# Patient Record
Sex: MALE | Race: WHITE | NOT HISPANIC OR LATINO | URBAN - METROPOLITAN AREA
[De-identification: names, ages, dates, MRNs, and addresses within clinical notes are randomized per-mention and may not be internally consistent; named-entity substitution may affect disease eponyms.]

---

## 2022-11-19 ENCOUNTER — INPATIENT (INPATIENT)
Facility: HOSPITAL | Age: 47
LOS: 9 days | Discharge: HOME | DRG: 231 | End: 2022-11-29
Attending: THORACIC SURGERY (CARDIOTHORACIC VASCULAR SURGERY) | Admitting: THORACIC SURGERY (CARDIOTHORACIC VASCULAR SURGERY)
Payer: MEDICAID

## 2022-11-19 VITALS
HEART RATE: 86 BPM | WEIGHT: 197.98 LBS | DIASTOLIC BLOOD PRESSURE: 103 MMHG | TEMPERATURE: 98 F | RESPIRATION RATE: 18 BRPM | HEIGHT: 67 IN | SYSTOLIC BLOOD PRESSURE: 221 MMHG | OXYGEN SATURATION: 100 %

## 2022-11-19 LAB
ALBUMIN SERPL ELPH-MCNC: 3.7 G/DL — SIGNIFICANT CHANGE UP (ref 3.3–5.2)
ALP SERPL-CCNC: 100 U/L — SIGNIFICANT CHANGE UP (ref 40–120)
ALT FLD-CCNC: 22 U/L — SIGNIFICANT CHANGE UP
ANION GAP SERPL CALC-SCNC: 10 MMOL/L — SIGNIFICANT CHANGE UP (ref 5–17)
APTT BLD: 30.3 SEC — SIGNIFICANT CHANGE UP (ref 27.5–35.5)
AST SERPL-CCNC: 17 U/L — SIGNIFICANT CHANGE UP
BASOPHILS # BLD AUTO: 0.09 K/UL — SIGNIFICANT CHANGE UP (ref 0–0.2)
BASOPHILS NFR BLD AUTO: 0.6 % — SIGNIFICANT CHANGE UP (ref 0–2)
BILIRUB SERPL-MCNC: 0.4 MG/DL — SIGNIFICANT CHANGE UP (ref 0.4–2)
BUN SERPL-MCNC: 18.8 MG/DL — SIGNIFICANT CHANGE UP (ref 8–20)
CALCIUM SERPL-MCNC: 8.6 MG/DL — SIGNIFICANT CHANGE UP (ref 8.4–10.5)
CHLORIDE SERPL-SCNC: 104 MMOL/L — SIGNIFICANT CHANGE UP (ref 96–108)
CO2 SERPL-SCNC: 23 MMOL/L — SIGNIFICANT CHANGE UP (ref 22–29)
CREAT SERPL-MCNC: 0.87 MG/DL — SIGNIFICANT CHANGE UP (ref 0.5–1.3)
EGFR: 108 ML/MIN/1.73M2 — SIGNIFICANT CHANGE UP
EOSINOPHIL # BLD AUTO: 0.22 K/UL — SIGNIFICANT CHANGE UP (ref 0–0.5)
EOSINOPHIL NFR BLD AUTO: 1.5 % — SIGNIFICANT CHANGE UP (ref 0–6)
GLUCOSE SERPL-MCNC: 296 MG/DL — HIGH (ref 70–99)
HCT VFR BLD CALC: 42.3 % — SIGNIFICANT CHANGE UP (ref 39–50)
HGB BLD-MCNC: 15.1 G/DL — SIGNIFICANT CHANGE UP (ref 13–17)
IMM GRANULOCYTES NFR BLD AUTO: 1 % — HIGH (ref 0–0.9)
INR BLD: 1.06 RATIO — SIGNIFICANT CHANGE UP (ref 0.88–1.16)
LIDOCAIN IGE QN: 34 U/L — SIGNIFICANT CHANGE UP (ref 22–51)
LYMPHOCYTES # BLD AUTO: 29.2 % — SIGNIFICANT CHANGE UP (ref 13–44)
LYMPHOCYTES # BLD AUTO: 4.36 K/UL — HIGH (ref 1–3.3)
MAGNESIUM SERPL-MCNC: 1.8 MG/DL — SIGNIFICANT CHANGE UP (ref 1.6–2.6)
MCHC RBC-ENTMCNC: 29.5 PG — SIGNIFICANT CHANGE UP (ref 27–34)
MCHC RBC-ENTMCNC: 35.7 GM/DL — SIGNIFICANT CHANGE UP (ref 32–36)
MCV RBC AUTO: 82.8 FL — SIGNIFICANT CHANGE UP (ref 80–100)
MONOCYTES # BLD AUTO: 0.94 K/UL — HIGH (ref 0–0.9)
MONOCYTES NFR BLD AUTO: 6.3 % — SIGNIFICANT CHANGE UP (ref 2–14)
NEUTROPHILS # BLD AUTO: 9.18 K/UL — HIGH (ref 1.8–7.4)
NEUTROPHILS NFR BLD AUTO: 61.4 % — SIGNIFICANT CHANGE UP (ref 43–77)
NT-PROBNP SERPL-SCNC: 212 PG/ML — SIGNIFICANT CHANGE UP (ref 0–300)
PLATELET # BLD AUTO: 218 K/UL — SIGNIFICANT CHANGE UP (ref 150–400)
POTASSIUM SERPL-MCNC: 4 MMOL/L — SIGNIFICANT CHANGE UP (ref 3.5–5.3)
POTASSIUM SERPL-SCNC: 4 MMOL/L — SIGNIFICANT CHANGE UP (ref 3.5–5.3)
PROT SERPL-MCNC: 6.7 G/DL — SIGNIFICANT CHANGE UP (ref 6.6–8.7)
PROTHROM AB SERPL-ACNC: 12.3 SEC — SIGNIFICANT CHANGE UP (ref 10.5–13.4)
RBC # BLD: 5.11 M/UL — SIGNIFICANT CHANGE UP (ref 4.2–5.8)
RBC # FLD: 12.3 % — SIGNIFICANT CHANGE UP (ref 10.3–14.5)
SODIUM SERPL-SCNC: 137 MMOL/L — SIGNIFICANT CHANGE UP (ref 135–145)
TROPONIN T SERPL-MCNC: <0.01 NG/ML — SIGNIFICANT CHANGE UP (ref 0–0.06)
WBC # BLD: 14.94 K/UL — HIGH (ref 3.8–10.5)
WBC # FLD AUTO: 14.94 K/UL — HIGH (ref 3.8–10.5)

## 2022-11-19 PROCEDURE — 99285 EMERGENCY DEPT VISIT HI MDM: CPT

## 2022-11-19 PROCEDURE — 71045 X-RAY EXAM CHEST 1 VIEW: CPT | Mod: 26

## 2022-11-19 RX ORDER — HYDRALAZINE HCL 50 MG
10 TABLET ORAL ONCE
Refills: 0 | Status: COMPLETED | OUTPATIENT
Start: 2022-11-19 | End: 2022-11-19

## 2022-11-19 RX ORDER — ASPIRIN/CALCIUM CARB/MAGNESIUM 324 MG
324 TABLET ORAL ONCE
Refills: 0 | Status: COMPLETED | OUTPATIENT
Start: 2022-11-19 | End: 2022-11-19

## 2022-11-19 RX ORDER — LISINOPRIL 2.5 MG/1
20 TABLET ORAL ONCE
Refills: 0 | Status: COMPLETED | OUTPATIENT
Start: 2022-11-19 | End: 2022-11-19

## 2022-11-19 RX ORDER — METOPROLOL TARTRATE 50 MG
100 TABLET ORAL ONCE
Refills: 0 | Status: COMPLETED | OUTPATIENT
Start: 2022-11-19 | End: 2022-11-19

## 2022-11-19 RX ORDER — LABETALOL HCL 100 MG
10 TABLET ORAL ONCE
Refills: 0 | Status: COMPLETED | OUTPATIENT
Start: 2022-11-19 | End: 2022-11-19

## 2022-11-19 RX ADMIN — Medication 324 MILLIGRAM(S): at 20:15

## 2022-11-19 RX ADMIN — Medication 10 MILLIGRAM(S): at 20:15

## 2022-11-19 RX ADMIN — Medication 10 MILLIGRAM(S): at 22:56

## 2022-11-19 RX ADMIN — LISINOPRIL 20 MILLIGRAM(S): 2.5 TABLET ORAL at 22:55

## 2022-11-19 RX ADMIN — Medication 100 MILLIGRAM(S): at 22:55

## 2022-11-19 NOTE — ED PROVIDER NOTE - PROGRESS NOTE DETAILS
Dakota: Pt with improved BP, remains asymptomatic. Consult placed to [facility]. Plan for observation.

## 2022-11-19 NOTE — ED PROVIDER NOTE - GENITOURINARY NEGATIVE STATEMENT, MLM
Alert and oriented to person, place, time/situation. normal mood and affect. no apparent risk to self or others. no dysuria, no frequency, and no hematuria.

## 2022-11-19 NOTE — ED PROVIDER NOTE - ENMT, MLM
Dr Lemus
Airway patent, Nasal mucosa clear. Mouth with normal mucosa. Throat has no vesicles, no oropharyngeal exudates and uvula is midline.

## 2022-11-19 NOTE — ED PROVIDER NOTE - OBJECTIVE STATEMENT
A 46 yo M with pmh of HTN, DM, presents c/o chest pain while walking, started at 3pm, located in the middle chest, no radiation, lasted for 3 min. Denies sob, HA, abd pain n/v/d. At 5 pm, Pt had CP again with tingling sensation in R arm, lasted for 10 min. At 7:15 pm, had again and then came to the ED. States he is currently hypertensive but asymptomatic. Reports that he forgot taking medication today. Denies taking blood thinner.

## 2022-11-19 NOTE — ED ADULT NURSE NOTE - OBJECTIVE STATEMENT
AOx4 c/o chest pain, non radiating.  pt states that he has PMH hypertension, home meds lisinopril, metoprolol.  hypertensive in ED, medications ordered.  pt placed on CM, NSR.  denies HA, SOB, sick contacts, NVD.  Afebrile.

## 2022-11-19 NOTE — ED PROVIDER NOTE - CLINICAL SUMMARY MEDICAL DECISION MAKING FREE TEXT BOX
A 48 yo M with pmh of HTN, DM, presents c/o chest pain while walking, started at 3pm, located in the middle chest, no radiation, lasted for 3 min. Denies sob, HA, abd pain n/v/d. At 5 pm, Pt had CP again with tingling sensation in R arm, lasted for 10 min. At 7:15 pm, had again and then came to the ED. States he is currently hypertensive but asymptomatic. Reports that he forgot taking medication today. Denies taking blood thinner.  Check cbc, cmp, coag, trop x2, ekg, cxr, swab. Cards consult. IV Labetalol and aspirin 325 given.

## 2022-11-19 NOTE — ED ADULT TRIAGE NOTE - CHIEF COMPLAINT QUOTE
Ambulatory reporting three episodes of burning chest pain today. The first happened after eating his first meal today @ approx 1500 with two subsequent episodes after that. Denies N/V/D, sick contacts. EKG completed prior to triage. Patient takes 2 anti-hypertensives prescribed to him in [place] but does not follow up with cardiologist here. Hypertensive in triage.

## 2022-11-19 NOTE — ED PROVIDER NOTE - ATTENDING CONTRIBUTION TO CARE
Dakota SOTO: I performed a face to face evaluation of this patient and performed a full history and physical examination on the patient.  I agree with the resident's history, physical examination, and plan of the patient unless otherwise noted. My brief assessment is as follows:    47y M w/ hx HTN, DM, presents with chest pain. Pt reports that he was at work today at 3 PM when symptoms began. 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 Complains of burning/pressure discomfort to the substernal area, sometimes radiating to his hands b/l. Symptoms lasted a few minutes and then subsided. No diaphoresis, nausea, vomiting, SOB, palpitations. States that symptoms recurred two other times prior to arrival. Currently asymptomatic. Denies prior hx of similar episodes. Pt with elevated BP on arrival; states that he forgot to take his medications today. Has never seen a cardiologist. No known family hx of CAD. Smokes 1 PPD. On exam, pt well appearing and in no distress, heart RRR, no murmur. Lungs CTAB, abdomen soft/NT/ND. Nonspecific ST-T changes on EKG. Will check CXR, labs. Give ASA, labetalol. Consult cardiology.

## 2022-11-19 NOTE — ED ADULT NURSE NOTE - NEURO BEHAVIOR
Spoke to mom to notify her that labs are in and she can get them done at any time but pt must be fasting. Mom verbalized understanding.    calm

## 2022-11-20 DIAGNOSIS — R07.9 CHEST PAIN, UNSPECIFIED: ICD-10-CM

## 2022-11-20 LAB
A1C WITH ESTIMATED AVERAGE GLUCOSE RESULT: 11.8 % — HIGH (ref 4–5.6)
ESTIMATED AVERAGE GLUCOSE: 292 MG/DL — HIGH (ref 68–114)
GLUCOSE BLDC GLUCOMTR-MCNC: 213 MG/DL — HIGH (ref 70–99)
GLUCOSE BLDC GLUCOMTR-MCNC: 217 MG/DL — HIGH (ref 70–99)
GLUCOSE BLDC GLUCOMTR-MCNC: 246 MG/DL — HIGH (ref 70–99)
SARS-COV-2 RNA SPEC QL NAA+PROBE: SIGNIFICANT CHANGE UP
TROPONIN T SERPL-MCNC: <0.01 NG/ML — SIGNIFICANT CHANGE UP (ref 0–0.06)

## 2022-11-20 PROCEDURE — 99218: CPT

## 2022-11-20 RX ORDER — INSULIN LISPRO 100/ML
VIAL (ML) SUBCUTANEOUS
Refills: 0 | Status: DISCONTINUED | OUTPATIENT
Start: 2022-11-20 | End: 2022-11-23

## 2022-11-20 RX ORDER — SODIUM CHLORIDE 9 MG/ML
1000 INJECTION, SOLUTION INTRAVENOUS
Refills: 0 | Status: DISCONTINUED | OUTPATIENT
Start: 2022-11-20 | End: 2022-11-23

## 2022-11-20 RX ORDER — DEXTROSE 50 % IN WATER 50 %
25 SYRINGE (ML) INTRAVENOUS ONCE
Refills: 0 | Status: DISCONTINUED | OUTPATIENT
Start: 2022-11-20 | End: 2022-11-23

## 2022-11-20 RX ORDER — DEXTROSE 50 % IN WATER 50 %
12.5 SYRINGE (ML) INTRAVENOUS ONCE
Refills: 0 | Status: DISCONTINUED | OUTPATIENT
Start: 2022-11-20 | End: 2022-11-23

## 2022-11-20 RX ORDER — GLUCAGON INJECTION, SOLUTION 0.5 MG/.1ML
1 INJECTION, SOLUTION SUBCUTANEOUS ONCE
Refills: 0 | Status: DISCONTINUED | OUTPATIENT
Start: 2022-11-20 | End: 2022-11-23

## 2022-11-20 RX ORDER — LISINOPRIL 2.5 MG/1
20 TABLET ORAL
Refills: 0 | Status: DISCONTINUED | OUTPATIENT
Start: 2022-11-20 | End: 2022-11-23

## 2022-11-20 RX ORDER — DEXTROSE 50 % IN WATER 50 %
15 SYRINGE (ML) INTRAVENOUS ONCE
Refills: 0 | Status: DISCONTINUED | OUTPATIENT
Start: 2022-11-20 | End: 2022-11-23

## 2022-11-20 RX ORDER — METOPROLOL TARTRATE 50 MG
100 TABLET ORAL
Refills: 0 | Status: DISCONTINUED | OUTPATIENT
Start: 2022-11-20 | End: 2022-11-23

## 2022-11-20 RX ADMIN — Medication 2: at 11:52

## 2022-11-20 RX ADMIN — LISINOPRIL 20 MILLIGRAM(S): 2.5 TABLET ORAL at 21:29

## 2022-11-20 RX ADMIN — Medication 2: at 17:02

## 2022-11-20 RX ADMIN — Medication 2: at 08:04

## 2022-11-20 RX ADMIN — Medication 100 MILLIGRAM(S): at 21:29

## 2022-11-20 NOTE — ED CDU PROVIDER INITIAL DAY NOTE - MEDICAL DECISION MAKING DETAILS
Chest pain:   -To be seen by [facility] cardiology   -trop neg x 1. Repeat pending.   -Nonspecific ST-T changes on EKG  -Smokes 1ppd   -Given asa in ED   -CXR    HTN/DM:   -ISS  -home meds

## 2022-11-20 NOTE — CONSULT NOTE ADULT - ASSESSMENT
This is 46 y/o male with hx of HTN, DM2, current smoker who presents with chest pain. Pain is midsternal, nonradiating but associated with some b/l hand discomfort. 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 Pt received  mg in ED. He was found hypertensive 221/103 and was treated with Labetalol and Hydralazine IV. Troponin negative x1, proBNP normal. EKG shows SR with nonspecific T-wave abnormality. Pt does not have a cardiologist and denies any prior cardiac issues.

## 2022-11-20 NOTE — CONSULT NOTE ADULT - SUBJECTIVE AND OBJECTIVE BOX
[facility]                                              CARDIOLOGY AT [place] [address]                                             Telephone: [phone]. Fax:[phone]                                                       CARDIOLOGY CONSULTATION NOTE                                                                                             History obtained by: Patient and medical record  Community Cardiologist: n/a   obtained: Yes [  ] No [ x ]  Reason for Consultation: chest pain  Available out pt records reviewed: Yes [  ] No [ x ]    Chief complaint:  "I had pain in my chest"    HPI:  This is 46 y/o male with hx of HTN, DM2, current smoker who presents with chest pain. Pain is midsternal, nonradiating but associated with some b/l hand discomfort. 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 Pt received  mg in ED. He was found hypertensive 221/103 and was treated with Labetalol and Hydralazine IV. Troponin negative x1, proBNP normal. EKG shows SR with nonspecific T-wave abnormality. Pt does not have a cardiologist and denies any prior cardiac issues.    CARDIAC TESTING   ECHO: n/a    STRESS: n/a    CATH: n/a    ELECTROPHYSIOLOGY: n/a    PAST MEDICAL HISTORY  HTN (hypertension)    DM (diabetes mellitus)  smoker        PAST SURGICAL HISTORY      SOCIAL HISTORY:  lives with family, installs doors  CIGARETTES:   smokes 1 ppd for 30 years  ALCOHOL: denies  DRUGS: denies    FAMILY HISTORY:    Family History of Cardiovascular Disease:  Yes [  ] No [  ]  Coronary Artery Disease in first degree relative: Yes [  ] No [  ]  Sudden Cardiac Death in First degree relative: Yes [  ] No [  ]    HOME MEDICATIONS:  lisinopril 20 mg oral tablet: 1 tab(s) orally 2 times a day (20 Nov 2022 00:32)  metFORMIN 1000 mg oral tablet: 1 tab(s) orally 2 times a day (20 Nov 2022 00:32)  Metoprolol Tartrate 100 mg oral tablet: 1 tab(s) orally 2 times a day (20 Nov 2022 00:32)      CURRENT CARDIAC MEDICATIONS:  lisinopril 20 milliGRAM(s) Oral two times a day  metoprolol tartrate 100 milliGRAM(s) Oral two times a day      CURRENT OTHER MEDICATIONS:  dextrose 5%. 1000 milliLiter(s) (50 mL/Hr) IV Continuous <Continuous>  dextrose 5%. 1000 milliLiter(s) (100 mL/Hr) IV Continuous <Continuous>  dextrose 50% Injectable 25 Gram(s) IV Push once, Stop order after: 1 Doses  dextrose 50% Injectable 12.5 Gram(s) IV Push once, Stop order after: 1 Doses  dextrose 50% Injectable 25 Gram(s) IV Push once, Stop order after: 1 Doses  dextrose Oral Gel 15 Gram(s) Oral once, Stop order after: 1 Doses PRN Blood Glucose LESS THAN 70 milliGRAM(s)/deciliter  glucagon  Injectable 1 milliGRAM(s) IntraMuscular once, Stop order after: 1 Doses  insulin lispro (ADMELOG) corrective regimen sliding scale   SubCutaneous three times a day before meals      ALLERGIES:   No Known Allergies      REVIEW OF SYMPTOMS:   CONSTITUTIONAL: No fever, no chills, no weight loss, no weight gain, no fatigue   ENMT:  No vertigo; No sinus or throat pain  NECK: No pain or stiffness  CARDIOVASCULAR: as per HPI  RESPIRATORY: no Shortness of breath, no cough, no wheezing  : No dysuria, no hematuria   GI: No dark color stool, no nausea, no diarrhea, no constipation, no abdominal pain   NEURO: No headache, no slurred speech   MUSCULOSKELETAL: No joint pain or swelling; No muscle, back, or extremity pain  PSYCH: No agitation, no anxiety.    ALL OTHER REVIEW OF SYSTEMS ARE NEGATIVE.    VITAL SIGNS:  T(C): 36.6 (11-19-22 @ 23:25), Max: 36.7 (11-19-22 @ 19:32)  T(F): 97.9 (11-19-22 @ 23:25), Max: 98.1 (11-19-22 @ 19:32)  HR: 64 (11-19-22 @ 23:25) (64 - 86)  BP: 132/58 (11-19-22 @ 23:25) (132/58 - 221/103)  RR: 20 (11-19-22 @ 23:25) (18 - 20)  SpO2: 98% (11-19-22 @ 23:25) (98% - 100%)    INTAKE AND OUTPUT:       PHYSICAL EXAM:  Constitutional: Comfortable . No acute distress.   HEENT: Atraumatic and normocephalic , neck is supple . no JVD. No carotid bruit.  CNS: A&Ox3. No focal deficits.   Respiratory: CTAB, unlabored   Cardiovascular: RRR normal s1 s2. No murmurs  Gastrointestinal: Soft, non-tender. +Bowel sounds.   Extremities: 2+ Peripheral Pulses, No clubbing, cyanosis, or edema  Psychiatric: Calm . no agitation.   Skin: Warm and dry, no ulcers on extremities     LABS:  ( 19 Nov 2022 21:00 )  Troponin T  <0.01,  CPK  X    , CKMB  X    ,                                 15.1   14.94 )-----------( 218      ( 19 Nov 2022 21:00 )             42.3     11-19    137  |  104  |  18.8  ----------------------------<  296<H>  4.0   |  23.0  |  0.87    Ca    8.6      19 Nov 2022 21:00  Mg     1.8     11-19    TPro  6.7  /  Alb  3.7  /  TBili  0.4  /  DBili  x   /  AST  17  /  ALT  22  /  AlkPhos  100  11-19    PT/INR - ( 19 Nov 2022 21:00 )   PT: 12.3 sec;   INR: 1.06 ratio         PTT - ( 19 Nov 2022 21:00 )  PTT:30.3 sec      INTERPRETATION OF TELEMETRY: SR 60's, no events    ECG: SR 63 bpm, nonspecific T-wave abnormality  Prior ECG: Yes [  ] No [ x ]    RADIOLOGY & ADDITIONAL STUDIES: n/a   X-ray:    CT scan:   MRI:   US:

## 2022-11-20 NOTE — ED ADULT NURSE REASSESSMENT NOTE - COMFORT CARE
meal provided/plan of care explained/po fluids offered/wait time explained/warm blanket provided
meal provided/plan of care explained/po fluids offered/repositioned/warm blanket provided

## 2022-11-20 NOTE — ED CDU PROVIDER INITIAL DAY NOTE - ATTENDING APP SHARED VISIT CONTRIBUTION OF CARE
I, Natividad Clements, performed a history and physical exam of the patient and discussed their management with the resident and /or advanced care provider. I reviewed the resident and /or ACP's note and agree with the documented findings and plan of care except where otherwise noted in my note. I was present and available for all procedures.     Patient initially presented with chest pain with exertion, improved with rest,  two episodes with some tingling in R arm. No associated sob, nausea or diaphoresis. Initially hypertensive upon arrival but did not take home meds before coming to ER and BP has now improved. . EKG nonischemic, troponin negative, TTE noted, seen by  cardiology. Patient to get Cardiac CT. Chest pain now resolved.     PHYSICAL EXAM:   General: well-appearing, appears stated age, not in extremis   HEENT: NC/AT, airway patent  Cardiovascular: regular rate and rhythm, + S1/S2, no murmurs, rubs, gallops appreciated  Respiratory: clear to auscultation bilaterally, good aeration bilaterally, nonlabored respirations  Extremities: no LE edema or calf tenderness b/l. Radial pulses equal and strong b/l  Neuro: Alert and oriented x3. Moving all extremities.   Psychiatric: appropriate mood and affect.    -Natividad Clements MD Attending Physician pending [facility] cards .  This was a shared visit with SAYDA. I reviewed and verified the documentation and independently performed the documented history/exam/mdm.

## 2022-11-20 NOTE — CONSULT NOTE ADULT - NS ATTEND AMEND GEN_ALL_CORE FT
Patient was seen and examined at bedside and notes to be doing well now but notes that yesterday had episode of shortness of breath with mid sternal chest pain, pressure like and intermittent in nature, occuring approx 4 times   Patient has a hx of HTN, DM2 and smoker and thus have risk factors for CAD     EKG reviewed NSR @ 62 bpm   Trops negative x 2   TTE:  1. Left ventricular ejection fraction, by visual estimation, is 60 to  65%.  2. Normal global left ventricular systolic function.  3. Normal right ventricular size and function.  4. No significant valve disease.  5. There is no evidence of pericardial effusion.    48 y/o male with hx of HTN, DM2, current smoker who presents with chest pain. Pain is midsternal, non radiating but associated with some b/l hand discomfort,  with angina   - patient notes relief of chest pain at this time and EKG reviewed shows normal sinus rhythm and trops negative x2   - TTE normal LVEF   - based on risk factors for CAD - recommend Cardiac CTA to assess for any obstructive disease and any evidenc eof cardiac anomaly   - continue with Lisinopril and Metoprolol     discussed plan with ER team and patient (patient is apprehensive waiting for testing )  Amy Brantley D.O. [facility]  Cardiology/Vascular Cardiology -[facility]   Telephone # [phone]

## 2022-11-20 NOTE — ED CDU PROVIDER INITIAL DAY NOTE - OBJECTIVE STATEMENT
47M with HTN and DM presenting with chest pain while walking, started at 3pm, located in the middle chest, no radiation, lasted for 3 min. Denies sob, HA, abd pain n/v/d. At 5 pm, Pt had CP again with tingling sensation in R arm, lasted for 10 min. In ED pt is hypertensive and did not take his meds today. Meds given and BP improved. To be seen by Cardiology.

## 2022-11-20 NOTE — ED CDU PROVIDER INITIAL DAY NOTE - NS ED ATTENDING STATEMENT MOD
This was a shared visit with the SAYDA. I reviewed and verified the documentation and independently performed the documented:

## 2022-11-21 DIAGNOSIS — R07.9 CHEST PAIN, UNSPECIFIED: ICD-10-CM

## 2022-11-21 LAB
ALBUMIN SERPL ELPH-MCNC: 3.7 G/DL — SIGNIFICANT CHANGE UP (ref 3.3–5.2)
ALP SERPL-CCNC: 96 U/L — SIGNIFICANT CHANGE UP (ref 40–120)
ALT FLD-CCNC: 20 U/L — SIGNIFICANT CHANGE UP
ANION GAP SERPL CALC-SCNC: 11 MMOL/L — SIGNIFICANT CHANGE UP (ref 5–17)
APPEARANCE UR: CLEAR — SIGNIFICANT CHANGE UP
AST SERPL-CCNC: 15 U/L — SIGNIFICANT CHANGE UP
BACTERIA # UR AUTO: ABNORMAL
BASOPHILS # BLD AUTO: 0.07 K/UL — SIGNIFICANT CHANGE UP (ref 0–0.2)
BASOPHILS NFR BLD AUTO: 0.5 % — SIGNIFICANT CHANGE UP (ref 0–2)
BILIRUB SERPL-MCNC: 0.5 MG/DL — SIGNIFICANT CHANGE UP (ref 0.4–2)
BILIRUB UR-MCNC: NEGATIVE — SIGNIFICANT CHANGE UP
BUN SERPL-MCNC: 19.5 MG/DL — SIGNIFICANT CHANGE UP (ref 8–20)
CALCIUM SERPL-MCNC: 8.7 MG/DL — SIGNIFICANT CHANGE UP (ref 8.4–10.5)
CHLORIDE SERPL-SCNC: 103 MMOL/L — SIGNIFICANT CHANGE UP (ref 96–108)
CO2 SERPL-SCNC: 22 MMOL/L — SIGNIFICANT CHANGE UP (ref 22–29)
COLOR SPEC: YELLOW — SIGNIFICANT CHANGE UP
CREAT SERPL-MCNC: 0.72 MG/DL — SIGNIFICANT CHANGE UP (ref 0.5–1.3)
DIFF PNL FLD: ABNORMAL
EGFR: 114 ML/MIN/1.73M2 — SIGNIFICANT CHANGE UP
EOSINOPHIL # BLD AUTO: 0.15 K/UL — SIGNIFICANT CHANGE UP (ref 0–0.5)
EOSINOPHIL NFR BLD AUTO: 1.2 % — SIGNIFICANT CHANGE UP (ref 0–6)
EPI CELLS # UR: SIGNIFICANT CHANGE UP
GLUCOSE BLDC GLUCOMTR-MCNC: 149 MG/DL — HIGH (ref 70–99)
GLUCOSE BLDC GLUCOMTR-MCNC: 163 MG/DL — HIGH (ref 70–99)
GLUCOSE BLDC GLUCOMTR-MCNC: 172 MG/DL — HIGH (ref 70–99)
GLUCOSE BLDC GLUCOMTR-MCNC: 197 MG/DL — HIGH (ref 70–99)
GLUCOSE BLDC GLUCOMTR-MCNC: 344 MG/DL — HIGH (ref 70–99)
GLUCOSE SERPL-MCNC: 316 MG/DL — HIGH (ref 70–99)
GLUCOSE UR QL: 250 MG/DL
HCT VFR BLD CALC: 46.3 % — SIGNIFICANT CHANGE UP (ref 39–50)
HGB BLD-MCNC: 15.8 G/DL — SIGNIFICANT CHANGE UP (ref 13–17)
IMM GRANULOCYTES NFR BLD AUTO: 0.9 % — SIGNIFICANT CHANGE UP (ref 0–0.9)
KETONES UR-MCNC: NEGATIVE — SIGNIFICANT CHANGE UP
LEUKOCYTE ESTERASE UR-ACNC: NEGATIVE — SIGNIFICANT CHANGE UP
LYMPHOCYTES # BLD AUTO: 26.4 % — SIGNIFICANT CHANGE UP (ref 13–44)
LYMPHOCYTES # BLD AUTO: 3.41 K/UL — HIGH (ref 1–3.3)
MCHC RBC-ENTMCNC: 28.7 PG — SIGNIFICANT CHANGE UP (ref 27–34)
MCHC RBC-ENTMCNC: 34.1 GM/DL — SIGNIFICANT CHANGE UP (ref 32–36)
MCV RBC AUTO: 84.2 FL — SIGNIFICANT CHANGE UP (ref 80–100)
MONOCYTES # BLD AUTO: 0.93 K/UL — HIGH (ref 0–0.9)
MONOCYTES NFR BLD AUTO: 7.2 % — SIGNIFICANT CHANGE UP (ref 2–14)
NEUTROPHILS # BLD AUTO: 8.25 K/UL — HIGH (ref 1.8–7.4)
NEUTROPHILS NFR BLD AUTO: 63.8 % — SIGNIFICANT CHANGE UP (ref 43–77)
NITRITE UR-MCNC: NEGATIVE — SIGNIFICANT CHANGE UP
PH UR: 6.5 — SIGNIFICANT CHANGE UP (ref 5–8)
PLATELET # BLD AUTO: 216 K/UL — SIGNIFICANT CHANGE UP (ref 150–400)
POTASSIUM SERPL-MCNC: 4.3 MMOL/L — SIGNIFICANT CHANGE UP (ref 3.5–5.3)
POTASSIUM SERPL-SCNC: 4.3 MMOL/L — SIGNIFICANT CHANGE UP (ref 3.5–5.3)
PROT SERPL-MCNC: 7.1 G/DL — SIGNIFICANT CHANGE UP (ref 6.6–8.7)
PROT UR-MCNC: 30 MG/DL
RBC # BLD: 5.5 M/UL — SIGNIFICANT CHANGE UP (ref 4.2–5.8)
RBC # FLD: 12.4 % — SIGNIFICANT CHANGE UP (ref 10.3–14.5)
RBC CASTS # UR COMP ASSIST: SIGNIFICANT CHANGE UP /HPF (ref 0–4)
SODIUM SERPL-SCNC: 136 MMOL/L — SIGNIFICANT CHANGE UP (ref 135–145)
SP GR SPEC: 1.01 — SIGNIFICANT CHANGE UP (ref 1.01–1.02)
TROPONIN T SERPL-MCNC: 0.01 NG/ML — SIGNIFICANT CHANGE UP (ref 0–0.06)
UROBILINOGEN FLD QL: NEGATIVE MG/DL — SIGNIFICANT CHANGE UP
WBC # BLD: 12.93 K/UL — HIGH (ref 3.8–10.5)
WBC # FLD AUTO: 12.93 K/UL — HIGH (ref 3.8–10.5)
WBC UR QL: SIGNIFICANT CHANGE UP /HPF (ref 0–5)

## 2022-11-21 PROCEDURE — 75574 CT ANGIO HRT W/3D IMAGE: CPT | Mod: 26,MA

## 2022-11-21 PROCEDURE — 99232 SBSQ HOSP IP/OBS MODERATE 35: CPT | Mod: 25

## 2022-11-21 PROCEDURE — 99217: CPT

## 2022-11-21 PROCEDURE — 99223 1ST HOSP IP/OBS HIGH 75: CPT

## 2022-11-21 RX ORDER — LANOLIN ALCOHOL/MO/W.PET/CERES
3 CREAM (GRAM) TOPICAL AT BEDTIME
Refills: 0 | Status: DISCONTINUED | OUTPATIENT
Start: 2022-11-21 | End: 2022-11-23

## 2022-11-21 RX ORDER — CLOPIDOGREL BISULFATE 75 MG/1
300 TABLET, FILM COATED ORAL ONCE
Refills: 0 | Status: COMPLETED | OUTPATIENT
Start: 2022-11-21 | End: 2022-11-21

## 2022-11-21 RX ORDER — ASPIRIN/CALCIUM CARB/MAGNESIUM 324 MG
81 TABLET ORAL DAILY
Refills: 0 | Status: DISCONTINUED | OUTPATIENT
Start: 2022-11-21 | End: 2022-11-23

## 2022-11-21 RX ORDER — CLOPIDOGREL BISULFATE 75 MG/1
75 TABLET, FILM COATED ORAL DAILY
Refills: 0 | Status: DISCONTINUED | OUTPATIENT
Start: 2022-11-21 | End: 2022-11-23

## 2022-11-21 RX ORDER — ATORVASTATIN CALCIUM 80 MG/1
40 TABLET, FILM COATED ORAL AT BEDTIME
Refills: 0 | Status: DISCONTINUED | OUTPATIENT
Start: 2022-11-21 | End: 2022-11-22

## 2022-11-21 RX ORDER — ACETAMINOPHEN 500 MG
650 TABLET ORAL EVERY 6 HOURS
Refills: 0 | Status: DISCONTINUED | OUTPATIENT
Start: 2022-11-21 | End: 2022-11-23

## 2022-11-21 RX ORDER — ASPIRIN/CALCIUM CARB/MAGNESIUM 324 MG
325 TABLET ORAL ONCE
Refills: 0 | Status: COMPLETED | OUTPATIENT
Start: 2022-11-21 | End: 2022-11-21

## 2022-11-21 RX ORDER — ONDANSETRON 8 MG/1
4 TABLET, FILM COATED ORAL EVERY 8 HOURS
Refills: 0 | Status: DISCONTINUED | OUTPATIENT
Start: 2022-11-21 | End: 2022-11-23

## 2022-11-21 RX ADMIN — LISINOPRIL 20 MILLIGRAM(S): 2.5 TABLET ORAL at 07:30

## 2022-11-21 RX ADMIN — ATORVASTATIN CALCIUM 40 MILLIGRAM(S): 80 TABLET, FILM COATED ORAL at 22:12

## 2022-11-21 RX ADMIN — CLOPIDOGREL BISULFATE 300 MILLIGRAM(S): 75 TABLET, FILM COATED ORAL at 17:47

## 2022-11-21 RX ADMIN — Medication 4: at 07:48

## 2022-11-21 RX ADMIN — Medication 325 MILLIGRAM(S): at 17:47

## 2022-11-21 RX ADMIN — Medication 1: at 12:30

## 2022-11-21 RX ADMIN — LISINOPRIL 20 MILLIGRAM(S): 2.5 TABLET ORAL at 17:48

## 2022-11-21 RX ADMIN — Medication 100 MILLIGRAM(S): at 05:31

## 2022-11-21 NOTE — PROGRESS NOTE ADULT - SUBJECTIVE AND OBJECTIVE BOX
[facility]                                                         CARDIOLOGY AT [facility]                                                                  [address], [place] [address]                                                         Telephone: [phone]. Fax:[phone]                                                                             PROGRESS NOTE    Reason for follow up:   Update:   pending CCTa results.    Review of symptoms:   Cardiac:  No chest pain. No dyspnea. No palpitations.  Respiratory: no cough. No dyspnea  Gastrointestinal: No diarrhea. No abdominal pain. No bleeding.   Neuro: No focal neuro complaints.    Vitals:  T(C): 36.7 (11-21-22 @ 11:04), Max: 36.7 (11-20-22 @ 16:12)  HR: 52 (11-21-22 @ 11:04) (50 - 60)  BP: 138/86 (11-21-22 @ 11:04) (129/83 - 161/90)  RR: 20 (11-21-22 @ 11:04) (15 - 20)  SpO2: 95% (11-21-22 @ 11:04) (95% - 100%)  I&O's Summary  Weight (kg): 89.8 (11-19 @ 19:32)    PHYSICAL EXAM:  Appearance: Comfortable. No acute distress  HEENT:  Atraumatic. Normocephalic.  Normal oral mucosa  Neurologic: A & O x 3, no gross focal deficits.  Cardiovascular: RRR S1 S2, No murmur, no rubs/gallops. No JVD  Respiratory: Lungs clear to auscultation, unlabored   Gastrointestinal:  Soft, Non-tender, + BS  Lower Extremities: 2+ Peripheral Pulses, No clubbing, cyanosis, or edema  Psychiatry: Patient is calm. No agitation.   Skin: warm and dry.    CURRENT CARDIAC MEDICATIONS:  lisinopril 20 milliGRAM(s) Oral two times a day  metoprolol tartrate 100 milliGRAM(s) Oral two times a day    CURRENT OTHER MEDICATIONS:  insulin lispro (ADMELOG) corrective regimen sliding scale   SubCutaneous three times a day before meals  dextrose 5%. 1000 milliLiter(s) (50 mL/Hr) IV Continuous <Continuous>  dextrose 5%. 1000 milliLiter(s) (100 mL/Hr) IV Continuous <Continuous>      LABS:	 	  ( 21 Nov 2022 08:04 )  Troponin T  0.01 ,  CPK  X    , CKMB  X    , BNP X        , ( 20 Nov 2022 04:07 )  Troponin T  <0.01,  CPK  X    , CKMB  X    , BNP X        , ( 19 Nov 2022 21:00 )  Troponin T  <0.01,  CPK  X    , CKMB  X    ,                             15.8   12.93 )-----------( 216      ( 21 Nov 2022 08:04 )             46.3     11-21    136  |  103  |  19.5  ----------------------------<  316<H>  4.3   |  22.0  |  0.72    Ca    8.7      21 Nov 2022 08:04  Mg     1.8     11-19    TPro  7.1  /  Alb  3.7  /  TBili  0.5  /  DBili  x   /  AST  15  /  ALT  20  /  AlkPhos  96  11-21    PT/INR/PTT ( 19 Nov 2022 21:00 )              12.3         :       30.3                  .        .                   .              .           .       1.06        .                                         TELEMETRY:   Sr, no acute alarms noted.

## 2022-11-21 NOTE — ED CDU PROVIDER DISPOSITION NOTE - CLINICAL COURSE
48 y/o male with hx of HTN, DM2, current smoker who presents with chest pain. Pain is midsternal, nonradiating but associated with some b/l hand discomfort. 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 Pt received  mg in ED. He was found hypertensive 221/103 and was treated with Labetalol and Hydralazine IV. Placed in observation with trops neg x 3, seen by [facility] cards had echo and cardiac CT done notable for plaque in LAD calcium score 30. cards recommending admission for cardiac cath

## 2022-11-21 NOTE — PROGRESS NOTE ADULT - PROBLEM SELECTOR PLAN 1
on: -telemetry monitoring  -cardiac markers x3 negative  -BP improved after receiving missed home antihypertensives and IV Labetalol  -TTE Ef , no valve disease, and normal function and no effusions noted.    -CCTA with high grade stenosis in pRamus, moderate narrow of pLCX and plaque in LAD moderate noted, calcium socre of 30.  Will discuss with MD for further plan of care  Patient is a diabetic and AIC 11.8  smoking cessation.  continue with Lisinopril and Metoprolol   Further plan will discuss with cardiologist. on: -telemetry monitoring  -cardiac markers x3 negative  -BP improved after receiving missed home antihypertensives and IV Labetalol  -TTE Ef , no valve disease, and normal function and no effusions noted.    -CCTA with high grade stenosis in pRamus, moderate narrow of pLCX and plaque in LAD moderate noted, calcium socre of 30.  Will discuss with MD for further plan of care  Patient is a diabetic and AIC 11.8  smoking cessation.  continue with Lisinopril and Metoprolol   LHC in am  NPO after midnight on: -telemetry monitoring  -cardiac markers x3 negative  -BP improved after receiving missed home antihypertensives and IV Labetalol  -TTE Ef , no valve disease, and normal function and no effusions noted.    -CCTA with high grade stenosis in pRamus, moderate narrow of pLCX and plaque in LAD moderate noted, calcium socre of 30.    Patient is a diabetic and AIC 11.8  smoking cessation.  continue with Lisinopril and Metoprolol   LHC in am  NPO after midnight  Load with Plavix 300mg po tonight and aspirin  Ct daily aspirin 81mg po daily and Plavix 75mg po daily

## 2022-11-21 NOTE — PATIENT PROFILE ADULT - FALL HARM RISK - UNIVERSAL INTERVENTIONS
Bed in lowest position, wheels locked, appropriate side rails in place/Call bell, personal items and telephone in reach/Instruct patient to call for assistance before getting out of bed or chair/[place] to call system/Physically safe environment - no spills, clutter or unnecessary equipment/Purposeful Proactive Rounding/Room/bathroom lighting operational, light cord in reach

## 2022-11-21 NOTE — ED CDU PROVIDER SUBSEQUENT DAY NOTE - ATTENDING APP SHARED VISIT CONTRIBUTION OF CARE
I, Natividad Clements, performed a history and physical exam of the patient and discussed their management with the resident and /or advanced care provider. I reviewed the resident and /or ACP's note and agree with the documented findings and plan of care except where otherwise noted in my note. I was present and available for all procedures.     Patient initially presented with chest pain with exertion, improved with rest,  two episodes with some tingling in R arm. No associated sob, nausea or diaphoresis. Initially hypertensive upon arrival but did not take home meds before coming to ER and BP has now improved. . EKG nonischemic, troponin negative, TTE noted, seen by  cardiology. Patient to get Cardiac CT. Chest pain now resolved.     PHYSICAL EXAM:   General: well-appearing, appears stated age, not in extremis   HEENT: NC/AT, airway patent  Cardiovascular: regular rate and rhythm, + S1/S2, no murmurs, rubs, gallops appreciated  Respiratory: clear to auscultation bilaterally, good aeration bilaterally, nonlabored respirations  Extremities: no LE edema or calf tenderness b/l. Radial pulses equal and strong b/l  Neuro: Alert and oriented x3. Moving all extremities.   Psychiatric: appropriate mood and affect.    -Natividad Clements MD Attending Physician

## 2022-11-21 NOTE — PATIENT PROFILE ADULT - MEDICATIONS/VISITS
----- Message from [facility],  Wero Lozano sent at 8/12/2021  8:30 AM EDT -----   Please call the patient and make him aware that he does have right eye diabetic retinopathy and needs to see a retinal specialist within 2 weeks    Please provide him the number for [facility]  or [address] no

## 2022-11-21 NOTE — ED CDU PROVIDER DISPOSITION NOTE - SECONDARY DIAGNOSIS.
Called to remind patient to send carelink for pacemaker device. Patient voiced understanding. Hypertension

## 2022-11-21 NOTE — PROGRESS NOTE ADULT - ASSESSMENT
46 y/o male with hx of HTN, DM2, current smoker who presents with chest pain. Pain is midsternal, nonradiating but associated with some b/l hand discomfort. 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 Pt received  mg in ED. He was found hypertensive 221/103 and was treated with Labetalol and Hydralazine IV. Troponin negative x1, proBNP normal. EKG shows SR with nonspecific T-wave abnormality. Pt does not have a cardiologist and denies any prior cardiac issues.

## 2022-11-21 NOTE — ED ADULT NURSE REASSESSMENT NOTE - NS ED NURSE REASSESS COMMENT FT1
Cardiology at bedside for pt evaluation
Pt resting comfortably on stretcher.  No complaints of pain.  Respirations even and unlabored.  Awaiting further POC.  SB on CM.   PIV wnl; flushing without difficulty.  In NAD, will continue to monitor.
Report received from Neema ALVARENGA.  Pt resting comfortably on stretcher.  No complaints of pain.  Respirations even and unlabored.  Awaiting cardiac CTA.  PIV wnl; flushing without difficulty.  In NAD, will continue to monitor.
assumed care of pt from RN CA, pt AAOX4, resp. even and unlabored on RA, pt on CM, pt came in for chest pain and HTN, pt denies chest pain at this time, denies SOB/, pt resting comfortably in bed and call bell within reach
cardiology at bedside to update on POC
pt received from day LYLE CHOI  pt resting comfortably in stretcher.  denies any complaints.  NAD at this time.
pt resting comfortably in bed, AAOX4, resp. even and unlabored on RA, pt denies chest pain/SOB, pt on CM, repeat trop drawn
pt resting comfortably in stretcher.  RR unlabored. maintained on CM.
telemetry called informing that pt had run of non sustained AIVR, MD Duncan made aware.
Pt awake and alert x4, VSS afebrile. Pt resting comfortably at this time. Pt awaiting CCTA on 11/21. Safety maintained. Will continue to monitor
Assumed care of the patient @0730. Pt A&Ox4.VSS afebrile. Sinus jinny on cardiac monitor, denies c/o chest pain at this time. Pt awaiting ECHO and cardio consult.  Patient in understanding of plan of care. Patient with no further questions for the RN. Resting in comfort. Call bell within reach and encouraged to use when assistance needed. Will continue to monitor.

## 2022-11-21 NOTE — H&P ADULT - ASSESSMENT
47 yr old male smoker, hypertension, diabetes mellitus presented initially with complaints of chest pain that started on Saturday. Initially admitted to observation, underwent a cardiac CT which revealed Heterogeneous plaque at the origin of the LAD results in moderate narrowing. Soft plaque in the mid LAD results in mild narrowing. High-grade focal stenosis in the proximal ramus intermedius (large vessel). Moderate narrowing of the proximal circumflex artery. Labs and imaging noted.    1. Chest pain, abnormal cardiac CT:  Plan for LHC in am  admit to telemetry  ECHO with normal LV  S/p Plavix load today  continue aspirin and plavix  continue statin, beta blockers    2. Uncontrolled diabetes mellitus:  A1C 11  endocrine eval in am  FS better now, will avoid starting Lantus tonight as NPO for cath in am  will need insulin on discharge    3. Uncontrolled hypertension:  Better now  continue current regimen.    4. DVT ppx:  SCDs    Discussed with patient and brother at bedside  counseled about smoking cessation.

## 2022-11-21 NOTE — H&P ADULT - HISTORY OF PRESENT ILLNESS
47 yr old male smoker, hypertension, diabetes mellitus presented initially with complaints of chest pain that started on Saturday. States he had mid sternal chest pain about 3 episodes, on and off, pressure like, radiating to both arms. Brother at bedside, reports patient was sweating at the time. He took some Tylenol without relief. He was admitted to observation and had an abnormal cardiac CT and hence being admitted for [facility].  He currently denies chest pain, no shortness of breath, leg swelling, bowel or bladder complaints. He admits to being non compliant with his antihypertensives and Metformin. Per brother at bedside, patient eats junk food, smokes heavily and works late hours.

## 2022-11-21 NOTE — ED CDU PROVIDER SUBSEQUENT DAY NOTE - PROGRESS NOTE DETAILS
pt stable throughout the day no further cp, Cardiac CT with plaque in LAD, seen by cards advising admit for cath

## 2022-11-22 ENCOUNTER — TRANSCRIPTION ENCOUNTER (OUTPATIENT)
Age: 47
End: 2022-11-22

## 2022-11-22 LAB
ALBUMIN SERPL ELPH-MCNC: 3.5 G/DL — SIGNIFICANT CHANGE UP (ref 3.3–5.2)
ALP SERPL-CCNC: 98 U/L — SIGNIFICANT CHANGE UP (ref 40–120)
ALT FLD-CCNC: 20 U/L — SIGNIFICANT CHANGE UP
ANION GAP SERPL CALC-SCNC: 14 MMOL/L — SIGNIFICANT CHANGE UP (ref 5–17)
AST SERPL-CCNC: 19 U/L — SIGNIFICANT CHANGE UP
BASOPHILS # BLD AUTO: 0.07 K/UL — SIGNIFICANT CHANGE UP (ref 0–0.2)
BASOPHILS NFR BLD AUTO: 0.5 % — SIGNIFICANT CHANGE UP (ref 0–2)
BILIRUB SERPL-MCNC: 0.5 MG/DL — SIGNIFICANT CHANGE UP (ref 0.4–2)
BLD GP AB SCN SERPL QL: SIGNIFICANT CHANGE UP
BUN SERPL-MCNC: 19.8 MG/DL — SIGNIFICANT CHANGE UP (ref 8–20)
CALCIUM SERPL-MCNC: 8.9 MG/DL — SIGNIFICANT CHANGE UP (ref 8.4–10.5)
CHLORIDE SERPL-SCNC: 100 MMOL/L — SIGNIFICANT CHANGE UP (ref 96–108)
CO2 SERPL-SCNC: 22 MMOL/L — SIGNIFICANT CHANGE UP (ref 22–29)
CREAT SERPL-MCNC: 0.7 MG/DL — SIGNIFICANT CHANGE UP (ref 0.5–1.3)
EGFR: 115 ML/MIN/1.73M2 — SIGNIFICANT CHANGE UP
EOSINOPHIL # BLD AUTO: 0.22 K/UL — SIGNIFICANT CHANGE UP (ref 0–0.5)
EOSINOPHIL NFR BLD AUTO: 1.5 % — SIGNIFICANT CHANGE UP (ref 0–6)
GLUCOSE BLDC GLUCOMTR-MCNC: 128 MG/DL — HIGH (ref 70–99)
GLUCOSE BLDC GLUCOMTR-MCNC: 153 MG/DL — HIGH (ref 70–99)
GLUCOSE BLDC GLUCOMTR-MCNC: 206 MG/DL — HIGH (ref 70–99)
GLUCOSE BLDC GLUCOMTR-MCNC: 222 MG/DL — HIGH (ref 70–99)
GLUCOSE SERPL-MCNC: 156 MG/DL — HIGH (ref 70–99)
HCT VFR BLD CALC: 46.6 % — SIGNIFICANT CHANGE UP (ref 39–50)
HGB BLD-MCNC: 16.4 G/DL — SIGNIFICANT CHANGE UP (ref 13–17)
IMM GRANULOCYTES NFR BLD AUTO: 0.8 % — SIGNIFICANT CHANGE UP (ref 0–0.9)
INR BLD: 1.11 RATIO — SIGNIFICANT CHANGE UP (ref 0.88–1.16)
LYMPHOCYTES # BLD AUTO: 32.1 % — SIGNIFICANT CHANGE UP (ref 13–44)
LYMPHOCYTES # BLD AUTO: 4.71 K/UL — HIGH (ref 1–3.3)
MCHC RBC-ENTMCNC: 29.5 PG — SIGNIFICANT CHANGE UP (ref 27–34)
MCHC RBC-ENTMCNC: 35.2 GM/DL — SIGNIFICANT CHANGE UP (ref 32–36)
MCV RBC AUTO: 84 FL — SIGNIFICANT CHANGE UP (ref 80–100)
MONOCYTES # BLD AUTO: 0.9 K/UL — SIGNIFICANT CHANGE UP (ref 0–0.9)
MONOCYTES NFR BLD AUTO: 6.1 % — SIGNIFICANT CHANGE UP (ref 2–14)
NEUTROPHILS # BLD AUTO: 8.67 K/UL — HIGH (ref 1.8–7.4)
NEUTROPHILS NFR BLD AUTO: 59 % — SIGNIFICANT CHANGE UP (ref 43–77)
PLATELET # BLD AUTO: 221 K/UL — SIGNIFICANT CHANGE UP (ref 150–400)
POTASSIUM SERPL-MCNC: 3.8 MMOL/L — SIGNIFICANT CHANGE UP (ref 3.5–5.3)
POTASSIUM SERPL-SCNC: 3.8 MMOL/L — SIGNIFICANT CHANGE UP (ref 3.5–5.3)
PROT SERPL-MCNC: 7 G/DL — SIGNIFICANT CHANGE UP (ref 6.6–8.7)
PROTHROM AB SERPL-ACNC: 12.9 SEC — SIGNIFICANT CHANGE UP (ref 10.5–13.4)
RBC # BLD: 5.55 M/UL — SIGNIFICANT CHANGE UP (ref 4.2–5.8)
RBC # FLD: 12.6 % — SIGNIFICANT CHANGE UP (ref 10.3–14.5)
SODIUM SERPL-SCNC: 136 MMOL/L — SIGNIFICANT CHANGE UP (ref 135–145)
WBC # BLD: 14.69 K/UL — HIGH (ref 3.8–10.5)
WBC # FLD AUTO: 14.69 K/UL — HIGH (ref 3.8–10.5)

## 2022-11-22 PROCEDURE — 93454 CORONARY ARTERY ANGIO S&I: CPT | Mod: 26,59

## 2022-11-22 PROCEDURE — 92941 PRQ TRLML REVSC TOT OCCL AMI: CPT | Mod: LC

## 2022-11-22 PROCEDURE — 93010 ELECTROCARDIOGRAM REPORT: CPT

## 2022-11-22 PROCEDURE — 92978 ENDOLUMINL IVUS OCT C 1ST: CPT | Mod: 26,LD

## 2022-11-22 PROCEDURE — 99232 SBSQ HOSP IP/OBS MODERATE 35: CPT | Mod: 25

## 2022-11-22 PROCEDURE — 99152 MOD SED SAME PHYS/QHP 5/>YRS: CPT

## 2022-11-22 PROCEDURE — 99233 SBSQ HOSP IP/OBS HIGH 50: CPT

## 2022-11-22 PROCEDURE — 99223 1ST HOSP IP/OBS HIGH 75: CPT

## 2022-11-22 RX ORDER — SODIUM CHLORIDE 9 MG/ML
250 INJECTION INTRAMUSCULAR; INTRAVENOUS; SUBCUTANEOUS ONCE
Refills: 0 | Status: DISCONTINUED | OUTPATIENT
Start: 2022-11-22 | End: 2022-11-23

## 2022-11-22 RX ORDER — MORPHINE SULFATE 50 MG/1
2 CAPSULE, EXTENDED RELEASE ORAL ONCE
Refills: 0 | Status: DISCONTINUED | OUTPATIENT
Start: 2022-11-22 | End: 2022-11-22

## 2022-11-22 RX ORDER — PANTOPRAZOLE SODIUM 20 MG/1
40 TABLET, DELAYED RELEASE ORAL ONCE
Refills: 0 | Status: COMPLETED | OUTPATIENT
Start: 2022-11-22 | End: 2022-11-22

## 2022-11-22 RX ORDER — ATORVASTATIN CALCIUM 80 MG/1
80 TABLET, FILM COATED ORAL AT BEDTIME
Refills: 0 | Status: DISCONTINUED | OUTPATIENT
Start: 2022-11-22 | End: 2022-11-23

## 2022-11-22 RX ORDER — EPTIFIBATIDE 2 MG/ML
2 INJECTION, SOLUTION INTRAVENOUS
Qty: 75 | Refills: 0 | Status: DISCONTINUED | OUTPATIENT
Start: 2022-11-22 | End: 2022-11-23

## 2022-11-22 RX ORDER — NICOTINE POLACRILEX 2 MG
1 GUM BUCCAL DAILY
Refills: 0 | Status: DISCONTINUED | OUTPATIENT
Start: 2022-11-22 | End: 2022-11-23

## 2022-11-22 RX ORDER — INSULIN GLARGINE 100 [IU]/ML
16 INJECTION, SOLUTION SUBCUTANEOUS AT BEDTIME
Refills: 0 | Status: DISCONTINUED | OUTPATIENT
Start: 2022-11-22 | End: 2022-11-22

## 2022-11-22 RX ADMIN — ATORVASTATIN CALCIUM 80 MILLIGRAM(S): 80 TABLET, FILM COATED ORAL at 21:27

## 2022-11-22 RX ADMIN — Medication 2: at 08:18

## 2022-11-22 RX ADMIN — PANTOPRAZOLE SODIUM 40 MILLIGRAM(S): 20 TABLET, DELAYED RELEASE ORAL at 16:39

## 2022-11-22 RX ADMIN — Medication 100 MILLIGRAM(S): at 05:33

## 2022-11-22 RX ADMIN — CLOPIDOGREL BISULFATE 75 MILLIGRAM(S): 75 TABLET, FILM COATED ORAL at 10:16

## 2022-11-22 RX ADMIN — EPTIFIBATIDE 14.4 MICROGRAM(S)/KG/MIN: 2 INJECTION, SOLUTION INTRAVENOUS at 16:52

## 2022-11-22 RX ADMIN — Medication 2: at 13:40

## 2022-11-22 RX ADMIN — EPTIFIBATIDE 14.4 MICROGRAM(S)/KG/MIN: 2 INJECTION, SOLUTION INTRAVENOUS at 21:28

## 2022-11-22 RX ADMIN — MORPHINE SULFATE 2 MILLIGRAM(S): 50 CAPSULE, EXTENDED RELEASE ORAL at 16:30

## 2022-11-22 RX ADMIN — Medication 81 MILLIGRAM(S): at 10:16

## 2022-11-22 RX ADMIN — LISINOPRIL 20 MILLIGRAM(S): 2.5 TABLET ORAL at 17:17

## 2022-11-22 RX ADMIN — LISINOPRIL 20 MILLIGRAM(S): 2.5 TABLET ORAL at 05:33

## 2022-11-22 NOTE — CONSULT NOTE ADULT - SUBJECTIVE AND OBJECTIVE BOX
Patient is a 47y old  Male who presents with a chief complaint of chest pain (22 Nov 2022 11:02)    HPI:  47M smoker w/ HTN, T2DM presented initially with complaints of sternal chest pain, radiating to both arms. Per brother at bedside, patient eats junk food, smokes heavily and works late hours. He was admitted to observation and had an abnormal cardiac CT and hence being admitted for [facility].  Consult for diabetes mgmt. A1c 11.8          PAST MEDICAL & SURGICAL HISTORY:  HTN (hypertension)    DM (diabetes mellitus)        Social History:  see above    FAMILY HISTORY:  FH: heart disease (Father)          Allergies    No Known Allergies    Intolerances        REVIEW OF SYSTEMS:    CONSTITUTIONAL: No fever, weight loss, or fatigue  EYES: No eye pain, visual disturbances, or discharge  ENMT:  No difficulty hearing, tinnitus, vertigo; No sinus or throat pain  NECK: No pain or stiffness  RESPIRATORY: No cough, wheezing, chills or hemoptysis; No shortness of breath  CARDIOVASCULAR: No chest pain, palpitations, dizziness, or leg swelling  GASTROINTESTINAL: No abdominal or epigastric pain. No nausea, vomiting, or hematemesis; No diarrhea or constipation. No melena or hematochezia.  NEUROLOGICAL: No headaches, memory loss, loss of strength, numbness, or tremors  SKIN: No itching, burning, rashes, or lesions   MUSCULOSKELETAL: No joint pain or swelling; No muscle, back, or extremity pain  PSYCHIATRIC: No depression, anxiety, mood swings, or difficulty sleeping        MEDICATIONS  (STANDING):  aspirin  chewable 81 milliGRAM(s) Oral daily  atorvastatin 40 milliGRAM(s) Oral at bedtime  clopidogrel Tablet 75 milliGRAM(s) Oral daily  dextrose 5%. 1000 milliLiter(s) (100 mL/Hr) IV Continuous <Continuous>  dextrose 5%. 1000 milliLiter(s) (50 mL/Hr) IV Continuous <Continuous>  dextrose 50% Injectable 25 Gram(s) IV Push once  dextrose 50% Injectable 12.5 Gram(s) IV Push once  dextrose 50% Injectable 25 Gram(s) IV Push once  glucagon  Injectable 1 milliGRAM(s) IntraMuscular once  insulin lispro (ADMELOG) corrective regimen sliding scale   SubCutaneous three times a day before meals  lisinopril 20 milliGRAM(s) Oral two times a day  metoprolol tartrate 100 milliGRAM(s) Oral two times a day  sodium chloride 0.9% Bolus 250 milliLiter(s) IV Bolus once    MEDICATIONS  (PRN):  acetaminophen     Tablet .. 650 milliGRAM(s) Oral every 6 hours PRN Temp greater or equal to 38C (100.4F), Mild Pain (1 - 3)  aluminum hydroxide/magnesium hydroxide/simethicone Suspension 30 milliLiter(s) Oral every 4 hours PRN Dyspepsia  dextrose Oral Gel 15 Gram(s) Oral once PRN Blood Glucose LESS THAN 70 milliGRAM(s)/deciliter  melatonin 3 milliGRAM(s) Oral at bedtime PRN Insomnia  ondansetron Injectable 4 milliGRAM(s) IV Push every 8 hours PRN Nausea and/or Vomiting      Vital Signs Last 24 Hrs  T(C): 36.7 (22 Nov 2022 07:41), Max: 36.9 (21 Nov 2022 23:15)  T(F): 98.1 (22 Nov 2022 07:41), Max: 98.4 (21 Nov 2022 23:15)  HR: 53 (22 Nov 2022 10:03) (47 - 58)  BP: 164/88 (22 Nov 2022 10:03) (127/77 - 164/88)  BP(mean): --  RR: 15 (22 Nov 2022 10:03) (15 - 18)  SpO2: 99% (22 Nov 2022 10:03) (96% - 99%)    Parameters below as of 22 Nov 2022 10:03  Patient On (Oxygen Delivery Method): room air          Physical Exam:    Constitutional: NAD, well-developed  HEENT: EOMI, no exophalmos  Neck: trachea midline, no thyroid enlargement  Respiratory: CTAB, normal respirations  Cardiovascular: S1 and S2, RRR  Gastrointestinal: BS+, soft, ntnd  Extremities: No peripheral edema  Neurological: AOx3, no focal deficits  Psychiatric: Normal mood and normal affect  Skin: no rashes, no acanthosis    LABS  11-22    136  |  100  |  19.8  ----------------------------<  156<H>  3.8   |  22.0  |  0.70    Ca    8.9      22 Nov 2022 03:32    TPro  7.0  /  Alb  3.5  /  TBili  0.5  /  DBili  x   /  AST  19  /  ALT  20  /  AlkPhos  98  11-22                          16.4   14.69 )-----------( 221      ( 22 Nov 2022 03:32 )             46.6       A1C with Estimated Average Glucose Result: 11.8 % (11-20-22 @ 19:47)        Ketone - Urine: Negative (11-21 @ 11:24)    Alanine Aminotransferase (ALT/SGPT): 20 U/L (11-22-22 @ 03:32)  Alkaline Phosphatase, Serum: 98 U/L (11-22-22 @ 03:32)  Albumin, Serum: 3.5 g/dL (11-22-22 @ 03:32)  Aspartate Aminotransferase (AST/SGOT): 19 U/L (11-22-22 @ 03:32)  Aspartate Aminotransferase (AST/SGOT): 15 U/L (11-21-22 @ 08:04)  Alanine Aminotransferase (ALT/SGPT): 20 U/L (11-21-22 @ 08:04)  Albumin, Serum: 3.7 g/dL (11-21-22 @ 08:04)  Alkaline Phosphatase, Serum: 96 U/L (11-21-22 @ 08:04)      Lipase, Serum: 34 U/L (11-19-22 @ 21:00)      CAPILLARY BLOOD GLUCOSE      POCT Blood Glucose.: 222 mg/dL (22 Nov 2022 13:37)  POCT Blood Glucose.: 206 mg/dL (22 Nov 2022 08:12)  POCT Blood Glucose.: 163 mg/dL (21 Nov 2022 22:15)  POCT Blood Glucose.: 149 mg/dL (21 Nov 2022 17:11)      Imaging     Patient is a 47y old  Male who presents with a chief complaint of chest pain (22 Nov 2022 11:02)    HPI:  47M, from [place], smoker w/ HTN, T2DM presented initially with complaints of sternal chest pain, radiating to both arms. Per brother at bedside, patient eats junk food, smokes heavily and works late hours. He was admitted to observation and had an abnormal cardiac CT and hence being admitted for C.  Consult for diabetes mgmt. A1c 11.8    seen in the cath lab  got 1 stent to LAD, might get another stent tomorrow  diabetes for about 3 years  fhx of diabetes in mother  supposed to take metformin 1000mg twice a day but has not been consistently taking  reports high carb diet- muffins, croissants, cake, bagels  works as a , lives with wife and kids at home  active smoker, no IVDU      PAST MEDICAL & SURGICAL HISTORY:  HTN (hypertension)    DM (diabetes mellitus)        Social History:  see above    FAMILY HISTORY:  FH: heart disease (Father)          Allergies    No Known Allergies    Intolerances        REVIEW OF SYSTEMS:    CONSTITUTIONAL: No fever, weight loss, or fatigue  EYES: No eye pain, visual disturbances, or discharge  ENMT:  No difficulty hearing, tinnitus, vertigo; No sinus or throat pain  NECK: No pain or stiffness  RESPIRATORY: No cough, wheezing, chills or hemoptysis; No shortness of breath  CARDIOVASCULAR: No chest pain, palpitations, dizziness, or leg swelling  GASTROINTESTINAL: No abdominal or epigastric pain. No nausea, vomiting, or hematemesis; No diarrhea or constipation. No melena or hematochezia.  NEUROLOGICAL: No headaches, memory loss, loss of strength, numbness, or tremors  SKIN: No itching, burning, rashes, or lesions   MUSCULOSKELETAL: No joint pain or swelling; No muscle, back, or extremity pain  PSYCHIATRIC: No depression, anxiety, mood swings, or difficulty sleeping        MEDICATIONS  (STANDING):  aspirin  chewable 81 milliGRAM(s) Oral daily  atorvastatin 40 milliGRAM(s) Oral at bedtime  clopidogrel Tablet 75 milliGRAM(s) Oral daily  dextrose 5%. 1000 milliLiter(s) (100 mL/Hr) IV Continuous <Continuous>  dextrose 5%. 1000 milliLiter(s) (50 mL/Hr) IV Continuous <Continuous>  dextrose 50% Injectable 25 Gram(s) IV Push once  dextrose 50% Injectable 12.5 Gram(s) IV Push once  dextrose 50% Injectable 25 Gram(s) IV Push once  glucagon  Injectable 1 milliGRAM(s) IntraMuscular once  insulin lispro (ADMELOG) corrective regimen sliding scale   SubCutaneous three times a day before meals  lisinopril 20 milliGRAM(s) Oral two times a day  metoprolol tartrate 100 milliGRAM(s) Oral two times a day  sodium chloride 0.9% Bolus 250 milliLiter(s) IV Bolus once    MEDICATIONS  (PRN):  acetaminophen     Tablet .. 650 milliGRAM(s) Oral every 6 hours PRN Temp greater or equal to 38C (100.4F), Mild Pain (1 - 3)  aluminum hydroxide/magnesium hydroxide/simethicone Suspension 30 milliLiter(s) Oral every 4 hours PRN Dyspepsia  dextrose Oral Gel 15 Gram(s) Oral once PRN Blood Glucose LESS THAN 70 milliGRAM(s)/deciliter  melatonin 3 milliGRAM(s) Oral at bedtime PRN Insomnia  ondansetron Injectable 4 milliGRAM(s) IV Push every 8 hours PRN Nausea and/or Vomiting      Vital Signs Last 24 Hrs  T(C): 36.7 (22 Nov 2022 07:41), Max: 36.9 (21 Nov 2022 23:15)  T(F): 98.1 (22 Nov 2022 07:41), Max: 98.4 (21 Nov 2022 23:15)  HR: 53 (22 Nov 2022 10:03) (47 - 58)  BP: 164/88 (22 Nov 2022 10:03) (127/77 - 164/88)  BP(mean): --  RR: 15 (22 Nov 2022 10:03) (15 - 18)  SpO2: 99% (22 Nov 2022 10:03) (96% - 99%)    Parameters below as of 22 Nov 2022 10:03  Patient On (Oxygen Delivery Method): room air          Physical Exam:    Constitutional: NAD, well-developed  HEENT: EOMI, no exophalmos  Neck: trachea midline, no thyroid enlargement  Respiratory: CTAB, normal respirations  Cardiovascular: S1 and S2, RRR  Gastrointestinal: BS+, soft, ntnd  Extremities: No peripheral edema  Neurological: AOx3, no focal deficits  Psychiatric: Normal mood and normal affect  Skin: no rashes, no acanthosis    LABS  11-22    136  |  100  |  19.8  ----------------------------<  156<H>  3.8   |  22.0  |  0.70    Ca    8.9      22 Nov 2022 03:32    TPro  7.0  /  Alb  3.5  /  TBili  0.5  /  DBili  x   /  AST  19  /  ALT  20  /  AlkPhos  98  11-22                          16.4   14.69 )-----------( 221      ( 22 Nov 2022 03:32 )             46.6       A1C with Estimated Average Glucose Result: 11.8 % (11-20-22 @ 19:47)        Ketone - Urine: Negative (11-21 @ 11:24)    Alanine Aminotransferase (ALT/SGPT): 20 U/L (11-22-22 @ 03:32)  Alkaline Phosphatase, Serum: 98 U/L (11-22-22 @ 03:32)  Albumin, Serum: 3.5 g/dL (11-22-22 @ 03:32)  Aspartate Aminotransferase (AST/SGOT): 19 U/L (11-22-22 @ 03:32)  Aspartate Aminotransferase (AST/SGOT): 15 U/L (11-21-22 @ 08:04)  Alanine Aminotransferase (ALT/SGPT): 20 U/L (11-21-22 @ 08:04)  Albumin, Serum: 3.7 g/dL (11-21-22 @ 08:04)  Alkaline Phosphatase, Serum: 96 U/L (11-21-22 @ 08:04)      Lipase, Serum: 34 U/L (11-19-22 @ 21:00)      CAPILLARY BLOOD GLUCOSE      POCT Blood Glucose.: 222 mg/dL (22 Nov 2022 13:37)  POCT Blood Glucose.: 206 mg/dL (22 Nov 2022 08:12)  POCT Blood Glucose.: 163 mg/dL (21 Nov 2022 22:15)  POCT Blood Glucose.: 149 mg/dL (21 Nov 2022 17:11)      Imaging

## 2022-11-22 NOTE — PROGRESS NOTE ADULT - SUBJECTIVE AND OBJECTIVE BOX
[facility]                                                         CARDIOLOGY AT [facility]                                                                  [address] [facility], [facility] [place] [address]                                                         Telephone: [phone]. Fax:[phone]                                                                             PROGRESS NOTE    Reason for follow up:  Chest pain  Update:   Pending [facility] today  Review of symptoms:   Cardiac:  No chest pain. No dyspnea. No palpitations.  Respiratory: no cough. No dyspnea  Gastrointestinal: No diarrhea. No abdominal pain. No bleeding.   Neuro: No focal neuro complaints.    Vitals:  T(C): 36.7 (11-22-22 @ 07:41), Max: 36.9 (11-21-22 @ 23:15)  HR: 53 (11-22-22 @ 10:03) (47 - 58)  BP: 164/88 (11-22-22 @ 10:03) (127/77 - 164/88)  RR: 15 (11-22-22 @ 10:03) (15 - 18)  SpO2: 99% (11-22-22 @ 10:03) (96% - 99%)  I&O's Summary    Weight (kg): 89.8 (11-19 @ 19:32)    PHYSICAL EXAM:  Appearance: Comfortable. No acute distress  HEENT:  Atraumatic. Normocephalic.  Normal oral mucosa  Neurologic: A & O x 3, no gross focal deficits.  Cardiovascular: RRR S1 S2, No murmur, no rubs/gallops. No JVD  Respiratory: Lungs clear to auscultation, unlabored   Gastrointestinal:  Soft, Non-tender, + BS  Lower Extremities: 2+ Peripheral Pulses, No clubbing, cyanosis, or edema  Psychiatry: Patient is calm. No agitation.   Skin: warm and dry.    CURRENT CARDIAC MEDICATIONS:  lisinopril 20 milliGRAM(s) Oral two times a day  metoprolol tartrate 100 milliGRAM(s) Oral two times a day    CURRENT OTHER MEDICATIONS:  acetaminophen     Tablet .. 650 milliGRAM(s) Oral every 6 hours PRN Temp greater or equal to 38C (100.4F), Mild Pain (1 - 3)  melatonin 3 milliGRAM(s) Oral at bedtime PRN Insomnia  ondansetron Injectable 4 milliGRAM(s) IV Push every 8 hours PRN Nausea and/or Vomiting  aluminum hydroxide/magnesium hydroxide/simethicone Suspension 30 milliLiter(s) Oral every 4 hours PRN Dyspepsia  atorvastatin 40 milliGRAM(s) Oral at bedtime  dextrose 50% Injectable 25 Gram(s) IV Push once, Stop order after: 1 Doses  dextrose 50% Injectable 12.5 Gram(s) IV Push once, Stop order after: 1 Doses  dextrose 50% Injectable 25 Gram(s) IV Push once, Stop order after: 1 Doses  dextrose Oral Gel 15 Gram(s) Oral once, Stop order after: 1 Doses PRN Blood Glucose LESS THAN 70 milliGRAM(s)/deciliter  glucagon  Injectable 1 milliGRAM(s) IntraMuscular once, Stop order after: 1 Doses  insulin lispro (ADMELOG) corrective regimen sliding scale   SubCutaneous three times a day before meals  aspirin  chewable 81 milliGRAM(s) Oral daily  clopidogrel Tablet 75 milliGRAM(s) Oral daily  sodium chloride 0.9% Bolus 250 milliLiter(s) IV Bolus once, Stop order after: 1 Doses    LABS:	 	  ( 21 Nov 2022 08:04 )  Troponin T  0.01 ,  CPK  X    , CKMB  X    , BNP X        , ( 20 Nov 2022 04:07 )  Troponin T  <0.01,  CPK  X    , CKMB  X    , BNP X        , ( 19 Nov 2022 21:00 )  Troponin T  <0.01,  CPK  X    , CKMB  X    ,                           16.4   14.69 )-----------( 221      ( 22 Nov 2022 03:32 )             46.6     11-22    136  |  100  |  19.8  ----------------------------<  156<H>  3.8   |  22.0  |  0.70    Ca    8.9      22 Nov 2022 03:32    TPro  7.0  /  Alb  3.5  /  TBili  0.5  /  DBili  x   /  AST  19  /  ALT  20  /  AlkPhos  98  11-22    PT/INR/PTT ( 22 Nov 2022 03:32 )                       :                       :      12.9         :       X                     .        .                   .              .           .       1.11        .                                         TELEMETRY:   SB 45

## 2022-11-22 NOTE — CONSULT NOTE ADULT - ASSESSMENT
Now s/p LHC via RRA with Dr. Romario Olivarez. RRA access site stable, no bleed/hematoma, distal pulse +2 ,   Intraprocedurally:  Findings: Lcx 95% stenosis s/p 2 WENDY; Proximal Ramus Intermedium 95% stenosis.  Post Cath EKG:     Plan:  -Formal cath report pending  -Post procedure management/monitoring per protocol  -Access site precautions  -Radial compression band removal at ***  -Bedrest x ***hours post procedure  -Labs and EKG in am  -NS 0.9% 250ml/hr x 1 bolus: post procedure GENE ppx   -Repeat ECG if any clinical indication or change on tele  -Continue current medical therapy  -Dual anti platelet therapy with aspirin/plavix **  -Cont BB with Toprol 50mg po daily **  -Cont statin therapy with Lipitor 10mg po qHS **  -Educated regarding strict adherence with DAPT   -Educated regarding post procedure management and care  -Discussed the importance of RF modification  -Cardiac rehab info provided/referral and communication to cardiac rehab completed  -F/U outpt in 1-2 weeks with Cardiologist  ***  -DISPO: *** Plan for D/C in am if remains HDS, ECG and labs in am stable and without complications     Now s/p LHC via RRA with Dr. Romario Olivarez. RRA access site stable, no bleed/hematoma, distal pulse +2 ,   Intraprocedurally: Midazolam 1mg; Fentanyl 75mcg; Lidocaine 2% 10ml; Verapamil 5mg IA; Heparin 9000 units IV; Nitroglycerin 300mcg IC; Eptifibatide IV bolus 7.9mg x2; Clopidogrel 300mg PO; NS bolus 700ml; Omnipaque 99ml  Findings: Lcx 95% stenosis s/p 2 WENDY (DEBBIE FRONTIER 3.5 x 18.0 MM) (DEBBIE FRONTIER 3.5 X 18MM)  Proximal Ramus Intermedius 95% stenosis. Thrombus noted in proximal ramus intermedius. Intergrellin bolus IV x 2 given in lab. Integrellin gtt started in lab. (PRELIMINARY REPORT, PENDING OFFICIAL REPORT)  Post Cath EKG: Sinus Bradycardia reviewed by Dr. Romario Olivarez.     Plan:  -Formal cath report pending  -Post procedure management/monitoring per protocol  -Access site precautions  -Radial compression band removal at 1800  -Bedrest x 12 hours while on integrellin gtt  -Integrellin infusion @ 2mcg/kg/min x 12 hours (until 4 AM) for thrombus  -Labs and EKG in am  -NS 0.9% 250ml/hr x 1 bolus: post procedure GENE ppx   -Repeat ECG if any clinical indication or change on tele  -Continue current medical therapy  -Dual anti platelet therapy with aspirin/plavix **  -Cont BB with Toprol 100mg po q 12  -Cont statin therapy with Lipitor 80mg po qHS **  -Educated regarding strict adherence with DAPT   -Educated regarding post procedure management and care  -Discussed the importance of RF modification  -Cardiac rehab info provided/referral and communication to cardiac rehab completed  -Patient to return to cardiac cath lab possibly tomorrow 11/23 or later this week for PCI of ramus intermedius  -Endocrine consult appreciated for A1C 11.8  -Further care per hospitalist     Now s/p LHC via RRA with Dr. Romario Olivarez. RRA access site stable, no bleed/hematoma, distal pulse +2 ,   Intraprocedurally: Midazolam 1mg; Fentanyl 75mcg; Lidocaine 2% 10ml; Verapamil 5mg IA; Heparin 9000 units IV; Nitroglycerin 300mcg IC; Eptifibatide IV bolus 7.9mg x2; Clopidogrel 300mg PO; NS bolus 700ml; Omnipaque 99ml  Findings: Lcx 95% stenosis with thrombus noted s/p 2 WENDY (DEBBIE FRONTIER 3.5 x 18.0 MM) (DEBBIE FRONTIER 3.5 X 18MM)  Proximal Ramus Intermedius 95% stenosis. Thrombus noted in proximal ramus intermedius. Intergrellin bolus IV x 2 given in lab. Integrellin gtt started in lab. (PRELIMINARY REPORT, PENDING OFFICIAL REPORT)  Post Cath EKG: Sinus Bradycardia reviewed by Dr. Romario Olivarez.   On cath lab table, s/p PCI pt c/o 6/10 midsternal chest pressure associated with left jaw pain and burning sensation. STAT EKG performed on cath lab table - reviewed by Dr. Olivarez Sinus bradycardia with T wave appears more peaked in V2-V5. 2mg ivp morphine, 40mg iv protonix, malalox x1 given. IV intregrellin gtt was initiated at 2mcg/kg/min. Patient now in cardiac cath lab holding area reporting improvement of chest pain - nearly resolved 1/10. repeat ECG performed in holding area and appears stable.     Plan:  -Formal cath report pending  -Post procedure management/monitoring per protocol  -Access site precautions  -Radial compression band removal at 1800 if no evidence of bleeding or wrist hematoma./   -Bedrest x 12 hours while on integrellin gtt  -Integrellin infusion @ 2mcg/kg/min x 12 hours (until 4 AM) for thrombus  -Labs and EKG in am  -NS 0.9% 250ml/hr x 1 bolus: post procedure GENE ppx   -Repeat ECG if any clinical indication or change on tele  -Continue current medical therapy  -Dual anti platelet therapy with aspirin/plavix   -Cont BB with Toprol 100mg po q 12 with hold parameters/   -Cont statin therapy with Lipitor 80mg po qHS **  -Educated regarding strict adherence with DAPT   -Educated regarding post procedure management and care  -Discussed the importance of RF modification  -Cardiac rehab info provided/referral and communication to cardiac rehab completed  -Patient to return to cardiac cath lab likely tomorrow on 11/23 for PCI of ramus intermedius  -Endocrine consult appreciated for A1C 11.8  -Further care per hospitalist/ cardiology teams

## 2022-11-22 NOTE — PROGRESS NOTE ADULT - ASSESSMENT
47 yr old male smoker, hypertension, diabetes mellitus presented initially with complaints of chest pain that started on Saturday. Initially admitted to observation, underwent a cardiac CT which revealed Heterogeneous plaque at the origin of the LAD results in moderate narrowing. Soft plaque in the mid LAD results in mild narrowing. High-grade focal stenosis in the proximal ramus intermedius (large vessel). Moderate narrowing of the proximal circumflex artery. Labs and imaging noted.    Chest pain, abnormal cardiac CT  - Telemetry monitoring  - C today  - TTE with normal LV  - Aspirin 81mg daily  - Plavix 5mg daily  - Lipitor 40mg nightly  - Lopressor 100mg BID    Uncontrolled DM  - A1c 11  - Endo consult  - FS with ISS  - Will likely need insulin on DC    HTN  - Lisinopril 20mg daily  - Lopressor 100mg BID    DVT ppx  - SCD    Dispo: DC pending cath findings

## 2022-11-22 NOTE — CONSULT NOTE ADULT - SUBJECTIVE AND OBJECTIVE BOX
[facility]                                              INTERVENTIONAL CARDIOLOGY AT [place] [address]                                             Telephone: [phone]. Fax:[phone]                                                       INTERVENTIONAL CARDIOLOGY CONSULTATION NOTE                                                                                             History obtained by: Patient and medical record  Community Cardiologist: Doesn't have one  Reason for Consultation: Evaluation for cardiac catheterization  Available pt records reviewed: Yes [ x ] No [  ]    Chief complaint:    Patient is a 47y old  Male who presents with a chief complaint of chest pain (2022 09:33)      HPI:  47 yr old male smoker, hypertension, diabetes mellitus presented initially with complaints of chest pain that started on Saturday. States he had mid sternal chest pain about 3 episodes, on and off, pressure like, radiating to both arms. Brother at bedside, reports patient was sweating at the time. He took some Tylenol without relief. He was admitted to observation and had an abnormal cardiac CT and hence being admitted for [facility].  He currently denies chest pain, no shortness of breath, leg swelling, bowel or bladder complaints. He admits to being non compliant with his antihypertensives and Metformin. Per brother at bedside, patient eats junk food, smokes heavily and works late hours. (2022 18:52)      Anginal Class:        Angina (Class): II       Ischemic Symptoms: chest pain, midsternal chest pain/ exertional dyspnea    Heart Failure: n/a       Systolic/Diastolic/Combined:        NYHA Class (within 2 weeks):       PAST MEDICAL HISTORY  HTN (hypertension)    DM (diabetes mellitus)        Associated Risk Factors:        Frailty Assessment: (none/mild/mod/severe):       Cerebrovascular Disease: N/A       Chronic Lung Disease: N/A       Peripheral Arterial Disease: N/A       Chronic Kidney Disease (if yes, what is GFR): N/A       Uncontrolled Diabetes (if yes, what is HgbA1C or FBS): Yes A1C 11.8       Poorly Controlled Hypertension (if yes, what is SBP): Yes       Morbid Obesity (if yes, what is BMI): N/A       History of Recent Ventricular Arrhythmia: N/A       Inability to Ambulate Safely: N/A       Need for Therapeutic Anticoagulation: N/A       Antiplatelet or Contrast Allergy: N/A      PAST SURGICAL HISTORY      SOCIAL HISTORY:  ***    FAMILY HISTORY:  FH: heart disease (Father)      Family History of Premature Cardiovascular Disease:  Yes [  ] No [ X ]    HOME MEDICATIONS:  lisinopril 20 mg oral tablet: 1 tab(s) orally 2 times a day (2022 17:58)  metFORMIN 1000 mg oral tablet: 1 tab(s) orally 2 times a day (2022 17:58)  Metoprolol Tartrate 100 mg oral tablet: 1 tab(s) orally 2 times a day (2022 17:58)      CURRENT CARDIAC MEDICATIONS:  lisinopril 20 milliGRAM(s) Oral two times a day  metoprolol tartrate 100 milliGRAM(s) Oral two times a day      Antianginal Therapies:        Beta Blockers:  Metoprolol 100mg BID       Calcium Channel Blockers: n/a       Long Acting Nitrates: n/a       Ranexa: n/a    ALLERGIES:   No Known Allergies      REVIEW OF SYMPTOMS:   CONSTITUTIONAL: no fever, no chills, no weight loss, no weight gain, no fatigue   CARDIOVASCULAR: no current chest pain, no chest pressure, no shortness of breath, no palpitations, no abdominal complaints including nausea/ vomiting, no indigestion, no orthopnea, no leg swelling, no syncope or presyncope  RESPIRATORY: no Shortness of breath, no cough, no wheezing  : No dysuria, no hematuria   GI: No dark color stool, no nausea, no diarrhea, no constipation, no abdominal pain   NEURO: No headache, no slurred speech   ALL OTHER REVIEW OF SYSTEMS ARE NEGATIVE.    VITAL SIGNS:  T(C): 36.7 (22 @ 07:41), Max: 36.9 (22 @ 23:15)  T(F): 98.1 (22 @ 07:41), Max: 98.4 (22 @ 23:15)  HR: 53 (22 @ 10:03) (47 - 58)  BP: 164/88 (22 @ 10:03) (127/77 - 164/88)  RR: 15 (22 @ 10:03) (15 - 20)  SpO2: 99% (22 @ 10:03) (95% - 99%)    INTAKE AND OUTPUT:       PHYSICAL EXAM:  Constitutional: Comfortable . No acute distress.   HEENT: Atraumatic and normocephalic , neck is supple . no JVD. No carotid bruit.  CNS: A&Ox3. No focal deficits.   Respiratory: CTAB, unlabored   Cardiovascular: RRR normal s1 s2. No murmur. No rubs or gallop.  Gastrointestinal: Soft, non-tender. +Bowel sounds.   Extremities: 2+ Peripheral Pulses, No clubbing, cyanosis, or edema  Psychiatric: Calm . no agitation.   Skin: Warm and dry, no ulcers on extremities     LABS:  ( 2022 08:04 )  Troponin T  0.01 ,  CPK  X    , CKMB  X    , BNP X        , ( 2022 04:07 )  Troponin T  <0.01,  CPK  X    , CKMB  X    , BNP X        , ( 2022 21:00 )  Troponin T  <0.01,  CPK  X    , CKMB  X    ,                                 16.4   14.69 )-----------( 221      ( 2022 03:32 )             46.6     11-    136  |  100  |  19.8  ----------------------------<  156<H>  3.8   |  22.0  |  0.70    Ca    8.9      2022 03:32    TPro  7.0  /  Alb  3.5  /  TBili  0.5  /  DBili  x   /  AST  19  /  ALT  20  /  AlkPhos  98  11-22    PT/INR - ( 2022 03:32 )   PT: 12.9 sec;   INR: 1.11 ratio           Urinalysis Basic - ( 2022 11:24 )    Color: Yellow / Appearance: Clear / S.010 / pH: x  Gluc: x / Ketone: Negative  / Bili: Negative / Urobili: Negative mg/dL   Blood: x / Protein: 30 mg/dL / Nitrite: Negative   Leuk Esterase: Negative / RBC: 0-2 /HPF / WBC 0-2 /HPF   Sq Epi: x / Non Sq Epi: Occasional / Bacteria: Occasional    ECG: < from: 12 Lead ECG (22 @ 19:30) >  Diagnosis Line Normal sinus rhythm    < end of copied text >    Prior ECG: Yes [  ] No [X  ]    CARDIAC TESTING   ECHO: < from: TTE Echo Complete w/ Contrast w/ Doppler (22 @ 08:49) >  Summary:   1. Leftventricular ejection fraction, by visual estimation, is 60 to   65%.   2. Normal global left ventricular systolic function.   3. Normal right ventricular size and function.   4. No significant valve disease.   5. There is no evidence of pericardial effusion    < end of copied text >      STRESS: n/a    Cardiac Interventions:  < from: CT Angio Cardiac w/ IV Cont (22 @ 09:53) >    IMPRESSION:    Heterogeneous plaque at the origin of the LAD results in moderate   narrowing.    Soft plaque in the mid LAD results in mild narrowing.    High-grade focal stenosis in the proximal ramus intermedius (large   vessel).    Moderate narrowing of the proximal circumflex artery.    The calcium score is MILD at 30 Agatston units, which is at the between   50-75th percentile, adjusted for age, gender and race.    < end of copied text >      Cardiac Cath Risk Assessments:  ASA: 3  Mallampati: 2  Bleeding Risk: 1.0%  Creatinine: 0.7  GFR: 115    ASSESSMENT: 47M PMH HTN, DM2 (A1C 11.8), smoker 10 years 1 PPD, reports noncompliance with medications, family history of CAD (father), family hx HTN/ DM (mother), has never personally seen a cardiologist p/w 4 episodes of midsternal, non radiating chest pain associated with b/l arm and hand pain, and shortness of breath. 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 He states that the longest episode was about 10-15 minutes. trop x3 negative.  EKG with NSR with nonspecific T wave abnormality. TTE: normal LVEF 60-65%, normal global LV systolic function and no significant valvular disease and no pericardial effusion. Cardiac CTA was performed and revealing calcium score of 30 in LAD; heterogenous plaque at origin of LAD resulting in moderate narrowing and high grade stenosis in proximal ramus intermedius (large vessel); and moderate narrowing in proximal Cx. Patient presents to [facility] for elective LHC and +/- PCI.    PLAN:  -Procedure and risks explained to patient  -Patient offered  but declines and states that he understands english  -Labs and EKG reviewed  -aspirin 81mg and plavix 75mg given pre procedure  -IV hydration protocol ordered to prevent GENE

## 2022-11-22 NOTE — CONSULT NOTE ADULT - ASSESSMENT
47M smoker w/ HTN, T2DM presented initially with complaints of sternal chest pain, radiating to both arms. Per brother at bedside, patient eats junk food, smokes heavily and works late hours. He was admitted to observation and had an abnormal cardiac CT and hence being admitted for [facility].  Consult for diabetes mgmt. A1c 11.8    Uncontrolled T2DM- hyperglycemic  -A1c 11.8  -check sugars AC and bedtime  -ensure diabetic diet  -start lantus 16 units QHS  -continue with insulin sliding scale  -new to insulin, needs to be seen by cde    CAD- care per cardiology    HLD- continue statin   47M smoker w/ HTN, T2DM presented initially with complaints of sternal chest pain, radiating to both arms. Per brother at bedside, patient eats junk food, smokes heavily and works late hours. He was admitted to observation and had an abnormal cardiac CT and hence being admitted for [facility].  Consult for diabetes mgmt. A1c 11.8    Uncontrolled T2DM- hyperglycemic related to not eating properly and noncompliance with meds  -A1c 11.8  -check sugars AC and bedtime  -ensure diabetic diet  -start lantus 16 units QHS  -continue with insulin sliding scale  -needs to be seen by nutrition  -might be able to be dced on orals vs basal insulin with orals, to be determined depending on how much he is on as an inpatient    CAD- s/p 1 stent to LAD, care per cardiology    HLD- continue statin   47M smoker w/ HTN, T2DM presented initially with complaints of sternal chest pain, radiating to both arms. Per brother at bedside, patient eats junk food, smokes heavily and works late hours. He was admitted to observation and had an abnormal cardiac CT and hence being admitted for [facility].  Consult for diabetes mgmt. A1c 11.8    Uncontrolled T2DM- hyperglycemic related to not eating properly and noncompliance with meds. FS reasonable at this point  -A1c 11.8  -check sugars AC and bedtime  -ensure diabetic diet  -continue with insulin sliding scale  -needs to be seen by nutrition  -might be able to be dced on orals vs basal insulin with orals, to be determined depending on how much he is on as an inpatient    CAD- s/p 1 stent to LAD, care per cardiology    HLD- continue statin

## 2022-11-22 NOTE — PROGRESS NOTE ADULT - SUBJECTIVE AND OBJECTIVE BOX
Chief complaint: chest pain    Patient seen and examined at bedside. No acute overnight events reported. No fever, chills, cough, nausea or vomiting. Patient denies any current chest pain. [facility] today.     Vital Signs Last 24 Hrs  T(F): 98.1 (22 Nov 2022 07:41), Max: 98.4 (21 Nov 2022 23:15)  HR: 47 (22 Nov 2022 07:41) (47 - 58)  BP: 142/85 (22 Nov 2022 07:41) (127/77 - 158/97)  RR: 18 (22 Nov 2022 07:41) (18 - 20)  SpO2: 97% (22 Nov 2022 07:41) (95% - 98%)    Physical Exam:  Constitutional: alert and oriented, in no acute distress   Neck: Soft and supple  Respiratory: Clear to auscultation bilaterally, no wheezes or crackles  Cardiovascular: Regular rate and rhyhtm, no murmurs, gallops, rubs  Gastrointestinal: Soft, non-tender to palpation, +bs  Vascular: 2+ peripheral pulses  Neurological: A/O x 3  Musculoskeletal: 5/5 strength b/l upper and lower extremities, no lower extremity edema bilaterally    Labs:                        16.4   14.69 )-----------( 221      ( 22 Nov 2022 03:32 )             46.6   11-22    136  |  100  |  19.8  ----------------------------<  156<H>  3.8   |  22.0  |  0.70    Ca    8.9      22 Nov 2022 03:32    TPro  7.0  /  Alb  3.5  /  TBili  0.5  /  DBili  x   /  AST  19  /  ALT  20  /  AlkPhos  98  11-22

## 2022-11-22 NOTE — PROGRESS NOTE ADULT - PROBLEM SELECTOR PLAN 1
: -telemetry monitoring  -cardiac markers x3 negative  -BP improved after receiving missed home antihypertensives and IV Labetalol  -TTE Ef , no valve disease, and normal function and no effusions noted.    -CCTA with high grade stenosis in pRamus, moderate narrow of pLCX and plaque in LAD moderate noted, calcium socre of 30.    Patient is a diabetic and AIC 11.8  smoking cessation.  continue with Lisinopril and Metoprolol   LHC in am  NPO after midnight  Load with Plavix 300mg po tonight and aspirin  Ct daily aspirin 81mg po daily and Plavix 75mg po daily.

## 2022-11-23 ENCOUNTER — TRANSCRIPTION ENCOUNTER (OUTPATIENT)
Age: 47
End: 2022-11-23

## 2022-11-23 ENCOUNTER — APPOINTMENT (OUTPATIENT)
Dept: CARDIOTHORACIC SURGERY | Facility: HOSPITAL | Age: 47
End: 2022-11-23

## 2022-11-23 DIAGNOSIS — I25.10 ATHEROSCLEROTIC HEART DISEASE OF NATIVE CORONARY ARTERY WITHOUT ANGINA PECTORIS: ICD-10-CM

## 2022-11-23 PROBLEM — Z00.00 ENCOUNTER FOR PREVENTIVE HEALTH EXAMINATION: Status: ACTIVE | Noted: 2022-11-23

## 2022-11-23 LAB
ABO RH CONFIRMATION: SIGNIFICANT CHANGE UP
ALBUMIN SERPL ELPH-MCNC: 3.4 G/DL — SIGNIFICANT CHANGE UP (ref 3.3–5.2)
ALP SERPL-CCNC: 51 U/L — SIGNIFICANT CHANGE UP (ref 40–120)
ALT FLD-CCNC: 13 U/L — SIGNIFICANT CHANGE UP
ANION GAP SERPL CALC-SCNC: 10 MMOL/L — SIGNIFICANT CHANGE UP (ref 5–17)
ANION GAP SERPL CALC-SCNC: 10 MMOL/L — SIGNIFICANT CHANGE UP (ref 5–17)
ANISOCYTOSIS BLD QL: SLIGHT — SIGNIFICANT CHANGE UP
APTT BLD: 26.7 SEC — LOW (ref 27.5–35.5)
AST SERPL-CCNC: 26 U/L — SIGNIFICANT CHANGE UP
BASE EXCESS BLDA CALC-SCNC: -0.4 MMOL/L — SIGNIFICANT CHANGE UP (ref -2–3)
BASE EXCESS BLDA CALC-SCNC: -1.1 MMOL/L — SIGNIFICANT CHANGE UP (ref -2–3)
BASE EXCESS BLDA CALC-SCNC: -1.9 MMOL/L — SIGNIFICANT CHANGE UP (ref -2–3)
BASE EXCESS BLDA CALC-SCNC: -2 MMOL/L — SIGNIFICANT CHANGE UP (ref -2–3)
BASE EXCESS BLDA CALC-SCNC: -3.3 MMOL/L — LOW (ref -2–3)
BASE EXCESS BLDA CALC-SCNC: 0.3 MMOL/L — SIGNIFICANT CHANGE UP (ref -2–3)
BASE EXCESS BLDA CALC-SCNC: 0.9 MMOL/L — SIGNIFICANT CHANGE UP (ref -2–3)
BASE EXCESS BLDA CALC-SCNC: 1.8 MMOL/L — SIGNIFICANT CHANGE UP (ref -2–3)
BASE EXCESS BLDV CALC-SCNC: -1 MMOL/L — SIGNIFICANT CHANGE UP (ref -2–3)
BASE EXCESS BLDV CALC-SCNC: -4.2 MMOL/L — LOW (ref -2–3)
BASE EXCESS BLDV CALC-SCNC: 0.3 MMOL/L — SIGNIFICANT CHANGE UP (ref -2–3)
BASOPHILS # BLD AUTO: 0 K/UL — SIGNIFICANT CHANGE UP (ref 0–0.2)
BASOPHILS NFR BLD AUTO: 0 % — SIGNIFICANT CHANGE UP (ref 0–2)
BILIRUB SERPL-MCNC: 1.2 MG/DL — SIGNIFICANT CHANGE UP (ref 0.4–2)
BUN SERPL-MCNC: 14.4 MG/DL — SIGNIFICANT CHANGE UP (ref 8–20)
BUN SERPL-MCNC: 18.2 MG/DL — SIGNIFICANT CHANGE UP (ref 8–20)
BURR CELLS BLD QL SMEAR: PRESENT — SIGNIFICANT CHANGE UP
CA-I BLDA-SCNC: 0.98 MMOL/L — LOW (ref 1.15–1.33)
CA-I BLDA-SCNC: 1.01 MMOL/L — LOW (ref 1.15–1.33)
CA-I BLDA-SCNC: 1.03 MMOL/L — LOW (ref 1.15–1.33)
CA-I BLDA-SCNC: 1.14 MMOL/L — LOW (ref 1.15–1.33)
CA-I BLDA-SCNC: 1.21 MMOL/L — SIGNIFICANT CHANGE UP (ref 1.15–1.33)
CA-I BLDA-SCNC: 1.31 MMOL/L — SIGNIFICANT CHANGE UP (ref 1.15–1.33)
CA-I SERPL-SCNC: 0.96 MMOL/L — LOW (ref 1.15–1.33)
CA-I SERPL-SCNC: 1.02 MMOL/L — LOW (ref 1.15–1.33)
CA-I SERPL-SCNC: 1.25 MMOL/L — SIGNIFICANT CHANGE UP (ref 1.15–1.33)
CALCIUM SERPL-MCNC: 8.4 MG/DL — SIGNIFICANT CHANGE UP (ref 8.4–10.5)
CALCIUM SERPL-MCNC: 9 MG/DL — SIGNIFICANT CHANGE UP (ref 8.4–10.5)
CHLORIDE BLDA-SCNC: 106 MMOL/L — SIGNIFICANT CHANGE UP (ref 96–108)
CHLORIDE BLDA-SCNC: 107 MMOL/L — SIGNIFICANT CHANGE UP (ref 96–108)
CHLORIDE BLDA-SCNC: 108 MMOL/L — SIGNIFICANT CHANGE UP (ref 96–108)
CHLORIDE BLDA-SCNC: 109 MMOL/L — HIGH (ref 96–108)
CHLORIDE BLDV-SCNC: 106 MMOL/L — SIGNIFICANT CHANGE UP (ref 96–108)
CHLORIDE BLDV-SCNC: 106 MMOL/L — SIGNIFICANT CHANGE UP (ref 96–108)
CHLORIDE BLDV-SCNC: 107 MMOL/L — SIGNIFICANT CHANGE UP (ref 96–108)
CHLORIDE SERPL-SCNC: 104 MMOL/L — SIGNIFICANT CHANGE UP (ref 96–108)
CHLORIDE SERPL-SCNC: 106 MMOL/L — SIGNIFICANT CHANGE UP (ref 96–108)
CHOLEST SERPL-MCNC: 175 MG/DL — SIGNIFICANT CHANGE UP
CK MB CFR SERPL CALC: 15.9 NG/ML — HIGH (ref 0–6.7)
CK SERPL-CCNC: 185 U/L — SIGNIFICANT CHANGE UP (ref 30–200)
CO2 SERPL-SCNC: 24 MMOL/L — SIGNIFICANT CHANGE UP (ref 22–29)
CO2 SERPL-SCNC: 24 MMOL/L — SIGNIFICANT CHANGE UP (ref 22–29)
COHGB MFR BLDA: 0.7 % — SIGNIFICANT CHANGE UP
COHGB MFR BLDA: 0.7 % — SIGNIFICANT CHANGE UP
COHGB MFR BLDA: 0.8 % — SIGNIFICANT CHANGE UP
COHGB MFR BLDA: 0.9 % — SIGNIFICANT CHANGE UP
COHGB MFR BLDV: 1.4 % — SIGNIFICANT CHANGE UP
COHGB MFR BLDV: 1.5 % — SIGNIFICANT CHANGE UP
CREAT SERPL-MCNC: 0.66 MG/DL — SIGNIFICANT CHANGE UP (ref 0.5–1.3)
CREAT SERPL-MCNC: 0.72 MG/DL — SIGNIFICANT CHANGE UP (ref 0.5–1.3)
EGFR: 114 ML/MIN/1.73M2 — SIGNIFICANT CHANGE UP
EGFR: 117 ML/MIN/1.73M2 — SIGNIFICANT CHANGE UP
EOSINOPHIL # BLD AUTO: 0 K/UL — SIGNIFICANT CHANGE UP (ref 0–0.5)
EOSINOPHIL NFR BLD AUTO: 0 % — SIGNIFICANT CHANGE UP (ref 0–6)
FIBRINOGEN PPP-MCNC: 506 MG/DL — SIGNIFICANT CHANGE UP (ref 330–520)
GAS PNL BLDA: SIGNIFICANT CHANGE UP
GAS PNL BLDA: SIGNIFICANT CHANGE UP
GAS PNL BLDV: 134 MMOL/L — LOW (ref 136–145)
GAS PNL BLDV: 135 MMOL/L — LOW (ref 136–145)
GAS PNL BLDV: 137 MMOL/L — SIGNIFICANT CHANGE UP (ref 136–145)
GAS PNL BLDV: SIGNIFICANT CHANGE UP
GLUCOSE BLDA-MCNC: 113 MG/DL — HIGH (ref 70–99)
GLUCOSE BLDA-MCNC: 117 MG/DL — HIGH (ref 70–99)
GLUCOSE BLDA-MCNC: 120 MG/DL — HIGH (ref 70–99)
GLUCOSE BLDA-MCNC: 132 MG/DL — HIGH (ref 70–99)
GLUCOSE BLDA-MCNC: 148 MG/DL — HIGH (ref 70–99)
GLUCOSE BLDA-MCNC: 151 MG/DL — HIGH (ref 70–99)
GLUCOSE BLDA-MCNC: 163 MG/DL — HIGH (ref 70–99)
GLUCOSE BLDA-MCNC: 197 MG/DL — HIGH (ref 70–99)
GLUCOSE BLDC GLUCOMTR-MCNC: 142 MG/DL — HIGH (ref 70–99)
GLUCOSE BLDC GLUCOMTR-MCNC: 158 MG/DL — HIGH (ref 70–99)
GLUCOSE BLDC GLUCOMTR-MCNC: 159 MG/DL — HIGH (ref 70–99)
GLUCOSE BLDC GLUCOMTR-MCNC: 171 MG/DL — HIGH (ref 70–99)
GLUCOSE BLDC GLUCOMTR-MCNC: 183 MG/DL — HIGH (ref 70–99)
GLUCOSE BLDV-MCNC: 114 MG/DL — HIGH (ref 70–99)
GLUCOSE BLDV-MCNC: 145 MG/DL — HIGH (ref 70–99)
GLUCOSE BLDV-MCNC: 194 MG/DL — HIGH (ref 70–99)
GLUCOSE SERPL-MCNC: 166 MG/DL — HIGH (ref 70–99)
GLUCOSE SERPL-MCNC: 166 MG/DL — HIGH (ref 70–99)
HCO3 BLDA-SCNC: 22 MMOL/L — SIGNIFICANT CHANGE UP (ref 21–28)
HCO3 BLDA-SCNC: 23 MMOL/L — SIGNIFICANT CHANGE UP (ref 21–28)
HCO3 BLDA-SCNC: 23 MMOL/L — SIGNIFICANT CHANGE UP (ref 21–28)
HCO3 BLDA-SCNC: 24 MMOL/L — SIGNIFICANT CHANGE UP (ref 21–28)
HCO3 BLDA-SCNC: 24 MMOL/L — SIGNIFICANT CHANGE UP (ref 21–28)
HCO3 BLDA-SCNC: 25 MMOL/L — SIGNIFICANT CHANGE UP (ref 21–28)
HCO3 BLDA-SCNC: 25 MMOL/L — SIGNIFICANT CHANGE UP (ref 21–28)
HCO3 BLDA-SCNC: 27 MMOL/L — SIGNIFICANT CHANGE UP (ref 21–28)
HCO3 BLDV-SCNC: 22 MMOL/L — SIGNIFICANT CHANGE UP (ref 22–29)
HCO3 BLDV-SCNC: 25 MMOL/L — SIGNIFICANT CHANGE UP (ref 22–29)
HCO3 BLDV-SCNC: 26 MMOL/L — SIGNIFICANT CHANGE UP (ref 22–29)
HCT VFR BLD CALC: 24.2 % — LOW (ref 39–50)
HCT VFR BLD CALC: 26.5 % — LOW (ref 39–50)
HCT VFR BLD CALC: 43.6 % — SIGNIFICANT CHANGE UP (ref 39–50)
HCT VFR BLDA CALC: 26 % — SIGNIFICANT CHANGE UP
HCT VFR BLDA CALC: 27 % — SIGNIFICANT CHANGE UP
HCT VFR BLDA CALC: 29 % — SIGNIFICANT CHANGE UP
HCT VFR BLDA CALC: 44 % — SIGNIFICANT CHANGE UP
HCT VFR BLDA CALC: 46 % — SIGNIFICANT CHANGE UP
HDLC SERPL-MCNC: 35 MG/DL — LOW
HGB BLD CALC-MCNC: 8.6 G/DL — LOW (ref 12.6–17.4)
HGB BLD CALC-MCNC: 9 G/DL — LOW (ref 12.6–17.4)
HGB BLD CALC-MCNC: 9 G/DL — LOW (ref 12.6–17.4)
HGB BLD-MCNC: 15.2 G/DL — SIGNIFICANT CHANGE UP (ref 13–17)
HGB BLD-MCNC: 8.8 G/DL — LOW (ref 13–17)
HGB BLD-MCNC: 9.3 G/DL — LOW (ref 13–17)
HGB BLDA-MCNC: 14.5 G/DL — SIGNIFICANT CHANGE UP (ref 12.6–17.4)
HGB BLDA-MCNC: 15.3 G/DL — SIGNIFICANT CHANGE UP (ref 12.6–17.4)
HGB BLDA-MCNC: 8.7 G/DL — LOW (ref 12.6–17.4)
HGB BLDA-MCNC: 8.8 G/DL — LOW (ref 12.6–17.4)
HGB BLDA-MCNC: 8.8 G/DL — LOW (ref 12.6–17.4)
HGB BLDA-MCNC: 8.9 G/DL — LOW (ref 12.6–17.4)
HGB BLDA-MCNC: 9 G/DL — LOW (ref 12.6–17.4)
HGB BLDA-MCNC: 9.8 G/DL — LOW (ref 12.6–17.4)
INR BLD: 1.32 RATIO — HIGH (ref 0.88–1.16)
LACTATE BLDA-MCNC: 1 MMOL/L — SIGNIFICANT CHANGE UP (ref 0.5–2)
LACTATE BLDA-MCNC: 1.2 MMOL/L — SIGNIFICANT CHANGE UP (ref 0.5–2)
LACTATE BLDA-MCNC: 1.7 MMOL/L — SIGNIFICANT CHANGE UP (ref 0.5–2)
LACTATE BLDA-MCNC: 1.9 MMOL/L — SIGNIFICANT CHANGE UP (ref 0.5–2)
LACTATE BLDA-MCNC: 2 MMOL/L — SIGNIFICANT CHANGE UP (ref 0.5–2)
LACTATE BLDA-MCNC: 2.4 MMOL/L — HIGH (ref 0.5–2)
LACTATE BLDA-MCNC: 2.5 MMOL/L — HIGH (ref 0.5–2)
LACTATE BLDA-MCNC: 2.9 MMOL/L — HIGH (ref 0.5–2)
LACTATE BLDV-MCNC: 1.2 MMOL/L — SIGNIFICANT CHANGE UP (ref 0.5–2)
LACTATE BLDV-MCNC: 2 MMOL/L — SIGNIFICANT CHANGE UP (ref 0.5–2)
LACTATE BLDV-MCNC: 2.7 MMOL/L — HIGH (ref 0.5–2)
LIPID PNL WITH DIRECT LDL SERPL: 117 MG/DL — HIGH
LYMPHOCYTES # BLD AUTO: 0.56 K/UL — LOW (ref 1–3.3)
LYMPHOCYTES # BLD AUTO: 2.6 % — LOW (ref 13–44)
MAGNESIUM SERPL-MCNC: 1.8 MG/DL — SIGNIFICANT CHANGE UP (ref 1.6–2.6)
MAGNESIUM SERPL-MCNC: 3.2 MG/DL — HIGH (ref 1.8–2.6)
MANUAL SMEAR VERIFICATION: SIGNIFICANT CHANGE UP
MCHC RBC-ENTMCNC: 28.7 PG — SIGNIFICANT CHANGE UP (ref 27–34)
MCHC RBC-ENTMCNC: 28.8 PG — SIGNIFICANT CHANGE UP (ref 27–34)
MCHC RBC-ENTMCNC: 29.8 PG — SIGNIFICANT CHANGE UP (ref 27–34)
MCHC RBC-ENTMCNC: 34.9 GM/DL — SIGNIFICANT CHANGE UP (ref 32–36)
MCHC RBC-ENTMCNC: 35.1 GM/DL — SIGNIFICANT CHANGE UP (ref 32–36)
MCHC RBC-ENTMCNC: 36.4 GM/DL — HIGH (ref 32–36)
MCV RBC AUTO: 81.8 FL — SIGNIFICANT CHANGE UP (ref 80–100)
MCV RBC AUTO: 82 FL — SIGNIFICANT CHANGE UP (ref 80–100)
MCV RBC AUTO: 82.6 FL — SIGNIFICANT CHANGE UP (ref 80–100)
METHGB MFR BLDA: 0 % — SIGNIFICANT CHANGE UP
METHGB MFR BLDA: 0.3 % — SIGNIFICANT CHANGE UP
METHGB MFR BLDA: 0.4 % — SIGNIFICANT CHANGE UP
METHGB MFR BLDA: 0.5 % — SIGNIFICANT CHANGE UP
METHGB MFR BLDA: 0.5 % — SIGNIFICANT CHANGE UP
METHGB MFR BLDV: 0.3 % — SIGNIFICANT CHANGE UP
METHGB MFR BLDV: 0.3 % — SIGNIFICANT CHANGE UP
MICROCYTES BLD QL: SLIGHT — SIGNIFICANT CHANGE UP
MONOCYTES # BLD AUTO: 1.12 K/UL — HIGH (ref 0–0.9)
MONOCYTES NFR BLD AUTO: 5.2 % — SIGNIFICANT CHANGE UP (ref 2–14)
NEUTROPHILS # BLD AUTO: 19.93 K/UL — HIGH (ref 1.8–7.4)
NEUTROPHILS NFR BLD AUTO: 92.2 % — HIGH (ref 43–77)
NON HDL CHOLESTEROL: 140 MG/DL — HIGH
OXYHGB MFR BLDA: 98 % — HIGH (ref 90–95)
PCO2 BLDA: 35 MMHG — SIGNIFICANT CHANGE UP (ref 35–48)
PCO2 BLDA: 36 MMHG — SIGNIFICANT CHANGE UP (ref 35–48)
PCO2 BLDA: 37 MMHG — SIGNIFICANT CHANGE UP (ref 35–48)
PCO2 BLDA: 38 MMHG — SIGNIFICANT CHANGE UP (ref 35–48)
PCO2 BLDA: 38 MMHG — SIGNIFICANT CHANGE UP (ref 35–48)
PCO2 BLDA: 39 MMHG — SIGNIFICANT CHANGE UP (ref 35–48)
PCO2 BLDA: 43 MMHG — SIGNIFICANT CHANGE UP (ref 35–48)
PCO2 BLDA: 43 MMHG — SIGNIFICANT CHANGE UP (ref 35–48)
PCO2 BLDV: 40 MMHG — LOW (ref 42–55)
PCO2 BLDV: 46 MMHG — SIGNIFICANT CHANGE UP (ref 42–55)
PCO2 BLDV: 47 MMHG — SIGNIFICANT CHANGE UP (ref 42–55)
PH BLDA: 7.35 — SIGNIFICANT CHANGE UP (ref 7.35–7.45)
PH BLDA: 7.37 — SIGNIFICANT CHANGE UP (ref 7.35–7.45)
PH BLDA: 7.38 — SIGNIFICANT CHANGE UP (ref 7.35–7.45)
PH BLDA: 7.4 — SIGNIFICANT CHANGE UP (ref 7.35–7.45)
PH BLDA: 7.42 — SIGNIFICANT CHANGE UP (ref 7.35–7.45)
PH BLDA: 7.43 — SIGNIFICANT CHANGE UP (ref 7.35–7.45)
PH BLDA: 7.43 — SIGNIFICANT CHANGE UP (ref 7.35–7.45)
PH BLDA: 7.44 — SIGNIFICANT CHANGE UP (ref 7.35–7.45)
PH BLDV: 7.34 — SIGNIFICANT CHANGE UP (ref 7.32–7.43)
PH BLDV: 7.34 — SIGNIFICANT CHANGE UP (ref 7.32–7.43)
PH BLDV: 7.35 — SIGNIFICANT CHANGE UP (ref 7.32–7.43)
PHOSPHATE SERPL-MCNC: 2.6 MG/DL — SIGNIFICANT CHANGE UP (ref 2.4–4.7)
PLAT MORPH BLD: NORMAL — SIGNIFICANT CHANGE UP
PLATELET # BLD AUTO: 185 K/UL — SIGNIFICANT CHANGE UP (ref 150–400)
PLATELET # BLD AUTO: 204 K/UL — SIGNIFICANT CHANGE UP (ref 150–400)
PLATELET # BLD AUTO: 226 K/UL — SIGNIFICANT CHANGE UP (ref 150–400)
PO2 BLDA: 472 MMHG — HIGH (ref 83–108)
PO2 BLDA: >496 MMHG — HIGH (ref 83–108)
PO2 BLDV: 47 MMHG — HIGH (ref 25–45)
PO2 BLDV: 49 MMHG — HIGH (ref 25–45)
PO2 BLDV: <42 MMHG — SIGNIFICANT CHANGE UP (ref 25–45)
POIKILOCYTOSIS BLD QL AUTO: SLIGHT — SIGNIFICANT CHANGE UP
POLYCHROMASIA BLD QL SMEAR: SLIGHT — SIGNIFICANT CHANGE UP
POTASSIUM BLDA-SCNC: 4 MMOL/L — SIGNIFICANT CHANGE UP (ref 3.5–5.1)
POTASSIUM BLDA-SCNC: 4.2 MMOL/L — SIGNIFICANT CHANGE UP (ref 3.5–5.1)
POTASSIUM BLDA-SCNC: 4.3 MMOL/L — SIGNIFICANT CHANGE UP (ref 3.5–5.1)
POTASSIUM BLDA-SCNC: 4.5 MMOL/L — SIGNIFICANT CHANGE UP (ref 3.5–5.1)
POTASSIUM BLDA-SCNC: 4.6 MMOL/L — SIGNIFICANT CHANGE UP (ref 3.5–5.1)
POTASSIUM BLDA-SCNC: 4.7 MMOL/L — SIGNIFICANT CHANGE UP (ref 3.5–5.1)
POTASSIUM BLDA-SCNC: 5.1 MMOL/L — SIGNIFICANT CHANGE UP (ref 3.5–5.1)
POTASSIUM BLDA-SCNC: 5.2 MMOL/L — HIGH (ref 3.5–5.1)
POTASSIUM BLDV-SCNC: 4.6 MMOL/L — SIGNIFICANT CHANGE UP (ref 3.5–5.1)
POTASSIUM BLDV-SCNC: 4.7 MMOL/L — SIGNIFICANT CHANGE UP (ref 3.5–5.1)
POTASSIUM BLDV-SCNC: 4.9 MMOL/L — SIGNIFICANT CHANGE UP (ref 3.5–5.1)
POTASSIUM SERPL-MCNC: 4 MMOL/L — SIGNIFICANT CHANGE UP (ref 3.5–5.3)
POTASSIUM SERPL-MCNC: 4.5 MMOL/L — SIGNIFICANT CHANGE UP (ref 3.5–5.3)
POTASSIUM SERPL-SCNC: 4 MMOL/L — SIGNIFICANT CHANGE UP (ref 3.5–5.3)
POTASSIUM SERPL-SCNC: 4.5 MMOL/L — SIGNIFICANT CHANGE UP (ref 3.5–5.3)
PROT SERPL-MCNC: 5 G/DL — LOW (ref 6.6–8.7)
PROTHROM AB SERPL-ACNC: 15.4 SEC — HIGH (ref 10.5–13.4)
RBC # BLD: 2.95 M/UL — LOW (ref 4.2–5.8)
RBC # BLD: 3.24 M/UL — LOW (ref 4.2–5.8)
RBC # BLD: 5.28 M/UL — SIGNIFICANT CHANGE UP (ref 4.2–5.8)
RBC # FLD: 12.4 % — SIGNIFICANT CHANGE UP (ref 10.3–14.5)
RBC # FLD: 13 % — SIGNIFICANT CHANGE UP (ref 10.3–14.5)
RBC # FLD: 13.5 % — SIGNIFICANT CHANGE UP (ref 10.3–14.5)
RBC BLD AUTO: SIGNIFICANT CHANGE UP
SAO2 % BLDA: 98.9 % — HIGH (ref 94–98)
SAO2 % BLDA: 99 % — HIGH (ref 94–98)
SAO2 % BLDA: 99 % — HIGH (ref 94–98)
SAO2 % BLDA: 99.2 % — HIGH (ref 94–98)
SAO2 % BLDA: 99.3 % — HIGH (ref 94–98)
SAO2 % BLDA: 99.3 % — HIGH (ref 94–98)
SAO2 % BLDV: 70.3 % — SIGNIFICANT CHANGE UP
SAO2 % BLDV: 84.2 % — SIGNIFICANT CHANGE UP (ref 67–88)
SAO2 % BLDV: 85 % — SIGNIFICANT CHANGE UP (ref 67–88)
SODIUM BLDA-SCNC: 133 MMOL/L — LOW (ref 136–145)
SODIUM BLDA-SCNC: 134 MMOL/L — LOW (ref 136–145)
SODIUM BLDA-SCNC: 134 MMOL/L — LOW (ref 136–145)
SODIUM BLDA-SCNC: 135 MMOL/L — LOW (ref 136–145)
SODIUM BLDA-SCNC: 136 MMOL/L — SIGNIFICANT CHANGE UP (ref 136–145)
SODIUM BLDA-SCNC: 137 MMOL/L — SIGNIFICANT CHANGE UP (ref 136–145)
SODIUM SERPL-SCNC: 137 MMOL/L — SIGNIFICANT CHANGE UP (ref 135–145)
SODIUM SERPL-SCNC: 140 MMOL/L — SIGNIFICANT CHANGE UP (ref 135–145)
TRIGL SERPL-MCNC: 114 MG/DL — SIGNIFICANT CHANGE UP
TROPONIN T SERPL-MCNC: 0.28 NG/ML — HIGH (ref 0–0.06)
WBC # BLD: 12.53 K/UL — HIGH (ref 3.8–10.5)
WBC # BLD: 15.02 K/UL — HIGH (ref 3.8–10.5)
WBC # BLD: 21.62 K/UL — HIGH (ref 3.8–10.5)
WBC # FLD AUTO: 12.53 K/UL — HIGH (ref 3.8–10.5)
WBC # FLD AUTO: 15.02 K/UL — HIGH (ref 3.8–10.5)
WBC # FLD AUTO: 21.62 K/UL — HIGH (ref 3.8–10.5)

## 2022-11-23 PROCEDURE — 33512 CABG VEIN THREE: CPT

## 2022-11-23 PROCEDURE — 99232 SBSQ HOSP IP/OBS MODERATE 35: CPT

## 2022-11-23 PROCEDURE — 33967 INSERT I-AORT PERCUT DEVICE: CPT

## 2022-11-23 PROCEDURE — 99233 SBSQ HOSP IP/OBS HIGH 50: CPT

## 2022-11-23 PROCEDURE — 33512 CABG VEIN THREE: CPT | Mod: AS

## 2022-11-23 PROCEDURE — 33508 ENDOSCOPIC VEIN HARVEST: CPT | Mod: 59

## 2022-11-23 PROCEDURE — 99152 MOD SED SAME PHYS/QHP 5/>YRS: CPT

## 2022-11-23 PROCEDURE — 93010 ELECTROCARDIOGRAM REPORT: CPT | Mod: 76

## 2022-11-23 PROCEDURE — 71045 X-RAY EXAM CHEST 1 VIEW: CPT | Mod: 26

## 2022-11-23 DEVICE — SURGICEL FIBRILLAR 4 X 4": Type: IMPLANTABLE DEVICE | Status: FUNCTIONAL

## 2022-11-23 DEVICE — CANNULA RETROGRADE CARDIOPLEGIA SELF-INFLATING 14FR PRE-SHAPED STYLET/HANDLE: Type: IMPLANTABLE DEVICE | Status: FUNCTIONAL

## 2022-11-23 DEVICE — FLOSEAL FAST PREP 10ML: Type: IMPLANTABLE DEVICE | Status: FUNCTIONAL

## 2022-11-23 DEVICE — CANNULA VENOUS 2 STAGE OPEN LIGHTHOUSE TIP 32-40FR X 1/2" NON-VENTED: Type: IMPLANTABLE DEVICE | Status: FUNCTIONAL

## 2022-11-23 DEVICE — BONE WAX 2.5GM: Type: IMPLANTABLE DEVICE | Status: FUNCTIONAL

## 2022-11-23 DEVICE — PACING WIRE BLUE DARK 2-0 24" BB-1 SKS-3: Type: IMPLANTABLE DEVICE | Status: FUNCTIONAL

## 2022-11-23 DEVICE — MEDIASTINAL CATH DRAIN 9MM: Type: IMPLANTABLE DEVICE | Status: FUNCTIONAL

## 2022-11-23 DEVICE — CANNULA ARTERIAL SOFT-FLOW 21FR EXTENDED VENTED: Type: IMPLANTABLE DEVICE | Status: FUNCTIONAL

## 2022-11-23 DEVICE — KIT A-LINE 1LUM 20G X 12CM SAFE KIT: Type: IMPLANTABLE DEVICE | Status: FUNCTIONAL

## 2022-11-23 DEVICE — CANNULA ATRASUMP 1/4" X 38CM: Type: IMPLANTABLE DEVICE | Status: FUNCTIONAL

## 2022-11-23 DEVICE — CANNULA AORTIC ROOT WITH VENT LINE 12G X 14CM FLANGED: Type: IMPLANTABLE DEVICE | Status: FUNCTIONAL

## 2022-11-23 DEVICE — SURGICEL NU-KNIT 6 X 9": Type: IMPLANTABLE DEVICE | Status: FUNCTIONAL

## 2022-11-23 DEVICE — CANNULA IMA 1MM BLUNT TIP: Type: IMPLANTABLE DEVICE | Status: FUNCTIONAL

## 2022-11-23 DEVICE — CANNULA VESSEL 3MM BLUNT TIP CLEAR 1-WAY VALVE: Type: IMPLANTABLE DEVICE | Status: FUNCTIONAL

## 2022-11-23 DEVICE — PACING WIRE ORANGE M-25 WINGED WIRE 37MM X 89MM: Type: IMPLANTABLE DEVICE | Status: FUNCTIONAL

## 2022-11-23 DEVICE — CHEST DRAIN PLEUR-EVAC 32FR STRAIGHT: Type: IMPLANTABLE DEVICE | Status: FUNCTIONAL

## 2022-11-23 DEVICE — PACING WIRE CLEAR 0 24" SC-2 CV-24: Type: IMPLANTABLE DEVICE | Status: FUNCTIONAL

## 2022-11-23 DEVICE — TISSEEL 10ML: Type: IMPLANTABLE DEVICE | Status: FUNCTIONAL

## 2022-11-23 DEVICE — KIT CVC 2LUM MAC 9FR CHG: Type: IMPLANTABLE DEVICE | Status: FUNCTIONAL

## 2022-11-23 DEVICE — LIGATING CLIPS AESCULAP SMALL WIDE 24: Type: IMPLANTABLE DEVICE | Status: FUNCTIONAL

## 2022-11-23 DEVICE — CATH THERMAL OXI SWAN GANZ DILUTION 7.5FR: Type: IMPLANTABLE DEVICE | Status: FUNCTIONAL

## 2022-11-23 RX ORDER — ATORVASTATIN CALCIUM 80 MG/1
1 TABLET, FILM COATED ORAL
Qty: 90 | Refills: 3
Start: 2022-11-23 | End: 2023-11-17

## 2022-11-23 RX ORDER — ATORVASTATIN CALCIUM 80 MG/1
80 TABLET, FILM COATED ORAL AT BEDTIME
Refills: 0 | Status: DISCONTINUED | OUTPATIENT
Start: 2022-11-24 | End: 2022-11-29

## 2022-11-23 RX ORDER — INSULIN HUMAN 100 [IU]/ML
2 INJECTION, SOLUTION SUBCUTANEOUS
Qty: 50 | Refills: 0 | Status: DISCONTINUED | OUTPATIENT
Start: 2022-11-23 | End: 2022-11-24

## 2022-11-23 RX ORDER — ASCORBIC ACID 60 MG
500 TABLET,CHEWABLE ORAL DAILY
Refills: 0 | Status: DISCONTINUED | OUTPATIENT
Start: 2022-11-23 | End: 2022-11-29

## 2022-11-23 RX ORDER — CHLORHEXIDINE GLUCONATE 213 G/1000ML
1 SOLUTION TOPICAL ONCE
Refills: 0 | Status: COMPLETED | OUTPATIENT
Start: 2022-11-23 | End: 2022-11-23

## 2022-11-23 RX ORDER — METFORMIN HYDROCHLORIDE 850 MG/1
1 TABLET ORAL
Qty: 0 | Refills: 0 | DISCHARGE

## 2022-11-23 RX ORDER — CHLORHEXIDINE GLUCONATE 213 G/1000ML
15 SOLUTION TOPICAL EVERY 12 HOURS
Refills: 0 | Status: DISCONTINUED | OUTPATIENT
Start: 2022-11-23 | End: 2022-11-23

## 2022-11-23 RX ORDER — POTASSIUM CHLORIDE 20 MEQ
10 PACKET (EA) ORAL
Refills: 0 | Status: COMPLETED | OUTPATIENT
Start: 2022-11-23 | End: 2022-11-23

## 2022-11-23 RX ORDER — POLYETHYLENE GLYCOL 3350 17 G/17G
17 POWDER, FOR SOLUTION ORAL DAILY
Refills: 0 | Status: DISCONTINUED | OUTPATIENT
Start: 2022-11-24 | End: 2022-11-29

## 2022-11-23 RX ORDER — FERROUS SULFATE 325(65) MG
325 TABLET ORAL DAILY
Refills: 0 | Status: DISCONTINUED | OUTPATIENT
Start: 2022-11-23 | End: 2022-11-29

## 2022-11-23 RX ORDER — ACETAMINOPHEN 500 MG
1000 TABLET ORAL ONCE
Refills: 0 | Status: COMPLETED | OUTPATIENT
Start: 2022-11-23 | End: 2022-11-23

## 2022-11-23 RX ORDER — ASPIRIN/CALCIUM CARB/MAGNESIUM 324 MG
1 TABLET ORAL
Qty: 90 | Refills: 3
Start: 2022-11-23 | End: 2023-11-17

## 2022-11-23 RX ORDER — ASPIRIN/CALCIUM CARB/MAGNESIUM 324 MG
81 TABLET ORAL DAILY
Refills: 0 | Status: DISCONTINUED | OUTPATIENT
Start: 2022-11-24 | End: 2022-11-29

## 2022-11-23 RX ORDER — SENNA PLUS 8.6 MG/1
2 TABLET ORAL AT BEDTIME
Refills: 0 | Status: DISCONTINUED | OUTPATIENT
Start: 2022-11-24 | End: 2022-11-29

## 2022-11-23 RX ORDER — OXYCODONE HYDROCHLORIDE 5 MG/1
5 TABLET ORAL EVERY 4 HOURS
Refills: 0 | Status: DISCONTINUED | OUTPATIENT
Start: 2022-11-23 | End: 2022-11-29

## 2022-11-23 RX ORDER — DEXTROSE 50 % IN WATER 50 %
50 SYRINGE (ML) INTRAVENOUS
Refills: 0 | Status: DISCONTINUED | OUTPATIENT
Start: 2022-11-23 | End: 2022-11-29

## 2022-11-23 RX ORDER — ALBUMIN HUMAN 25 %
100 VIAL (ML) INTRAVENOUS
Refills: 0 | Status: DISCONTINUED | OUTPATIENT
Start: 2022-11-23 | End: 2022-11-23

## 2022-11-23 RX ORDER — PANTOPRAZOLE SODIUM 20 MG/1
40 TABLET, DELAYED RELEASE ORAL
Refills: 0 | Status: DISCONTINUED | OUTPATIENT
Start: 2022-11-24 | End: 2022-11-29

## 2022-11-23 RX ORDER — FOLIC ACID 0.8 MG
1 TABLET ORAL DAILY
Refills: 0 | Status: DISCONTINUED | OUTPATIENT
Start: 2022-11-23 | End: 2022-11-29

## 2022-11-23 RX ORDER — DEXTROSE 50 % IN WATER 50 %
25 SYRINGE (ML) INTRAVENOUS
Refills: 0 | Status: DISCONTINUED | OUTPATIENT
Start: 2022-11-23 | End: 2022-11-29

## 2022-11-23 RX ORDER — CLOPIDOGREL BISULFATE 75 MG/1
1 TABLET, FILM COATED ORAL
Qty: 0 | Refills: 0 | DISCHARGE
Start: 2022-11-23

## 2022-11-23 RX ORDER — MAGNESIUM SULFATE 500 MG/ML
1 VIAL (ML) INJECTION ONCE
Refills: 0 | Status: DISCONTINUED | OUTPATIENT
Start: 2022-11-23 | End: 2022-11-23

## 2022-11-23 RX ORDER — CLOPIDOGREL BISULFATE 75 MG/1
1 TABLET, FILM COATED ORAL
Qty: 90 | Refills: 3
Start: 2022-11-23 | End: 2023-11-17

## 2022-11-23 RX ORDER — NICARDIPINE HYDROCHLORIDE 30 MG/1
5 CAPSULE, EXTENDED RELEASE ORAL
Qty: 40 | Refills: 0 | Status: DISCONTINUED | OUTPATIENT
Start: 2022-11-23 | End: 2022-11-24

## 2022-11-23 RX ORDER — SODIUM CHLORIDE 9 MG/ML
1000 INJECTION INTRAMUSCULAR; INTRAVENOUS; SUBCUTANEOUS
Refills: 0 | Status: DISCONTINUED | OUTPATIENT
Start: 2022-11-23 | End: 2022-11-25

## 2022-11-23 RX ORDER — CHLORHEXIDINE GLUCONATE 213 G/1000ML
15 SOLUTION TOPICAL ONCE
Refills: 0 | Status: COMPLETED | OUTPATIENT
Start: 2022-11-23 | End: 2022-11-23

## 2022-11-23 RX ORDER — ACETAMINOPHEN 500 MG
650 TABLET ORAL EVERY 6 HOURS
Refills: 0 | Status: DISCONTINUED | OUTPATIENT
Start: 2022-11-23 | End: 2022-11-29

## 2022-11-23 RX ORDER — VANCOMYCIN HCL 1 G
1250 VIAL (EA) INTRAVENOUS ONCE
Refills: 0 | Status: COMPLETED | OUTPATIENT
Start: 2022-11-23 | End: 2022-11-23

## 2022-11-23 RX ORDER — PANTOPRAZOLE SODIUM 20 MG/1
40 TABLET, DELAYED RELEASE ORAL DAILY
Refills: 0 | Status: DISCONTINUED | OUTPATIENT
Start: 2022-11-23 | End: 2022-11-23

## 2022-11-23 RX ORDER — POTASSIUM CHLORIDE 20 MEQ
10 PACKET (EA) ORAL
Refills: 0 | Status: DISCONTINUED | OUTPATIENT
Start: 2022-11-23 | End: 2022-11-25

## 2022-11-23 RX ORDER — SODIUM CHLORIDE 9 MG/ML
250 INJECTION INTRAMUSCULAR; INTRAVENOUS; SUBCUTANEOUS ONCE
Refills: 0 | Status: DISCONTINUED | OUTPATIENT
Start: 2022-11-23 | End: 2022-11-23

## 2022-11-23 RX ORDER — VANCOMYCIN HCL 1 G
1500 VIAL (EA) INTRAVENOUS EVERY 12 HOURS
Refills: 0 | Status: DISCONTINUED | OUTPATIENT
Start: 2022-11-23 | End: 2022-11-24

## 2022-11-23 RX ORDER — ATORVASTATIN CALCIUM 80 MG/1
1 TABLET, FILM COATED ORAL
Qty: 0 | Refills: 0 | DISCHARGE
Start: 2022-11-23

## 2022-11-23 RX ORDER — OXYCODONE HYDROCHLORIDE 5 MG/1
10 TABLET ORAL EVERY 4 HOURS
Refills: 0 | Status: DISCONTINUED | OUTPATIENT
Start: 2022-11-23 | End: 2022-11-29

## 2022-11-23 RX ORDER — SODIUM CHLORIDE 9 MG/ML
250 INJECTION INTRAMUSCULAR; INTRAVENOUS; SUBCUTANEOUS ONCE
Refills: 0 | Status: COMPLETED | OUTPATIENT
Start: 2022-11-23 | End: 2022-11-23

## 2022-11-23 RX ORDER — NOREPINEPHRINE BITARTRATE/D5W 8 MG/250ML
0.02 PLASTIC BAG, INJECTION (ML) INTRAVENOUS
Qty: 8 | Refills: 0 | Status: DISCONTINUED | OUTPATIENT
Start: 2022-11-23 | End: 2022-11-24

## 2022-11-23 RX ORDER — DESMOPRESSIN ACETATE 0.1 MG/1
27 TABLET ORAL ONCE
Refills: 0 | Status: DISCONTINUED | OUTPATIENT
Start: 2022-11-23 | End: 2022-11-23

## 2022-11-23 RX ORDER — ASPIRIN/CALCIUM CARB/MAGNESIUM 324 MG
1 TABLET ORAL
Qty: 0 | Refills: 0 | DISCHARGE
Start: 2022-11-23

## 2022-11-23 RX ORDER — CHLORHEXIDINE GLUCONATE 213 G/1000ML
1 SOLUTION TOPICAL DAILY
Refills: 0 | Status: DISCONTINUED | OUTPATIENT
Start: 2022-11-23 | End: 2022-11-25

## 2022-11-23 RX ORDER — CEFUROXIME AXETIL 250 MG
1500 TABLET ORAL ONCE
Refills: 0 | Status: COMPLETED | OUTPATIENT
Start: 2022-11-23 | End: 2022-11-23

## 2022-11-23 RX ORDER — DEXMEDETOMIDINE HYDROCHLORIDE IN 0.9% SODIUM CHLORIDE 4 UG/ML
0.2 INJECTION INTRAVENOUS
Qty: 200 | Refills: 0 | Status: DISCONTINUED | OUTPATIENT
Start: 2022-11-23 | End: 2022-11-24

## 2022-11-23 RX ORDER — CEFUROXIME AXETIL 250 MG
1500 TABLET ORAL EVERY 8 HOURS
Refills: 0 | Status: COMPLETED | OUTPATIENT
Start: 2022-11-23 | End: 2022-11-25

## 2022-11-23 RX ORDER — FENTANYL CITRATE 50 UG/ML
50 INJECTION INTRAVENOUS ONCE
Refills: 0 | Status: DISCONTINUED | OUTPATIENT
Start: 2022-11-23 | End: 2022-11-23

## 2022-11-23 RX ADMIN — PANTOPRAZOLE SODIUM 40 MILLIGRAM(S): 20 TABLET, DELAYED RELEASE ORAL at 18:13

## 2022-11-23 RX ADMIN — SODIUM CHLORIDE 5 MILLILITER(S): 9 INJECTION INTRAMUSCULAR; INTRAVENOUS; SUBCUTANEOUS at 18:13

## 2022-11-23 RX ADMIN — Medication 100 MILLIGRAM(S): at 20:27

## 2022-11-23 RX ADMIN — SODIUM CHLORIDE 250 MILLILITER(S): 9 INJECTION INTRAMUSCULAR; INTRAVENOUS; SUBCUTANEOUS at 09:35

## 2022-11-23 RX ADMIN — CHLORHEXIDINE GLUCONATE 1 APPLICATION(S): 213 SOLUTION TOPICAL at 18:14

## 2022-11-23 RX ADMIN — Medication 81 MILLIGRAM(S): at 09:34

## 2022-11-23 RX ADMIN — Medication 300 MILLIGRAM(S): at 23:21

## 2022-11-23 RX ADMIN — SODIUM CHLORIDE 10 MILLILITER(S): 9 INJECTION INTRAMUSCULAR; INTRAVENOUS; SUBCUTANEOUS at 18:12

## 2022-11-23 RX ADMIN — Medication 1000 MILLIGRAM(S): at 23:50

## 2022-11-23 RX ADMIN — INSULIN HUMAN 2 UNIT(S)/HR: 100 INJECTION, SOLUTION SUBCUTANEOUS at 18:12

## 2022-11-23 RX ADMIN — FENTANYL CITRATE 50 MICROGRAM(S): 50 INJECTION INTRAVENOUS at 18:09

## 2022-11-23 RX ADMIN — Medication 400 MILLIGRAM(S): at 23:20

## 2022-11-23 RX ADMIN — FENTANYL CITRATE 50 MICROGRAM(S): 50 INJECTION INTRAVENOUS at 18:24

## 2022-11-23 RX ADMIN — LISINOPRIL 20 MILLIGRAM(S): 2.5 TABLET ORAL at 05:58

## 2022-11-23 RX ADMIN — Medication 100 MILLIGRAM(S): at 05:58

## 2022-11-23 RX ADMIN — Medication 100 MILLIEQUIVALENT(S): at 18:12

## 2022-11-23 RX ADMIN — CHLORHEXIDINE GLUCONATE 15 MILLILITER(S): 213 SOLUTION TOPICAL at 18:14

## 2022-11-23 RX ADMIN — DEXMEDETOMIDINE HYDROCHLORIDE IN 0.9% SODIUM CHLORIDE 4.49 MICROGRAM(S)/KG/HR: 4 INJECTION INTRAVENOUS at 18:13

## 2022-11-23 RX ADMIN — CLOPIDOGREL BISULFATE 75 MILLIGRAM(S): 75 TABLET, FILM COATED ORAL at 09:34

## 2022-11-23 NOTE — DISCHARGE NOTE PROVIDER - NSDCACTIVITY_GEN_ALL_CORE
Do not drive or operate machinery/Stairs allowed/Walking - Indoors allowed/No heavy lifting/straining/Walking - Outdoors allowed Do not drive or operate machinery/Showering allowed/Do not make important decisions/Stairs allowed/Walking - Indoors allowed/No heavy lifting/straining/Walking - Outdoors allowed/Follow Instructions Provided by your Surgical Team

## 2022-11-23 NOTE — DISCHARGE NOTE PROVIDER - NSDCFUSCHEDAPPT_GEN_ALL_CORE_FT
Chacorta Hillman  [facility]  CTSURG [address]  Scheduled Appointment: 12/16/2022

## 2022-11-23 NOTE — BRIEF OPERATIVE NOTE - NSICDXBRIEFPROCEDURE_GEN_ALL_CORE_FT
PROCEDURES:  CABG, with saphenous vein graft 23-Nov-2022 16:11:44 SVG-LAD, SVG-OM, SVG-Diag Hallie Singh

## 2022-11-23 NOTE — DISCHARGE NOTE PROVIDER - NSDCMRMEDTOKEN_GEN_ALL_CORE_FT
aspirin 81 mg oral tablet, chewable: 1 tab(s) orally once a day  atorvastatin 80 mg oral tablet: 1 tab(s) orally once a day (at bedtime)  clopidogrel 75 mg oral tablet: 1 tab(s) orally once a day  lisinopril 20 mg oral tablet: 1 tab(s) orally 2 times a day  metFORMIN 1000 mg oral tablet: 1 tab(s) orally 2 times a day - RESTART on Friday 11/25/22   Metoprolol Tartrate 100 mg oral tablet: 1 tab(s) orally 2 times a day   acetaminophen 325 mg oral tablet: 2 tab(s) orally every 6 hours, As needed, for mild to moderate pain   amLODIPine 5 mg oral tablet: 1 tab(s) orally once a day  aspirin 81 mg oral tablet, chewable: 1 tab(s) orally once a day  atorvastatin 80 mg oral tablet: 1 tab(s) orally once a day (at bedtime)  Basaglar KwikPen 100 units/mL subcutaneous solution: 20 unit(s) subcutaneous once a day (at bedtime)  disp ONE month supply    clopidogrel 75 mg oral tablet: 1 tab(s) orally once a day  DME: BD pen needles  use as directed to inject insulin once a day  disp one month supply   DME: alcohol swabs  use as directed to clean skin before testing glucose or injecting insulin  disp one box  DME: lancets  use as directed to check sugar 4 times a day  disp one month supply   DME: test strips  use as directed to check sugar 4 times a day  disp one month supply   DME: glucometer  disp ONE  metFORMIN 500 mg oral tablet: 1 tab(s) orally 2 times a day   Metoprolol Tartrate 100 mg oral tablet: 1 tab(s) orally 2 times a day  oxyCODONE 5 mg oral tablet: 1 tab(s) orally every 6 hours, As Needed -Moderate Pain (4 - 6) - for severe pain MDD:4   senna leaf extract oral tablet: 2 tab(s) orally once a day (at bedtime), As Needed -for constipation

## 2022-11-23 NOTE — PROGRESS NOTE ADULT - PROBLEM SELECTOR PLAN 1
-CCTA score 30 for LAD, high grade stenosis in Ramus  -ECHO: 60-65%  -Trop neg  -S/p LHC with WENDY x 2 to LCX. pRamus with 95% (found to have thrombus, s/p integrelin gtt). For staged PCI today  -GDMT: lisinopril, metoprolol, ASA, plavix, atorvastatin  -Aggressive cardiovascular risk reduction and lifestyle modification.

## 2022-11-23 NOTE — PROGRESS NOTE ADULT - ASSESSMENT
48 y/o male with hx of HTN, DM2, current smoker who presents with chest pain. Pain is midsternal, nonradiating but associated with some b/l hand discomfort. 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 Pt received  mg in ED. He was found hypertensive 221/103 and was treated with Labetalol and Hydralazine IV. Troponin negative x1, proBNP normal. EKG shows SR with nonspecific T-wave abnormality. Pt does not have a cardiologist and denies any prior cardiac issues.

## 2022-11-23 NOTE — PROGRESS NOTE ADULT - ASSESSMENT
47 yr old male smoker, hypertension, diabetes mellitus here for mgmt of chest pain    CAD  - Telemetry monitoring  - LHC yesterday showing high grade stenosis in Ramus  - S/P WENDY x2 to LCX. pRamus 2w/ 95% (found to have thrombus s/p integrelin gtt). Staged PCI today  - TTE with normal LV  - Aspirin 81mg daily  - Plavix 5mg daily  - Lipitor 40mg nightly  - Lopressor 100mg BID  - f/u cardiology    Uncontrolled DM  - A1c 11  - Endo consult  - FS with ISS  - Will likely need insulin on DC    HTN  - Lisinopril 20mg daily  - Lopressor 100mg BID    Healthcare maintenance  DVT ppx - SCD  Dispo: DC pending post cath observation and home insulin dosing

## 2022-11-23 NOTE — DISCHARGE NOTE PROVIDER - PROVIDER TOKENS
PROVIDER:[TOKEN:[[id number]]] PROVIDER:[TOKEN:[[id number]]] PROVIDER:[TOKEN:[[id number]]],PROVIDER:[TOKEN:[[id number]]]

## 2022-11-23 NOTE — CONSULT NOTE ADULT - ASSESSMENT
47 yr old male smoker, hypertension, diabetes mellitus presented initially with complaints of chest pain that started on Saturday. States he had mid sternal chest pain about 3 episodes, on and off, pressure like, radiating to both arms. He was admitted to observation and had an abnormal cardiac CT and hence being admitted for LHC. Per chart, patient eats junk food, smokes heavily and works late hours.  S/p LHC 11/22 with WENDY x 2 to LCx.  Pt was planned for staged procedure today for pRamus 95%.  Patient now s/p LHC demonstrating dissection of the left main. IABP placed for support. CTS consulted for CABG eval.  Of note, pt was Plavix loaded x2 prior to consult.

## 2022-11-23 NOTE — PROGRESS NOTE ADULT - SUBJECTIVE AND OBJECTIVE BOX
[facility]                                                         CARDIOLOGY AT [facility]                                                                  [address] [address]                                                         Telephone: [phone]. Fax:[phone]                                                                             PROGRESS NOTE    Reason for follow up: CAD  Update: s/p integrelin gtt      Review of symptoms:   Cardiac:  No chest pain. No dyspnea. No palpitations.  Respiratory: no cough. No dyspnea  Gastrointestinal: No diarrhea. No abdominal pain. No bleeding.   Neuro: No focal neuro complaints.    Vitals:  T(C): 36.4 (11-23-22 @ 05:50), Max: 36.4 (11-23-22 @ 05:50)  HR: 52 (11-23-22 @ 08:00) (48 - 58)  BP: 150/87 (11-23-22 @ 08:00) (130/75 - 187/104)  RR: 16 (11-23-22 @ 08:00) (15 - 18)  SpO2: 98% (11-23-22 @ 08:00) (96% - 100%)  Wt(kg): --  I&O's Summary    Weight (kg): 89.8 (11-19 @ 19:32)    PHYSICAL EXAM:  Appearance: Comfortable. No acute distress  HEENT:  Atraumatic. Normocephalic.  Normal oral mucosa  Neurologic: A & O x 3, no gross focal deficits.  Cardiovascular: RRR S1 S2, No murmur, no rubs/gallops. No JVD  Respiratory: Lungs clear to auscultation, unlabored   Gastrointestinal:  Soft, Non-tender, + BS  Lower Extremities: 2+ Peripheral Pulses, No clubbing, cyanosis, or edema  Psychiatry: Patient is calm. No agitation.   Skin: warm and dry.    CURRENT CARDIAC MEDICATIONS:  lisinopril 20 milliGRAM(s) Oral two times a day  metoprolol tartrate 100 milliGRAM(s) Oral two times a day      CURRENT OTHER MEDICATIONS:  acetaminophen     Tablet .. 650 milliGRAM(s) Oral every 6 hours PRN Temp greater or equal to 38C (100.4F), Mild Pain (1 - 3)  melatonin 3 milliGRAM(s) Oral at bedtime PRN Insomnia  ondansetron Injectable 4 milliGRAM(s) IV Push every 8 hours PRN Nausea and/or Vomiting  aluminum hydroxide/magnesium hydroxide/simethicone Suspension 30 milliLiter(s) Oral every 4 hours PRN Dyspepsia  aluminum hydroxide/magnesium hydroxide/simethicone Suspension 30 milliLiter(s) Oral once, Stop order after: 1 Doses PRN Dyspepsia  atorvastatin 80 milliGRAM(s) Oral at bedtime  dextrose 50% Injectable 25 Gram(s) IV Push once, Stop order after: 1 Doses  dextrose 50% Injectable 12.5 Gram(s) IV Push once, Stop order after: 1 Doses  dextrose 50% Injectable 25 Gram(s) IV Push once, Stop order after: 1 Doses  dextrose Oral Gel 15 Gram(s) Oral once, Stop order after: 1 Doses PRN Blood Glucose LESS THAN 70 milliGRAM(s)/deciliter  glucagon  Injectable 1 milliGRAM(s) IntraMuscular once, Stop order after: 1 Doses  insulin lispro (ADMELOG) corrective regimen sliding scale   SubCutaneous three times a day before meals  aspirin  chewable 81 milliGRAM(s) Oral daily  clopidogrel Tablet 75 milliGRAM(s) Oral daily  dextrose 5%. 1000 milliLiter(s) (100 mL/Hr) IV Continuous <Continuous>  dextrose 5%. 1000 milliLiter(s) (50 mL/Hr) IV Continuous <Continuous>  magnesium sulfate  IVPB 1 Gram(s) IV Intermittent once, Stop order after: 1 Doses  sodium chloride 0.9% Bolus 250 milliLiter(s) IV Bolus once, Stop order after: 1 Doses  sodium chloride 0.9% Bolus 250 milliLiter(s) IV Bolus once, Stop order after: 1 Doses      LABS:	 	  ( 21 Nov 2022 08:04 )  Troponin T  0.01 ,  CPK  X    , CKMB  X    , BNP X        , ( 20 Nov 2022 04:07 )  Troponin T  <0.01,  CPK  X    , CKMB  X    , BNP X        , ( 19 Nov 2022 21:00 )  Troponin T  <0.01,  CPK  X    , CKMB  X    ,                                 15.2   12.53 )-----------( 185      ( 23 Nov 2022 06:00 )             43.6     11-23    137  |  104  |  18.2  ----------------------------<  166<H>  4.0   |  24.0  |  0.72    Ca    8.4      23 Nov 2022 06:00  Mg     1.8     11-23    TPro  7.0  /  Alb  3.5  /  TBili  0.5  /  DBili  x   /  AST  19  /  ALT  20  /  AlkPhos  98  11-22    PT/INR/PTT ( 22 Nov 2022 03:32 )                       :                       :      12.9         :       X                     .        .                   .              .           .       1.11        .                                       Lipid Profile: Date: 11-23 @ 06:00  Total cholesterol 175; Direct LDL: --; HDL: 35; Triglycerides:114    HgA1c:   TSH:     TELEMETRY: SR  ECG:  < from: 12 Lead ECG (11.19.22 @ 19:30) >  Normal sinus rhythm    < end of copied text >    DIAGNOSTIC TESTING:  [ ] Echocardiogram:   < from: TTE Echo Complete w/ Contrast w/ Doppler (11.20.22 @ 08:49) >  PHYSICIAN INTERPRETATION:  Left Ventricle: The left ventricular internal cavity size is normal.  Global LVsystolic function was normal. Left ventricular ejection   fraction, by visual estimation, is 60 to 65%.  Right Ventricle: Normal right ventricular size and function.  Left Atrium: The left atrium is normal in size.  Right Atrium: The right atrium is normal in size.  Pericardium: There is no evidence of pericardial effusion.  Mitral Valve: The mitral valve is normal in structure. Mitral leaflet   mobility is normal.  Tricuspid Valve: Trivial tricuspid regurgitation is visualized.  Aortic Valve: The aortic valve is trileaflet. No evidence of aortic valve   regurgitation is seen.  Pulmonic Valve: Trace pulmonic valve regurgitation.  Aorta: The aortic root is normal in size and structure.  Pulmonary Artery: The main pulmonary artery is normal in size.  Venous: The inferior vena cava was normal sized, with respiratory size   variation greater than 50%.  In comparison to the previous echocardiogram(s): There are no prior   studies on this patient for comparison purposes.    < end of copied text >

## 2022-11-23 NOTE — DISCHARGE NOTE PROVIDER - NSDCFUADDAPPT_GEN_ALL_CORE_FT
Please call and make an appointment with Dr. Morillo for post procedure, hospital follow up.  Please follow up with Dr. Hillman on ____  at [facility].     **Please arrive at [facility] ONE HOUR PRIOR TO APPOINTMENT TIME to get a CHEST XRAY (script in folder). Go directly to the Radiology Department.***    Cardiology:  Endocrine:   Primary care:    Please follow up with Dr. Hillman on Friday 12/16 @ 1PM    **Please arrive at [facility] ONE HOUR PRIOR TO APPOINTMENT TIME to get a CHEST XRAY (script in folder). Go directly to the Radiology Department.***  The cardiac surgery office is located at [facility], first floor. Take a left at the end of the lobby until the end of that george (past the elevator bank). Make a left and the office is on your right across from the elevators.     - follow up with cardiology Dr. Olivarez in 1-2 weeks. you can call [email or web address] Department at [phone] for financial assistance to lower your payments.   - Follow up at [facility] located at _____ on _____ for primary care. you will be referred for endocrinology   - follow up for Diabetes club on 12/11 at 2PM [address] [place], [place] [address]sathya Luxora   Please follow up with Dr. Hillman on Friday 12/16 @ 1PM    **Please arrive at [facility] ONE HOUR PRIOR TO APPOINTMENT TIME to get a CHEST XRAY (script in folder). Go directly to the Radiology Department.***  The cardiac surgery office is located at [facility], first floor. Take a left at the end of the lobby until the end of that george (past the elevator bank). Make a left and the office is on your right across from the elevators.     - follow up with cardiology Dr. Olivarez in 1-2 weeks. you can call [employer] Department at [phone] for financial assistance to lower your payments.   - Follow up at [facility] located at [address] on 12/13 at 10AM for primary care. you will be referred for endocrinology [phone]  - follow up for Diabetes club on 12/11 at 2PM [address] [place] [address]

## 2022-11-23 NOTE — PROGRESS NOTE ADULT - ASSESSMENT
Now s/p LHC via RRA with Dr. Romario Olivarez via RRA ->access site stable, no bleed/hematoma, distal pulse +2, RADIAL BAND ON. RUE REMAINS acyanotic. right hand warm to touch. finger tips warm to touch. palpable radial pulse. motor function/sensory function intact.   Intraprocedurally: Fentanyl 50mcg; Midazolam 1mg; Heparin 3000 units; Omnipaque 14cc; NS 200ml  Findings: Dissection from LAD propagating into LM; Patent previously placed Lcx stents, Distal Lcx disease (distal to stents), Ramus 95% stenosis (PRELIMINARY REPORT, PENDING OFFICIAL REPORT). RFA 8 Arabic sheath was placed with insertion of IABP. Placed on 1:1 mode. RFA site stable w/o active bleeding, no hematoma, no swelling. RLE remains warm to touch, palpable distal DP pulse. RLE remains warm and acyanotic. toes warm to touch. Patient denies active chest pain/ chest pressure, SOB, palpitations, abdominal complaints or nausea/ vomiting. D/W Dr. Romario Olivarez and CT surgery team -> no systemic heparin at this time because Patient to transfer to OR stat for emergent bypass.     Plan:  -Formal cath report pending  -Patient to transfer to OR for emergent bypass  -Admit to CTICU   -Post procedure management/monitoring per protocol  -Access site precautions  -Radial compression band removal at 1300 - CTSURGERY PA NOTIFIED TO REMOVE RADIAL BAND IF NO EVIDENCE OF BLEEDING OR HEMATOMA  -Continuous bedrest with HOB <30 degrees while IABP in place.   -NS 0.9% 250ml/hr x 1 bolus: post procedure GENE ppx   -Repeat ECG if any clinical indication or change on tele  -stat p2y12 level  -stat type and screen  -PRBC and platelets ordered on hold for OR  -neurovascular checks while IABP in place  -Cont statin therapy with Lipitor 80mg PO QHS  -Discussed the importance of RF modification  -DISPO: Transfer to OR, CTICU service

## 2022-11-23 NOTE — DISCHARGE NOTE PROVIDER - NSDCFUADDINST_GEN_ALL_CORE_FT
Restricted use with no heavy lifting of affected arm for 48 hours.  No submerging the arm in water for 48 hours.  You may start showering today.  Call your doctor for any bleeding, swelling, loss of sensation in the hand or fingers, or fingers turning blue.  If heavy bleeding or large lumps form, hold pressure at the spot and come to the Emergency Room.   Please call the Cardiothoracic Surgery office at [phone] if you are experiencing any shortness of breath, chest pain, fevers or chills, drainage from the incisions, persistent nausea, vomiting or if you have any questions about your medications. If the symptoms are severe, call 911 and go to the nearest hospital. You can also call [phone] for an emergency [facility] ambulance, which will take you to the closest [facility].    If you need any assistance for making any appointments for a new consult or referral in any specialty, please call our [facility] Clinical Coordination Center at [phone].

## 2022-11-23 NOTE — DISCHARGE NOTE PROVIDER - CARE PROVIDER_API CALL
Dilcia Morillo)  Cardiology; Cardiovascular Disease; Internal Medicine  [address] [place] [address]  Phone: [phone]  Fax: [phone]  Follow Up Time:    Chacorta Hillman; PATRICIA)  Surgery; Thoracic and Cardiac Surgery  [address] [place] [address]  Phone: [phone]  Fax: [phone]  Follow Up Time:    Chacorta Hillman; PATRICIA)  Surgery; Thoracic and Cardiac Surgery  [address] [place] [address]  Phone: [phone]  Fax: [phone]  Follow Up Time:     Romario Olivarez)  Cardiovascular Disease; Internal Medicine  [address] [place] [address]  Phone: [phone]  Fax: [phone]  Follow Up Time:

## 2022-11-23 NOTE — DISCHARGE NOTE PROVIDER - HOSPITAL COURSE
47 yr old male smoker, hypertension, diabetes mellitus presented initially with complaints of chest pain that started on Saturday. States he had mid sternal chest pain about 3 episodes, on and off, pressure like, radiating to both arms. Brother at bedside, reports patient was sweating at the time. He took some Tylenol without relief. He was admitted to observation and had an abnormal cardiac CT and hence being admitted for LHC.  He currently denies chest pain, no shortness of breath, leg swelling, bowel or bladder complaints. He admits to being non compliant with his antihypertensives and Metformin. Per brother at bedside, patient eats junk food, smokes heavily and works late hours.    Now s/p LHC via RRA with Dr. Romario Olivarez. RRA access site stable, no bleed/hematoma, distal pulse +2 .   Findings: Lcx 95% stenosis with thrombus noted s/p 2 WENDY (DEBBIE FRONTIER 3.5 x 18.0 MM) (DEBBIE FRONTIER 3.5 X 18MM)  Proximal Ramus Intermedius 95% stenosis.   Thrombus noted in proximal ramus intermedius. Intergrellin bolus IV x 2 given in lab. Integrellin gtt started in lab. (PRELIMINARY REPORT, PENDING OFFICIAL REPORT)  Post Cath EKG: Sinus Bradycardia reviewed by Dr. Romario Olivarez.   On cath lab table, s/p PCI pt c/o 6/10 midsternal chest pressure associated with left jaw pain and burning sensation. STAT EKG performed on cath lab table - reviewed by Dr. Olivarez Sinus bradycardia with T wave appears more peaked in V2-V5. 2mg ivp morphine, 40mg iv protonix, malalox x1 given. IV intregrellin gtt was initiated at 2mcg/kg/min. Patient now in cardiac cath lab holding area reporting improvement of chest pain - nearly resolved 1/10. repeat ECG performed in holding area and appears stable.     Plan:    -Continue current medical therapy  -Dual anti platelet therapy with aspirin/plavix   -Cont BB with Toprol 100mg po q 12   -Cont statin therapy with Lipitor 80mg po qHS   -Educated regarding strict adherence with DAPT   -Educated regarding post procedure management and care  -cardiac rehab info provided/referral and communication to cardiac rehab completed  -follow with Dr. Morillo in 1-2 weeks post procedure             *****************************************THIS DOCUMENT IS INCOMPLETE********************* 47 year old male every day smoker with a medical history of HTN, type 2 DM (HA1c 11.8 on Metformin), presented initially with complaints of intermittent midsternal chest pain 11/19/22, noted to have an abnormal cardiac CT, s/p LHC 11/22 with EWNDY x 2 to LCx,  and started on plavix with plan for staged PCI 11/23 for pRamus 95%, but during LHC found dissection of the left main. IABP placed 11/23 for support and patient underwent emergent CABG X 3 (SVG-LAD, SVG-OM, SVG-Diag) on 11/23/22 with Dr. Hillman. Postoperative course significant for volume resuscitation for hypovolemic shock, pressor requirement for hypotension (now weaned off and requiring Lopressor and Norvasc for HTN), and ABLA (s/p blood transfusion). Patient remains stable from respiratory and hemodynamic standpoint. Patient cleared for discharge home by Dr. Hillman. 47 year old male every day smoker with a medical history of HTN, type 2 DM (HA1c 11.8 on Metformin), presented initially with complaints of intermittent midsternal chest pain 11/19/22, noted to have an abnormal cardiac CT, s/p LHC 11/22 with WENDY x 2 to LCx,  and started on plavix with plan for staged PCI 11/23 for pRamus 95%, but during LHC found dissection of the left main. IABP placed 11/23 for support and patient underwent emergent CABG X 3 (SVG-LAD, SVG-OM, SVG-Diag) on 11/23/22 with Dr. Hillman. PERSAUD graft was not used because  it was noted to be calcified. Postoperative course significant for volume resuscitation for hypovolemic shock, pressor requirement for hypotension (now weaned off and requiring Lopressor and Norvasc for HTN), and Acute blood loss anemia (s/p blood transfusion). Patient remains stable from respiratory and hemodynamic standpoint. Patient cleared for discharge home by Dr. Hillman.     pt new to insulin on discharge. insulin and glucometer/fingerstick teaching performed. pt demonstrated understanding.

## 2022-11-23 NOTE — BRIEF OPERATIVE NOTE - NSICDXBRIEFPREOP_GEN_ALL_CORE_FT
PRE-OP DIAGNOSIS:  CAD (coronary artery disease) 23-Nov-2022 16:12:11 LAD from recent [facility] Hallie Singh  Coronary artery dissection 23-Nov-2022 16:13:05  Hallie Singh

## 2022-11-23 NOTE — DISCHARGE NOTE PROVIDER - NSDCCPTREATMENT_GEN_ALL_CORE_FT
PRINCIPAL PROCEDURE  Procedure: Left heart catheterization  Findings and Treatment: Now s/p LHC via RRA with Dr. Romario Olivarez. RRA access site stable, no bleed/hematoma, distal pulse +2 ,   Findings: Lcx 95% stenosis with thrombus noted s/p 2 WENDY (DEBBIE FRONTIER 3.5 x 18.0 MM) (DEBBIE FRONTIER 3.5 X 18MM)  Proximal Ramus Intermedius 95% stenosis. Thrombus noted in proximal ramus intermedius. Intergrellin bolus IV x 2 given in lab. Integrellin gtt started in lab. (PRELIMINARY REPORT, PENDING OFFICIAL REPORT)  Post Cath EKG: Sinus Bradycardia reviewed by Dr. Romario Olivarez.   On cath lab table, s/p PCI pt c/o 6/10 midsternal chest pressure associated with left jaw pain and burning sensation. STAT EKG performed on cath lab table - reviewed by Dr. Olivarez Sinus bradycardia with T wave appears more peaked in V2-V5. 2mg ivp morphine, 40mg iv protonix, malalox x1 given. IV intregrellin gtt was initiated at 2mcg/kg/min. Patient now in cardiac cath lab holding area reporting improvement of chest pain - nearly resolved 1/10. repeat ECG performed in holding area and appears stable.   Plan:  -Continue current medical therapy  -Dual anti platelet therapy with aspirin/plavix   -Cont BB with Toprol 100mg po q 12   -Cont statin therapy with Lipitor 80mg po qHS   -Educated regarding strict adherence with DAPT   -Educated regarding post procedure management and care  -cardiac rehab info provided/referral and communication to cardiac rehab completed  -follow with Dr. Morillo in 1-2 weeks post procedure          PRINCIPAL PROCEDURE  Procedure: CABG, with saphenous vein graft  Findings and Treatment: SVG-LAD, SVG-OM, SVG-Diag      SECONDARY PROCEDURE  Procedure: Left heart catheterization  Findings and Treatment:

## 2022-11-23 NOTE — BRIEF OPERATIVE NOTE - COMMENTS
No qualified resident was available to assist in this case. I have personally first assisted the Cardiothoracic Surgeon listed on this brief op note throughout the entirety of this case.

## 2022-11-23 NOTE — DISCHARGE NOTE PROVIDER - NSDCCPCAREPLAN_GEN_ALL_CORE_FT
PRINCIPAL DISCHARGE DIAGNOSIS  Diagnosis: Chest pain  Assessment and Plan of Treatment: 47 yr old male smoker, hypertension, diabetes mellitus presented initially with complaints of chest pain that started on Saturday. States he had mid sternal chest pain about 3 episodes, on and off, pressure like, radiating to both arms. Brother at bedside, reports patient was sweating at the time. He took some Tylenol without relief. He was admitted to observation and had an abnormal cardiac CT and hence being admitted for left heart catheterization to evaluate progression of coronary artery disease.         SECONDARY DISCHARGE DIAGNOSES  Diagnosis: Hypertension  Assessment and Plan of Treatment: Take medications for your blood pressure as recommended.  Eat a heart healthy diet that is low in saturated fats and salt, and includes whole grains, fruits, vegetables and lean protein   Exercise regularly (consult with your physician or cardiologist first); maintain a heart healthy weight.   If you smoke - quit (A resource to help you stop smoking is the [facility] for Tobacco [facility] – phone number [phone].). Continue to follow with your primary physician or cardiologist.   Seek medical help for dizziness, Lightheadedness, Blurry vision, Headache, Chest pain, Shortness of breath  Follow up with your medical doctor to establish long term blood pressure treatment goals.      Diagnosis: History of smoking  Assessment and Plan of Treatment: Most former smokers quit without using one of the treatments that scientific research has shown can work. However, the following treatments are proven to be effective for smokers who want help to quit:  •Brief help by a doctor (such as when a doctor takes 10 minutes or less to give a patient advice and assistance about quitting)  •Individual, group, or telephone counseling  •Behavioral therapies (such as training in problem solving)  •Treatments with more person-to-person contact and more intensity (such as more or longer counseling sessions)  •Programs to deliver treatments using mobile phones  Medications for quitting that have been found to be effective include the following:  •Nicotine replacement products       Over-the-counter (nicotine patch [which is also available by prescription], gum, lozenge)       Prescription (nicotine patch, inhaler, nasal spray)  •Prescription non-nicotine medications: bupropion SR (Zyban®), varenicline tartrate (Chantix®)       PRINCIPAL DISCHARGE DIAGNOSIS  Diagnosis: CAD (coronary artery disease)  Assessment and Plan of Treatment: You had coronary artery bypass grafting done on 11/23 with Dr. Hillman. Continue to take all of your medications as prescribed. Follow ALL instructions outlined in the CTS discharge booklet.      SECONDARY DISCHARGE DIAGNOSES  Diagnosis: Hypertension  Assessment and Plan of Treatment: You have hypertension, or high blood pressure. It is very important to continue your medication as ordered. High blood pressure increases your risk for heart attack, stroke and kidney disease. It does not usually cause symptoms but it can be serious.   1. Be sure to take all of your medication as prescribed.  2. You may find it helpful to get a home blood pressure meter and check your blood pressure periodically.  3. Lifestyle changes can improve your blood pressure and lessen the amount of medication you need, such as: losing weight, choosing a diet low in fat and rich in fruits, vegetables and low-fat dairy products, reducing the amount of salt you eat, cut down on alcohol (to less than two drinks per day) and do something active most days for 30 minutes or more.      Diagnosis: DM (diabetes mellitus)  Assessment and Plan of Treatment: It is extremely important to follow a diabetic (consistent carbohydrates, LOW/NO ADDED SUGAR) diet and monitor your glucose (sugar) levels. You have an increased risk of complications, including wound infections, due to the diabetes.  1. Check your glucoses 3-4 times daily before meals and bedtime.   2. Keep a log of your glucose levels every day.   3. Make an appointment to meet with your primary care provider or endocrinologist within a week.   4. Take ALL of your medications as prescribed. Only hold medications for low glucose levels (< 80) or if you are symptomatic (dizzy, sweating, lightheaded).  5. Ideally, we would like all of the glucose levels to be between .   You may have been discharged on different medications than you were taking before. Your body reacts differently to the medications after surgery. Please continue to take the medications as prescribed and notify the office, nurse practitioner or your PCP if you have any concerns right away.      Diagnosis: History of smoking  Assessment and Plan of Treatment: Smoking Cessation  Tobacco use is the most common preventable cause of death. About half of the people who don't quit smoking die of smoking-related problems. Quitting smoking is important for your health. Soon after you quit, your circulation begins to improve, and your blood pressure starts to return to normal. Your sense of smell and taste return, and it's easier for you to breathe. In the long term, giving up tobacco can help you live longer. Your risk of getting cancer decreases with each year you stay smoke-free.  Quitting is not easy. You may have short-term effects such as weight gain, irritability, and anxiety. Some people try several times before they succeed. There are many ways to quit smoking. Some people stop "cold turkey." Others benefit from step-by-step manuals, counseling, or medicines or products that help reduce nicotine addiction. The [facility] for Tobacco Control can help you find the best way for you to quit.  Resources:  [facility] for Tobacco Control at [phone] or visit [facility]/quitsmoking       PRINCIPAL DISCHARGE DIAGNOSIS  Diagnosis: CAD (coronary artery disease)  Assessment and Plan of Treatment: You had coronary artery bypass grafting done on 11/23 with Dr. Hillman. Continue to take all of your medications as prescribed. Follow ALL instructions outlined in the [facility] discharge booklet.      SECONDARY DISCHARGE DIAGNOSES  Diagnosis: Hypertension  Assessment and Plan of Treatment: You have hypertension, or high blood pressure. It is very important to continue your medication as ordered. High blood pressure increases your risk for heart attack, stroke and kidney disease. It does not usually cause symptoms but it can be serious.   1. Be sure to take all of your medication as prescribed.  2. You may find it helpful to get a home blood pressure meter and check your blood pressure periodically.  3. Lifestyle changes can improve your blood pressure and lessen the amount of medication you need, such as: losing weight, choosing a diet low in fat and rich in fruits, vegetables and low-fat dairy products, reducing the amount of salt you eat, cut down on alcohol (to less than two drinks per day) and do something active most days for 30 minutes or more.      Diagnosis: History of smoking  Assessment and Plan of Treatment: Smoking Cessation  Tobacco use is the most common preventable cause of death. About half of the people who don't quit smoking die of smoking-related problems. Quitting smoking is important for your health. Soon after you quit, your circulation begins to improve, and your blood pressure starts to return to normal. Your sense of smell and taste return, and it's easier for you to breathe. In the long term, giving up tobacco can help you live longer. Your risk of getting cancer decreases with each year you stay smoke-free.  Quitting is not easy. You may have short-term effects such as weight gain, irritability, and anxiety. Some people try several times before they succeed. There are many ways to quit smoking. Some people stop "cold turkey." Others benefit from step-by-step manuals, counseling, or medicines or products that help reduce nicotine addiction. The [facility] for Tobacco Control can help you find the best way for you to quit.  Resources:  [facility] for Tobacco Control at [phone] or visit [facility]/[email or web address]      Diagnosis: DM (diabetes mellitus)  Assessment and Plan of Treatment: It is extremely important to follow a diabetic (consistent carbohydrates, LOW/NO ADDED SUGAR) diet and monitor your glucose (sugar) levels. You have an increased risk of complications, including wound infections, due to the diabetes.  1. Check your glucoses 3-4 times daily before meals and bedtime.   2. Keep a log of your glucose levels every day.   3. Make an appointment to meet with your primary care provider or endocrinologist within a week.   4. Take ALL of your medications as prescribed. Only hold medications for low glucose levels (< 80) or if you are symptomatic (dizzy, sweating, lightheaded).  5. Ideally, we would like all of the glucose levels to be between .   You may have been discharged on different medications than you were taking before. Your body reacts differently to the medications after surgery. Please continue to take the medications as prescribed and notify the office, nurse practitioner or your PCP if you have any concerns right away.

## 2022-11-23 NOTE — CONSULT NOTE ADULT - PROBLEM SELECTOR RECOMMENDATION 9
-telemetry monitoring  -cardiac markers x3  -BP improved after receiving missed home antihypertensives and IV Labetalol  -TTE in am  -pt with multiple risk factors for CAD, will need outpatient ischemic evaluation  -smoking cessation
s/p cath with new LM dissection flap  Dr. Hillman at bedside  Plan for emergent CABG   PRBC, PLT, and Cryo on hold for OR  Above plan discussed with Dr. Hillman

## 2022-11-23 NOTE — PROGRESS NOTE ADULT - ASSESSMENT
48 y/o M with PMH of current smoker, HTN, DM, presented with chest pain. [facility] on 11/22, PCI planned for today 11/23.    1. Uncontrolled T2DM, a1c 11.8%  - Continue sliding scale coverage for now  - As diet advances, may need more insulin  - DC plan to be determined by how much he is requiring inpatient  - Will continue to follow    2. CAD  - S/p WENDY x2 to LCX  - PCI today    3. HTN  - Lisinopril, metoprolol

## 2022-11-23 NOTE — PROGRESS NOTE ADULT - SUBJECTIVE AND OBJECTIVE BOX
[facility] Division of Hospital Medicine    Chief complaint: chest pain    Patient seen and examined at bedside. No acute overnight events reported. No fever, chills, cough, nausea or vomiting. Patient denies any current chest pain. C yesterday, PCI today, likely needs AC, will DC tomorrow.     MEDICATIONS  (STANDING):  aspirin  chewable 81 milliGRAM(s) Oral daily  atorvastatin 80 milliGRAM(s) Oral at bedtime  clopidogrel Tablet 75 milliGRAM(s) Oral daily  dextrose 5%. 1000 milliLiter(s) (100 mL/Hr) IV Continuous <Continuous>  dextrose 5%. 1000 milliLiter(s) (50 mL/Hr) IV Continuous <Continuous>  dextrose 50% Injectable 25 Gram(s) IV Push once  dextrose 50% Injectable 12.5 Gram(s) IV Push once  dextrose 50% Injectable 25 Gram(s) IV Push once  glucagon  Injectable 1 milliGRAM(s) IntraMuscular once  insulin lispro (ADMELOG) corrective regimen sliding scale   SubCutaneous three times a day before meals  lisinopril 20 milliGRAM(s) Oral two times a day  magnesium sulfate  IVPB 1 Gram(s) IV Intermittent once  metoprolol tartrate 100 milliGRAM(s) Oral two times a day  nicotine - 21 mG/24Hr(s) Patch 1 Patch Transdermal daily  sodium chloride 0.9% Bolus 250 milliLiter(s) IV Bolus once  sodium chloride 0.9% Bolus 250 milliLiter(s) IV Bolus once    MEDICATIONS  (PRN):  acetaminophen     Tablet .. 650 milliGRAM(s) Oral every 6 hours PRN Temp greater or equal to 38C (100.4F), Mild Pain (1 - 3)  aluminum hydroxide/magnesium hydroxide/simethicone Suspension 30 milliLiter(s) Oral every 4 hours PRN Dyspepsia  aluminum hydroxide/magnesium hydroxide/simethicone Suspension 30 milliLiter(s) Oral once PRN Dyspepsia  dextrose Oral Gel 15 Gram(s) Oral once PRN Blood Glucose LESS THAN 70 milliGRAM(s)/deciliter  melatonin 3 milliGRAM(s) Oral at bedtime PRN Insomnia  ondansetron Injectable 4 milliGRAM(s) IV Push every 8 hours PRN Nausea and/or Vomiting        I&O's Summary      PHYSICAL EXAM:  Vital Signs Last 24 Hrs  T(C): 36.4 (2022 05:50), Max: 36.4 (2022 05:50)  T(F): 97.6 (2022 05:50), Max: 97.6 (2022 05:50)  HR: 52 (2022 08:00) (48 - 58)  BP: 150/87 (2022 08:00) (130/75 - 187/104)  BP(mean): --  RR: 16 (2022 08:00) (15 - 18)  SpO2: 98% (2022 08:00) (96% - 100%)    Parameters below as of 2022 08:00  Patient On (Oxygen Delivery Method): room air      Physical Exam:  Constitutional: alert and oriented, in no acute distress   Neck: Soft and supple  Respiratory: Clear to auscultation bilaterally, no wheezes or crackles  Cardiovascular: Regular rate and rhyhtm, no murmurs, gallops, rubs  Gastrointestinal: Soft, non-tender to palpation, +bs  Vascular: 2+ peripheral pulses  Neurological: A/O x 3  Musculoskeletal: 5/5 strength b/l upper and lower extremities, no lower extremity edema bilaterally    LABS:                        15.2   12.53 )-----------( 185      ( 2022 06:00 )             43.6     11-23    137  |  104  |  18.2  ----------------------------<  166<H>  4.0   |  24.0  |  0.72    Ca    8.4      2022 06:00  Mg     1.8     11-23    TPro  7.0  /  Alb  3.5  /  TBili  0.5  /  DBili  x   /  AST  19  /  ALT  20  /  AlkPhos  98  11-22    PT/INR - ( 2022 03:32 )   PT: 12.9 sec;   INR: 1.11 ratio               Urinalysis Basic - ( 2022 11:24 )    Color: Yellow / Appearance: Clear / S.010 / pH: x  Gluc: x / Ketone: Negative  / Bili: Negative / Urobili: Negative mg/dL   Blood: x / Protein: 30 mg/dL / Nitrite: Negative   Leuk Esterase: Negative / RBC: 0-2 /HPF / WBC 0-2 /HPF   Sq Epi: x / Non Sq Epi: Occasional / Bacteria: Occasional        CAPILLARY BLOOD GLUCOSE      POCT Blood Glucose.: 153 mg/dL (2022 21:25)  POCT Blood Glucose.: 128 mg/dL (2022 17:10)  POCT Blood Glucose.: 222 mg/dL (2022 13:37)        RADIOLOGY & ADDITIONAL TESTS:  Results Reviewed: y  Imaging Personally Reviewed: n  Electrocardiogram Personally Reviewed: reynold

## 2022-11-23 NOTE — CONSULT NOTE ADULT - SUBJECTIVE AND OBJECTIVE BOX
HPI: 47 yr old male smoker, hypertension, diabetes mellitus presented initially with complaints of chest pain that started on Saturday. States he had mid sternal chest pain about 3 episodes, on and off, pressure like, radiating to both arms. Brother at bedside, reports patient was sweating at the time. He took some Tylenol without relief. He was admitted to observation and had an abnormal cardiac CT and hence being admitted for C. Per brother at bedside, patient eats junk food, smokes heavily and works late hours.      Patient now s/p LHC demonstrating dissection of the left main. IABP placed for support. CTS consulted for CABG eval. Pt denies chest pain, palpitations, dizziness, headache, shortness of breath, abdominal pain or N/V/D/C.    Past Medical History  HTN (hypertension)    DM (diabetes mellitus)    Past Surgical History      MEDICATIONS  (STANDING):  atorvastatin 80 milliGRAM(s) Oral at bedtime  cefuroxime  IVPB 1500 milliGRAM(s) IV Intermittent once  chlorhexidine 0.12% Liquid 15 milliLiter(s) Swish and Spit once  chlorhexidine 4% Liquid 1 Application(s) Topical once  insulin lispro (ADMELOG) corrective regimen sliding scale   SubCutaneous three times a day before meals  magnesium sulfate  IVPB 1 Gram(s) IV Intermittent once  metoprolol tartrate 100 milliGRAM(s) Oral two times a day  nicotine - 21 mG/24Hr(s) Patch 1 Patch Transdermal daily  sodium chloride 0.9% Bolus 250 milliLiter(s) IV Bolus once  vancomycin  IVPB 1250 milliGRAM(s) IV Intermittent once    MEDICATIONS  (PRN):  acetaminophen     Tablet .. 650 milliGRAM(s) Oral every 6 hours PRN Temp greater or equal to 38C (100.4F), Mild Pain (1 - 3)  aluminum hydroxide/magnesium hydroxide/simethicone Suspension 30 milliLiter(s) Oral every 4 hours PRN Dyspepsia  aluminum hydroxide/magnesium hydroxide/simethicone Suspension 30 milliLiter(s) Oral once PRN Dyspepsia  dextrose Oral Gel 15 Gram(s) Oral once PRN Blood Glucose LESS THAN 70 milliGRAM(s)/deciliter  melatonin 3 milliGRAM(s) Oral at bedtime PRN Insomnia  ondansetron Injectable 4 milliGRAM(s) IV Push every 8 hours PRN Nausea and/or Vomiting    Antiplatelet therapy: Plavix load x2     Allergies: No Known Allergies    SOCIAL HISTORY:  Smoker:   ETOH use:   Ilicit Drug use:   Occupation:  Live with:  Assist device use:    Referring Cardiologist: no home cardiologist     Relevant Family History  FAMILY HISTORY:  FH: heart disease (Father)    Review of Systems  ROS negative x 10 systems unless otherwise stated in HPI.                 Telemetry: SB 50s    PHYSICAL EXAM  Vital Signs Last 24 Hrs  T(C): 36.4 (2022 05:50), Max: 36.4 (2022 05:50)  T(F): 97.6 (2022 05:50), Max: 97.6 (2022 05:50)  HR: 52 (2022 08:00) (48 - 58)  BP: 150/87 (2022 08:00) (130/75 - 187/104)  RR: 16 (2022 08:00) (15 - 18)  SpO2: 98% (2022 08:00) (96% - 100%)    Parameters below as of 2022 08:00  Patient On (Oxygen Delivery Method): room air    Constitutional: NAD  Neuro: A+O x 3, non-focal, speech clear and intact  CV: bradycardicm regular rhythm, +S1S2, no murmurs or rub  Pulm/chest: lung sounds CTA and equal bilaterally, no accessory muscle use noted  Abd: +BS, soft, NT, ND  Ext: BEACH x 4, no C/C/E, +palpable pedal pulses   Skin: warm, well perfused  Psych: calm, appropriate affect  Lines: Right femoral 8F IABP 1:1 Augmenting ___, c/d/i     LABS:                        15.2   12.53 )-----------( 185      ( 2022 06:00 )             43.6     11-23    137  |  104  |  18.2  ----------------------------<  166<H>  4.0   |  24.0  |  0.72    Ca    8.4      2022 06:00  Mg     1.8     11-23    TPro  7.0  /  Alb  3.5  /  TBili  0.5  /  DBili  x   /  AST  19  /  ALT  20  /  AlkPhos  98  11-22    PT/INR - ( 2022 03:32 )   PT: 12.9 sec;   INR: 1.11 ratio      Urinalysis Basic - ( 2022 11:24 )    Color: Yellow / Appearance: Clear / S.010 / pH: x  Gluc: x / Ketone: Negative  / Bili: Negative / Urobili: Negative mg/dL   Blood: x / Protein: 30 mg/dL / Nitrite: Negative   Leuk Esterase: Negative / RBC: 0-2 /HPF / WBC 0-2 /HPF   Sq Epi: x / Non Sq Epi: Occasional / Bacteria: Occasional    < from: Xray Chest 1 View-PORTABLE IMMEDIATE (22 @ 21:03) >    IMPRESSION:  No active parenchymal disease in the chest.    < end of copied text >    < from: CT Angio Cardiac w/ IV Cont (22 @ 09:53) >    LM (5): Patent.    LAD:  Prox (6): Heterogeneous plaque results in moderate narrowing.  Mid (7): Soft plaque results in mild narrowing.  Distal (8):Patent.    LCX:  Prox (11): Soft plaque results in moderate narrowing.  OM1(12): Patent.      Mid (13): Patent, terminates in a marginal branch.    RAMUS INTERMEDIUS: High-grade proximal focal stenosis.    < end of copied text >      < from: TTE Echo Complete w/ Contrast w/ Doppler (22 @ 08:49) >    Summary:   1. Leftventricular ejection fraction, by visual estimation, is 60 to   65%.   2. Normal global left ventricular systolic function.   3. Normal right ventricular size and function.   4. No significant valve disease.   5. There is no evidence of pericardial effusion.    < end of copied text >   HPI: 47 yr old male smoker, hypertension, diabetes mellitus presented initially with complaints of chest pain that started on Saturday. States he had mid sternal chest pain about 3 episodes, on and off, pressure like, radiating to both arms. He was admitted to observation and had an abnormal cardiac CT and hence being admitted for [facility]. Per chart, patient eats junk food, smokes heavily and works late hours.  S/p LHC  with WENDY x 2 to LCx.  Pt was planned for staged procedure today for pRamus 95%.  Patient now s/p LHC demonstrating dissection of the left main. IABP placed for support. CTS consulted for CABG eval. Pt denies chest pain, palpitations, dizziness, headache, shortness of breath, abdominal pain or N/V/D/C.    Of note, pt was Plavix loaded x2 prior to consult.        Past Medical History  HTN (hypertension)    DM (diabetes mellitus)    Past Surgical History  None    MEDICATIONS  (STANDING):  atorvastatin 80 milliGRAM(s) Oral at bedtime  cefuroxime  IVPB 1500 milliGRAM(s) IV Intermittent once  chlorhexidine 0.12% Liquid 15 milliLiter(s) Swish and Spit once  chlorhexidine 4% Liquid 1 Application(s) Topical once  insulin lispro (ADMELOG) corrective regimen sliding scale   SubCutaneous three times a day before meals  magnesium sulfate  IVPB 1 Gram(s) IV Intermittent once  metoprolol tartrate 100 milliGRAM(s) Oral two times a day  nicotine - 21 mG/24Hr(s) Patch 1 Patch Transdermal daily  sodium chloride 0.9% Bolus 250 milliLiter(s) IV Bolus once  vancomycin  IVPB 1250 milliGRAM(s) IV Intermittent once    MEDICATIONS  (PRN):  acetaminophen     Tablet .. 650 milliGRAM(s) Oral every 6 hours PRN Temp greater or equal to 38C (100.4F), Mild Pain (1 - 3)  aluminum hydroxide/magnesium hydroxide/simethicone Suspension 30 milliLiter(s) Oral every 4 hours PRN Dyspepsia  aluminum hydroxide/magnesium hydroxide/simethicone Suspension 30 milliLiter(s) Oral once PRN Dyspepsia  dextrose Oral Gel 15 Gram(s) Oral once PRN Blood Glucose LESS THAN 70 milliGRAM(s)/deciliter  melatonin 3 milliGRAM(s) Oral at bedtime PRN Insomnia  ondansetron Injectable 4 milliGRAM(s) IV Push every 8 hours PRN Nausea and/or Vomiting    Antiplatelet therapy: Plavix load x2     Allergies: No Known Allergies    SOCIAL HISTORY:  Smoker: per chart, pt smokes cigarettes daily  ETOH use: unknown  Ilicit Drug use: unknown      Referring Cardiologist: no home cardiologist     Relevant Family History  FAMILY HISTORY:  FH: heart disease (Father)    Review of Systems  ROS negative x 10 systems unless otherwise stated in HPI.                 Telemetry: SB 50s    PHYSICAL EXAM  Vital Signs Last 24 Hrs  T(C): 36.4 (2022 05:50), Max: 36.4 (2022 05:50)  T(F): 97.6 (2022 05:50), Max: 97.6 (2022 05:50)  HR: 52 (2022 08:00) (48 - 58)  BP: 150/87 (2022 08:00) (130/75 - 187/104)  RR: 16 (2022 08:00) (15 - 18)  SpO2: 98% (2022 08:00) (96% - 100%)    Parameters below as of 2022 08:00  Patient On (Oxygen Delivery Method): room air    Constitutional: NAD  Neuro: A+O x 3, non-focal, speech clear and intact  CV: bradycardic regular rhythm, +S1S2, no murmurs or rub  Pulm/chest: lung sounds CTA and equal bilaterally, no accessory muscle use noted  Abd: +BS, soft, NT, ND  Ext: BEACH x 4, no C/C/E, +palpable pedal pulses   Skin: warm, well perfused  Psych: calm, appropriate affect  Lines: Right femoral 8F IABP 1:1 Augmenting 170s, c/d/i     LABS:                        15.2   12.53 )-----------( 185      ( 2022 06:00 )             43.6     11-    137  |  104  |  18.2  ----------------------------<  166<H>  4.0   |  24.0  |  0.72    Ca    8.4      2022 06:00  Mg     1.8         TPro  7.0  /  Alb  3.5  /  TBili  0.5  /  DBili  x   /  AST  19  /  ALT  20  /  AlkPhos  98  11-22    PT/INR - ( 2022 03:32 )   PT: 12.9 sec;   INR: 1.11 ratio      Urinalysis Basic - ( 2022 11:24 )    Color: Yellow / Appearance: Clear / S.010 / pH: x  Gluc: x / Ketone: Negative  / Bili: Negative / Urobili: Negative mg/dL   Blood: x / Protein: 30 mg/dL / Nitrite: Negative   Leuk Esterase: Negative / RBC: 0-2 /HPF / WBC 0-2 /HPF   Sq Epi: x / Non Sq Epi: Occasional / Bacteria: Occasional    < from: Xray Chest 1 View-PORTABLE IMMEDIATE (22 @ 21:03) >    IMPRESSION:  No active parenchymal disease in the chest.    < end of copied text >    < from: CT Angio Cardiac w/ IV Cont (22 @ 09:53) >    LM (5): Patent.    LAD:  Prox (6): Heterogeneous plaque results in moderate narrowing.  Mid (7): Soft plaque results in mild narrowing.  Distal (8):Patent.    LCX:  Prox (11): Soft plaque results in moderate narrowing.  OM1(12): Patent.      Mid (13): Patent, terminates in a marginal branch.    RAMUS INTERMEDIUS: High-grade proximal focal stenosis.    < end of copied text >      < from: TTE Echo Complete w/ Contrast w/ Doppler (22 @ 08:49) >    Summary:   1. Leftventricular ejection fraction, by visual estimation, is 60 to   65%.   2. Normal global left ventricular systolic function.   3. Normal right ventricular size and function.   4. No significant valve disease.   5. There is no evidence of pericardial effusion.    < end of copied text >

## 2022-11-23 NOTE — PROGRESS NOTE ADULT - SUBJECTIVE AND OBJECTIVE BOX
INTERVAL EVENTS:  Follow up diabetes management  Seen at cath lab, PCI today    ROS: Patient denies chest pain, SOB, abd pain, N/V.    MEDICATIONS  (STANDING):  aspirin  chewable 81 milliGRAM(s) Oral daily  atorvastatin 80 milliGRAM(s) Oral at bedtime  clopidogrel Tablet 75 milliGRAM(s) Oral daily  dextrose 5%. 1000 milliLiter(s) (100 mL/Hr) IV Continuous <Continuous>  dextrose 5%. 1000 milliLiter(s) (50 mL/Hr) IV Continuous <Continuous>  dextrose 50% Injectable 25 Gram(s) IV Push once  dextrose 50% Injectable 12.5 Gram(s) IV Push once  dextrose 50% Injectable 25 Gram(s) IV Push once  glucagon  Injectable 1 milliGRAM(s) IntraMuscular once  insulin lispro (ADMELOG) corrective regimen sliding scale   SubCutaneous three times a day before meals  lisinopril 20 milliGRAM(s) Oral two times a day  magnesium sulfate  IVPB 1 Gram(s) IV Intermittent once  metoprolol tartrate 100 milliGRAM(s) Oral two times a day  nicotine - 21 mG/24Hr(s) Patch 1 Patch Transdermal daily  sodium chloride 0.9% Bolus 250 milliLiter(s) IV Bolus once  sodium chloride 0.9% Bolus 250 milliLiter(s) IV Bolus once    MEDICATIONS  (PRN):  acetaminophen     Tablet .. 650 milliGRAM(s) Oral every 6 hours PRN Temp greater or equal to 38C (100.4F), Mild Pain (1 - 3)  aluminum hydroxide/magnesium hydroxide/simethicone Suspension 30 milliLiter(s) Oral every 4 hours PRN Dyspepsia  aluminum hydroxide/magnesium hydroxide/simethicone Suspension 30 milliLiter(s) Oral once PRN Dyspepsia  dextrose Oral Gel 15 Gram(s) Oral once PRN Blood Glucose LESS THAN 70 milliGRAM(s)/deciliter  melatonin 3 milliGRAM(s) Oral at bedtime PRN Insomnia  ondansetron Injectable 4 milliGRAM(s) IV Push every 8 hours PRN Nausea and/or Vomiting    Allergies  No Known Allergies    Vital Signs Last 24 Hrs  T(C): 36.4 (2022 05:50), Max: 36.4 (2022 05:50)  T(F): 97.6 (2022 05:50), Max: 97.6 (2022 05:50)  HR: 52 (2022 08:00) (48 - 58)  BP: 150/87 (2022 08:00) (130/75 - 187/104)  BP(mean): --  RR: 16 (2022 08:00) (15 - 18)  SpO2: 98% (2022 08:00) (96% - 100%)    Parameters below as of 2022 08:00  Patient On (Oxygen Delivery Method): room air      PHYSICAL EXAM:  General: No apparent distress  Neck: Supple, trachea midline, no thyromegaly  Respiratory: Lungs clear bilaterally, normal rate, effort  Cardiac: +S1, S2, no m/r/g  GI: +BS, soft, non tender, non distended  Extremities: No peripheral edema, no pedal lesions  Neuro: A+O X3, no tremor  Pysch: Affect appropriate   Skin: No acanthosis       LABS:                        15.2   12.53 )-----------( 185      ( 2022 06:00 )             43.6         137  |  104  |  18.2  ----------------------------<  166<H>  4.0   |  24.0  |  0.72    Ca    8.4      2022 06:00  Mg     1.8         TPro  7.0  /  Alb  3.5  /  TBili  0.5  /  DBili  x   /  AST  19  /  ALT  20  /  AlkPhos  98      Urinalysis Basic - ( 2022 11:24 )    Color: Yellow / Appearance: Clear / S.010 / pH: x  Gluc: x / Ketone: Negative  / Bili: Negative / Urobili: Negative mg/dL   Blood: x / Protein: 30 mg/dL / Nitrite: Negative   Leuk Esterase: Negative / RBC: 0-2 /HPF / WBC 0-2 /HPF   Sq Epi: x / Non Sq Epi: Occasional / Bacteria: Occasional      POCT Blood Glucose.: 153 mg/dL (22 @ 21:25)  POCT Blood Glucose.: 128 mg/dL (22 @ 17:10)  POCT Blood Glucose.: 222 mg/dL (22 @ 13:37)

## 2022-11-23 NOTE — AIRWAY REMOVAL NOTE  ADULT & PEDS - ARTIFICAL AIRWAY REMOVAL COMMENTS
Written order for extubation verified.  The patient was identified by full name and birth date compared to the identification band.   Extubated without incident.

## 2022-11-23 NOTE — BRIEF OPERATIVE NOTE - NSICDXBRIEFPOSTOP_GEN_ALL_CORE_FT
POST-OP DIAGNOSIS:  CAD (coronary artery disease) 23-Nov-2022 16:13:24  Hallie Singh  Coronary artery dissection 23-Nov-2022 16:13:53 LAD from recent [facility] Hallie Singh

## 2022-11-23 NOTE — PROGRESS NOTE ADULT - SUBJECTIVE AND OBJECTIVE BOX
INTERVENTIONAL CARDIOLOGY NP NOTE       Chief complaint:    Patient is a 47y old  Male who presents with a chief complaint of chest pain (2022 06:50)      HPI:  47 yr old male smoker, hypertension, diabetes mellitus presented initially with complaints of chest pain that started on Saturday. States he had mid sternal chest pain about 3 episodes, on and off, pressure like, radiating to both arms. Brother at bedside, reports patient was sweating at the time. He took some Tylenol without relief. He was admitted to observation and had an abnormal cardiac CT and hence being admitted for [facility].  He currently denies chest pain, no shortness of breath, leg swelling, bowel or bladder complaints. He admits to being non compliant with his antihypertensives and Metformin. Per brother at bedside, patient eats junk food, smokes heavily and works late hours. (2022 18:52)      Anginal Class:        Angina (Class): II       Ischemic Symptoms: exertional chest pain/ GONSALVES    Heart Failure:        Systolic/Diastolic/Combined: n/a       NYHA Class (within 2 weeks):       PAST MEDICAL HISTORY  HTN (hypertension)    DM (diabetes mellitus)        Associated Risk Factors:        Frailty Assessment: (none/mild/mod/severe):       Cerebrovascular Disease: N/A       Chronic Lung Disease: N/A       Peripheral Arterial Disease: N/A       Chronic Kidney Disease (if yes, what is GFR): N/A       Uncontrolled Diabetes (if yes, what is HgbA1C or FBS): Yes A1C 11.8       Poorly Controlled Hypertension (if yes, what is SBP): Yes       Morbid Obesity (if yes, what is BMI): N/A       History of Recent Ventricular Arrhythmia: N/A       Inability to Ambulate Safely: N/A       Need for Therapeutic Anticoagulation: N/A       Antiplatelet or Contrast Allergy: N/A      PAST SURGICAL HISTORY      SOCIAL HISTORY:  ***    FAMILY HISTORY:  FH: heart disease (Father)      Family History of Premature Cardiovascular Disease:  Yes [  ] No [  ]    HOME MEDICATIONS:  lisinopril 20 mg oral tablet: 1 tab(s) orally 2 times a day (2022 20:33)  metFORMIN 1000 mg oral tablet: 1 tab(s) orally 2 times a day - RESTART on 22  (2022 07:04)  Metoprolol Tartrate 100 mg oral tablet: 1 tab(s) orally 2 times a day (2022 20:33)      CURRENT CARDIAC MEDICATIONS:  lisinopril 20 milliGRAM(s) Oral two times a day  metoprolol tartrate 100 milliGRAM(s) Oral two times a day      Antianginal Therapies:        Beta Blockers:  Metoprolol 100mg BID       Calcium Channel Blockers: n/a       Long Acting Nitrates: n/a       Ranexa: n/a    ALLERGIES:   No Known Allergies      REVIEW OF SYMPTOMS:   CONSTITUTIONAL: no fever, no chills, no weight loss, no weight gain, no fatigue   CARDIOVASCULAR: no active chest pain, chest pressure, SOB, GONSALVES, palpitations, burning sensation in chest, jaw pain, pain radiating down the arm  RESPIRATORY: no Shortness of breath, no cough, no wheezing  : No dysuria, no hematuria   GI: No dark color stool, no nausea, no diarrhea, no constipation, no abdominal pain, no nausea, no vomiting  NEURO: No headache, no slurred speech   ALL OTHER REVIEW OF SYSTEMS ARE NEGATIVE.    VITAL SIGNS:  T(C): 36.4 (22 @ 05:50), Max: 36.4 (22 @ 05:50)  T(F): 97.6 (22 @ 05:50), Max: 97.6 (22 @ 05:50)  HR: 52 (22 @ 08:00) (48 - 58)  BP: 150/87 (22 @ 08:00) (130/75 - 187/104)  RR: 16 (22 @ 08:00) (15 - 18)  SpO2: 98% (22 @ 08:00) (96% - 100%)    INTAKE AND OUTPUT:       PHYSICAL EXAM:  Constitutional: Comfortable . No acute distress.   HEENT: Atraumatic and normocephalic , neck is supple . no JVD. No carotid bruit.  CNS: A&Ox3. No focal deficits.   Respiratory: CTAB, unlabored   Cardiovascular: RRR normal s1 s2. No murmur. No rubs or gallop.  Gastrointestinal: Soft, non-tender. +Bowel sounds.   Extremities: 2+ Peripheral Pulses, No clubbing, cyanosis, or edema  Psychiatric: Calm . no agitation.   Skin: Warm and dry, no ulcers on extremities   POST PROCEDURE SITE: R RADIAL SITE c/d/i - mild ecchymosis noted to wrist. no active bleeding, no hematoma, no swelling. Right wrist nontender. remains acyanotic. hand warm/ finger tips warm. motor function/sensory function intact.     LABS:  ( 2022 08:04 )  Troponin T  0.01 ,  CPK  X    , CKMB  X    , BNP X        , ( 2022 04:07 )  Troponin T  <0.01,  CPK  X    , CKMB  X    , BNP X        , ( 2022 21:00 )  Troponin T  <0.01,  CPK  X    , CKMB  X    ,                                 15.2   12.53 )-----------( 185      ( 2022 06:00 )             43.6     11-    137  |  104  |  18.2  ----------------------------<  166<H>  4.0   |  24.0  |  0.72    Ca    8.4      2022 06:00  Mg     1.8         TPro  7.0  /  Alb  3.5  /  TBili  0.5  /  DBili  x   /  AST  19  /  ALT  20  /  AlkPhos  98  11-    PT/INR - ( 2022 03:32 )   PT: 12.9 sec;   INR: 1.11 ratio           Urinalysis Basic - ( 2022 11:24 )    Color: Yellow / Appearance: Clear / S.010 / pH: x  Gluc: x / Ketone: Negative  / Bili: Negative / Urobili: Negative mg/dL   Blood: x / Protein: 30 mg/dL / Nitrite: Negative   Leuk Esterase: Negative / RBC: 0-2 /HPF / WBC 0-2 /HPF   Sq Epi: x / Non Sq Epi: Occasional / Bacteria: Occasional      22 @ 06:00  CHolesterol: 175,  HDL: 35,  LDL: 117, Triglycerides: 114             ECG:   Prior ECG: Yes [X  ] No [  ]    CARDIAC TESTING   ECHO: < from: TTE Echo Complete w/ Contrast w/ Doppler (22 @ 08:49) >  Summary:   1. Leftventricular ejection fraction, by visual estimation, is 60 to   65%.   2. Normal global left ventricular systolic function.   3. Normal right ventricular size and function.   4. No significant valve disease.   5. There is no evidence of pericardial effusion.    < end of copied text >      Cardiac Interventions:  LHC: Findings: Lcx 95% stenosis with thrombus noted s/p 2 WENDY (DEBBIE FRONTIER 3.5 x 18.0 MM) (DEBBIE FRONTIER 3.5 X 18MM)  Proximal Ramus Intermedius 95% stenosis. Thrombus noted in proximal ramus intermedius. Intergrellin bolus IV x 2 given in lab. Integrellin gtt started in lab. Patient to have staging PCI of ramus        Cardiac Cath Risk Assessments:  ASA: 3  Mallampati: 2  Bleeding Risk: 0.6%  Creatinine: 0.72  GFR: 114      PLAN:   -Procedure and risks explained to patient  -Consent obtained by IC  -Labs and EKG reviewed  -aspirin 81mg and plavix 75mg given pre procedure  -IV hydration protocol ordered to prevent GENE  INTERVENTIONAL CARDIOLOGY NP NOTE       Chief complaint:    Patient is a 47y old  Male who presents with a chief complaint of chest pain (2022 06:50)      HPI:  47 yr old male smoker, hypertension, diabetes mellitus presented initially with complaints of chest pain that started on Saturday. States he had mid sternal chest pain about 3 episodes, on and off, pressure like, radiating to both arms. Brother at bedside, reports patient was sweating at the time. He took some Tylenol without relief. He was admitted to observation and had an abnormal cardiac CT and hence being admitted for [facility].  He currently denies chest pain, no shortness of breath, leg swelling, bowel or bladder complaints. He admits to being non compliant with his antihypertensives and Metformin. Per brother at bedside, patient eats junk food, smokes heavily and works late hours. (2022 18:52)      Anginal Class:        Angina (Class): II       Ischemic Symptoms: exertional chest pain/ GONSALVES    Heart Failure:        Systolic/Diastolic/Combined: n/a       NYHA Class (within 2 weeks):       PAST MEDICAL HISTORY  HTN (hypertension)    DM (diabetes mellitus)        Associated Risk Factors:        Frailty Assessment: (none/mild/mod/severe):       Cerebrovascular Disease: N/A       Chronic Lung Disease: N/A       Peripheral Arterial Disease: N/A       Chronic Kidney Disease (if yes, what is GFR): N/A       Uncontrolled Diabetes (if yes, what is HgbA1C or FBS): Yes A1C 11.8       Poorly Controlled Hypertension (if yes, what is SBP): Yes       Morbid Obesity (if yes, what is BMI): N/A       History of Recent Ventricular Arrhythmia: N/A       Inability to Ambulate Safely: N/A       Need for Therapeutic Anticoagulation: N/A       Antiplatelet or Contrast Allergy: N/A      PAST SURGICAL HISTORY      SOCIAL HISTORY:  ***    FAMILY HISTORY:  FH: heart disease (Father)      Family History of Premature Cardiovascular Disease:  Yes [  ] No [  ]    HOME MEDICATIONS:  lisinopril 20 mg oral tablet: 1 tab(s) orally 2 times a day (2022 20:33)  metFORMIN 1000 mg oral tablet: 1 tab(s) orally 2 times a day - RESTART on 22  (2022 07:04)  Metoprolol Tartrate 100 mg oral tablet: 1 tab(s) orally 2 times a day (2022 20:33)      CURRENT CARDIAC MEDICATIONS:  lisinopril 20 milliGRAM(s) Oral two times a day  metoprolol tartrate 100 milliGRAM(s) Oral two times a day      Antianginal Therapies:        Beta Blockers:  Metoprolol 100mg BID       Calcium Channel Blockers: n/a       Long Acting Nitrates: n/a       Ranexa: n/a    ALLERGIES:   No Known Allergies      REVIEW OF SYMPTOMS:   CONSTITUTIONAL: no fever, no chills, no weight loss, no weight gain, no fatigue   CARDIOVASCULAR: no active chest pain, chest pressure, SOB, GONSALVES, palpitations, burning sensation in chest, jaw pain, pain radiating down the arm  RESPIRATORY: no Shortness of breath, no cough, no wheezing  : No dysuria, no hematuria   GI: No dark color stool, no nausea, no diarrhea, no constipation, no abdominal pain, no nausea, no vomiting  NEURO: No headache, no slurred speech   ALL OTHER REVIEW OF SYSTEMS ARE NEGATIVE.    VITAL SIGNS:  T(C): 36.4 (22 @ 05:50), Max: 36.4 (22 @ 05:50)  T(F): 97.6 (22 @ 05:50), Max: 97.6 (22 @ 05:50)  HR: 52 (22 @ 08:00) (48 - 58)  BP: 150/87 (22 @ 08:00) (130/75 - 187/104)  RR: 16 (22 @ 08:00) (15 - 18)  SpO2: 98% (22 @ 08:00) (96% - 100%)    INTAKE AND OUTPUT:       PHYSICAL EXAM:  Constitutional: Comfortable . No acute distress.   HEENT: Atraumatic and normocephalic , neck is supple . no JVD. No carotid bruit.  CNS: A&Ox3. No focal deficits.   Respiratory: CTAB, unlabored   Cardiovascular: RRR normal s1 s2. No murmur. No rubs or gallop.  Gastrointestinal: Soft, non-tender. +Bowel sounds.   Extremities: 2+ Peripheral Pulses, No clubbing, cyanosis, or edema  Psychiatric: Calm . no agitation.   Skin: Warm and dry, no ulcers on extremities   POST PROCEDURE SITE: R RADIAL SITE c/d/i - mild ecchymosis noted to wrist. no active bleeding, no hematoma, no swelling. Right wrist nontender. remains acyanotic. hand warm/ finger tips warm. motor function/sensory function intact.     LABS:  ( 2022 08:04 )  Troponin T  0.01 ,  CPK  X    , CKMB  X    , BNP X        , ( 2022 04:07 )  Troponin T  <0.01,  CPK  X    , CKMB  X    , BNP X        , ( 2022 21:00 )  Troponin T  <0.01,  CPK  X    , CKMB  X    ,                                 15.2   12.53 )-----------( 185      ( 2022 06:00 )             43.6     11-    137  |  104  |  18.2  ----------------------------<  166<H>  4.0   |  24.0  |  0.72    Ca    8.4      2022 06:00  Mg     1.8         TPro  7.0  /  Alb  3.5  /  TBili  0.5  /  DBili  x   /  AST  19  /  ALT  20  /  AlkPhos  98  11-    PT/INR - ( 2022 03:32 )   PT: 12.9 sec;   INR: 1.11 ratio           Urinalysis Basic - ( 2022 11:24 )    Color: Yellow / Appearance: Clear / S.010 / pH: x  Gluc: x / Ketone: Negative  / Bili: Negative / Urobili: Negative mg/dL   Blood: x / Protein: 30 mg/dL / Nitrite: Negative   Leuk Esterase: Negative / RBC: 0-2 /HPF / WBC 0-2 /HPF   Sq Epi: x / Non Sq Epi: Occasional / Bacteria: Occasional      22 @ 06:00  CHolesterol: 175,  HDL: 35,  LDL: 117, Triglycerides: 114             ECG:   Prior ECG: Yes [X  ] No [  ]    CARDIAC TESTING   ECHO: < from: TTE Echo Complete w/ Contrast w/ Doppler (22 @ 08:49) >  Summary:   1. Leftventricular ejection fraction, by visual estimation, is 60 to   65%.   2. Normal global left ventricular systolic function.   3. Normal right ventricular size and function.   4. No significant valve disease.   5. There is no evidence of pericardial effusion.    < end of copied text >      Cardiac Interventions:  LHC: Findings: Lcx 95% stenosis with thrombus noted s/p 2 WENDY (DEBBIE FRONTIER 3.5 x 18.0 MM) (DEBBIE FRONTIER 3.5 X 18MM)  Proximal Ramus Intermedius 95% stenosis. Thrombus noted in proximal ramus intermedius. Intergrellin bolus IV x 2 given in lab. Integrellin gtt started in lab. Patient to have staging PCI of ramus        Cardiac Cath Risk Assessments:  ASA: 3  Mallampati: 2  Bleeding Risk: 0.6%  Creatinine: 0.72  GFR: 114      PLAN:   -Procedure and risks explained to patient  -Consent obtained by IC  -Labs and EKG reviewed  -aspirin 81mg and plavix 75mg given pre procedure  -IV hydration protocol ordered to prevent GENE  -PCI of ramus

## 2022-11-23 NOTE — DISCHARGE NOTE PROVIDER - CARE PROVIDERS DIRECT ADDRESSES
,[email or web address] ,DirectAddress_Unknown ,DirectAddress_Unknown,DirectAddress_Unknown

## 2022-11-24 LAB
ALBUMIN SERPL ELPH-MCNC: 3.7 G/DL — SIGNIFICANT CHANGE UP (ref 3.3–5.2)
ALP SERPL-CCNC: 51 U/L — SIGNIFICANT CHANGE UP (ref 40–120)
ALT FLD-CCNC: 14 U/L — SIGNIFICANT CHANGE UP
ANION GAP SERPL CALC-SCNC: 10 MMOL/L — SIGNIFICANT CHANGE UP (ref 5–17)
AST SERPL-CCNC: 32 U/L — SIGNIFICANT CHANGE UP
BASOPHILS # BLD AUTO: 0.01 K/UL — SIGNIFICANT CHANGE UP (ref 0–0.2)
BASOPHILS NFR BLD AUTO: 0.1 % — SIGNIFICANT CHANGE UP (ref 0–2)
BILIRUB SERPL-MCNC: 0.9 MG/DL — SIGNIFICANT CHANGE UP (ref 0.4–2)
BUN SERPL-MCNC: 15.7 MG/DL — SIGNIFICANT CHANGE UP (ref 8–20)
CALCIUM SERPL-MCNC: 8.1 MG/DL — LOW (ref 8.4–10.5)
CHLORIDE SERPL-SCNC: 107 MMOL/L — SIGNIFICANT CHANGE UP (ref 96–108)
CK MB CFR SERPL CALC: 13.9 NG/ML — HIGH (ref 0–6.7)
CK SERPL-CCNC: 305 U/L — HIGH (ref 30–200)
CO2 SERPL-SCNC: 23 MMOL/L — SIGNIFICANT CHANGE UP (ref 22–29)
CREAT SERPL-MCNC: 0.63 MG/DL — SIGNIFICANT CHANGE UP (ref 0.5–1.3)
EGFR: 119 ML/MIN/1.73M2 — SIGNIFICANT CHANGE UP
EOSINOPHIL # BLD AUTO: 0 K/UL — SIGNIFICANT CHANGE UP (ref 0–0.5)
EOSINOPHIL NFR BLD AUTO: 0 % — SIGNIFICANT CHANGE UP (ref 0–6)
GLUCOSE BLDC GLUCOMTR-MCNC: 102 MG/DL — HIGH (ref 70–99)
GLUCOSE BLDC GLUCOMTR-MCNC: 108 MG/DL — HIGH (ref 70–99)
GLUCOSE BLDC GLUCOMTR-MCNC: 109 MG/DL — HIGH (ref 70–99)
GLUCOSE BLDC GLUCOMTR-MCNC: 110 MG/DL — HIGH (ref 70–99)
GLUCOSE BLDC GLUCOMTR-MCNC: 115 MG/DL — HIGH (ref 70–99)
GLUCOSE BLDC GLUCOMTR-MCNC: 118 MG/DL — HIGH (ref 70–99)
GLUCOSE BLDC GLUCOMTR-MCNC: 119 MG/DL — HIGH (ref 70–99)
GLUCOSE BLDC GLUCOMTR-MCNC: 125 MG/DL — HIGH (ref 70–99)
GLUCOSE BLDC GLUCOMTR-MCNC: 126 MG/DL — HIGH (ref 70–99)
GLUCOSE BLDC GLUCOMTR-MCNC: 131 MG/DL — HIGH (ref 70–99)
GLUCOSE BLDC GLUCOMTR-MCNC: 134 MG/DL — HIGH (ref 70–99)
GLUCOSE BLDC GLUCOMTR-MCNC: 144 MG/DL — HIGH (ref 70–99)
GLUCOSE BLDC GLUCOMTR-MCNC: 147 MG/DL — HIGH (ref 70–99)
GLUCOSE BLDC GLUCOMTR-MCNC: 155 MG/DL — HIGH (ref 70–99)
GLUCOSE BLDC GLUCOMTR-MCNC: 156 MG/DL — HIGH (ref 70–99)
GLUCOSE BLDC GLUCOMTR-MCNC: 158 MG/DL — HIGH (ref 70–99)
GLUCOSE BLDC GLUCOMTR-MCNC: 164 MG/DL — HIGH (ref 70–99)
GLUCOSE BLDC GLUCOMTR-MCNC: 175 MG/DL — HIGH (ref 70–99)
GLUCOSE BLDC GLUCOMTR-MCNC: 186 MG/DL — HIGH (ref 70–99)
GLUCOSE BLDC GLUCOMTR-MCNC: 187 MG/DL — HIGH (ref 70–99)
GLUCOSE BLDC GLUCOMTR-MCNC: 193 MG/DL — HIGH (ref 70–99)
GLUCOSE SERPL-MCNC: 113 MG/DL — HIGH (ref 70–99)
HCT VFR BLD CALC: 26.4 % — LOW (ref 39–50)
HGB BLD-MCNC: 9 G/DL — LOW (ref 13–17)
IMM GRANULOCYTES NFR BLD AUTO: 0.6 % — SIGNIFICANT CHANGE UP (ref 0–0.9)
INR BLD: 1.37 RATIO — HIGH (ref 0.88–1.16)
LYMPHOCYTES # BLD AUTO: 1.02 K/UL — SIGNIFICANT CHANGE UP (ref 1–3.3)
LYMPHOCYTES # BLD AUTO: 5.8 % — LOW (ref 13–44)
MAGNESIUM SERPL-MCNC: 2.4 MG/DL — SIGNIFICANT CHANGE UP (ref 1.6–2.6)
MCHC RBC-ENTMCNC: 27.8 PG — SIGNIFICANT CHANGE UP (ref 27–34)
MCHC RBC-ENTMCNC: 34.1 GM/DL — SIGNIFICANT CHANGE UP (ref 32–36)
MCV RBC AUTO: 81.5 FL — SIGNIFICANT CHANGE UP (ref 80–100)
MONOCYTES # BLD AUTO: 0.77 K/UL — SIGNIFICANT CHANGE UP (ref 0–0.9)
MONOCYTES NFR BLD AUTO: 4.4 % — SIGNIFICANT CHANGE UP (ref 2–14)
NEUTROPHILS # BLD AUTO: 15.78 K/UL — HIGH (ref 1.8–7.4)
NEUTROPHILS NFR BLD AUTO: 89.1 % — HIGH (ref 43–77)
PLATELET # BLD AUTO: 208 K/UL — SIGNIFICANT CHANGE UP (ref 150–400)
POTASSIUM SERPL-MCNC: 4.2 MMOL/L — SIGNIFICANT CHANGE UP (ref 3.5–5.3)
POTASSIUM SERPL-SCNC: 4.2 MMOL/L — SIGNIFICANT CHANGE UP (ref 3.5–5.3)
PROT SERPL-MCNC: 5.5 G/DL — LOW (ref 6.6–8.7)
PROTHROM AB SERPL-ACNC: 16 SEC — HIGH (ref 10.5–13.4)
RBC # BLD: 3.24 M/UL — LOW (ref 4.2–5.8)
RBC # FLD: 13.5 % — SIGNIFICANT CHANGE UP (ref 10.3–14.5)
SODIUM SERPL-SCNC: 140 MMOL/L — SIGNIFICANT CHANGE UP (ref 135–145)
TROPONIN T SERPL-MCNC: 0.34 NG/ML — HIGH (ref 0–0.06)
WBC # BLD: 17.68 K/UL — HIGH (ref 3.8–10.5)
WBC # FLD AUTO: 17.68 K/UL — HIGH (ref 3.8–10.5)

## 2022-11-24 PROCEDURE — 33968 REMOVE AORTIC ASSIST DEVICE: CPT

## 2022-11-24 PROCEDURE — 99232 SBSQ HOSP IP/OBS MODERATE 35: CPT | Mod: 25

## 2022-11-24 PROCEDURE — 99024 POSTOP FOLLOW-UP VISIT: CPT

## 2022-11-24 PROCEDURE — 93010 ELECTROCARDIOGRAM REPORT: CPT

## 2022-11-24 PROCEDURE — 99233 SBSQ HOSP IP/OBS HIGH 50: CPT

## 2022-11-24 PROCEDURE — 71045 X-RAY EXAM CHEST 1 VIEW: CPT | Mod: 26

## 2022-11-24 RX ORDER — DEXTROSE 50 % IN WATER 50 %
15 SYRINGE (ML) INTRAVENOUS ONCE
Refills: 0 | Status: DISCONTINUED | OUTPATIENT
Start: 2022-11-24 | End: 2022-11-29

## 2022-11-24 RX ORDER — NICARDIPINE HYDROCHLORIDE 30 MG/1
5 CAPSULE, EXTENDED RELEASE ORAL
Qty: 40 | Refills: 0 | Status: DISCONTINUED | OUTPATIENT
Start: 2022-11-24 | End: 2022-11-25

## 2022-11-24 RX ORDER — HYDROMORPHONE HYDROCHLORIDE 2 MG/ML
0.25 INJECTION INTRAMUSCULAR; INTRAVENOUS; SUBCUTANEOUS ONCE
Refills: 0 | Status: DISCONTINUED | OUTPATIENT
Start: 2022-11-24 | End: 2022-11-24

## 2022-11-24 RX ORDER — INSULIN GLARGINE 100 [IU]/ML
12 INJECTION, SOLUTION SUBCUTANEOUS AT BEDTIME
Refills: 0 | Status: DISCONTINUED | OUTPATIENT
Start: 2022-11-24 | End: 2022-11-25

## 2022-11-24 RX ORDER — METOPROLOL TARTRATE 50 MG
50 TABLET ORAL
Refills: 0 | Status: DISCONTINUED | OUTPATIENT
Start: 2022-11-25 | End: 2022-11-26

## 2022-11-24 RX ORDER — LIDOCAINE 4 G/100G
1 CREAM TOPICAL DAILY
Refills: 0 | Status: DISCONTINUED | OUTPATIENT
Start: 2022-11-24 | End: 2022-11-29

## 2022-11-24 RX ORDER — METOPROLOL TARTRATE 50 MG
25 TABLET ORAL
Refills: 0 | Status: DISCONTINUED | OUTPATIENT
Start: 2022-11-24 | End: 2022-11-24

## 2022-11-24 RX ORDER — VANCOMYCIN HCL 1 G
1250 VIAL (EA) INTRAVENOUS EVERY 12 HOURS
Refills: 0 | Status: COMPLETED | OUTPATIENT
Start: 2022-11-24 | End: 2022-11-25

## 2022-11-24 RX ORDER — INSULIN LISPRO 100/ML
2 VIAL (ML) SUBCUTANEOUS
Refills: 0 | Status: DISCONTINUED | OUTPATIENT
Start: 2022-11-24 | End: 2022-11-25

## 2022-11-24 RX ORDER — GLUCAGON INJECTION, SOLUTION 0.5 MG/.1ML
1 INJECTION, SOLUTION SUBCUTANEOUS ONCE
Refills: 0 | Status: DISCONTINUED | OUTPATIENT
Start: 2022-11-24 | End: 2022-11-29

## 2022-11-24 RX ORDER — SODIUM CHLORIDE 9 MG/ML
1000 INJECTION, SOLUTION INTRAVENOUS
Refills: 0 | Status: DISCONTINUED | OUTPATIENT
Start: 2022-11-24 | End: 2022-11-29

## 2022-11-24 RX ORDER — POTASSIUM CHLORIDE 20 MEQ
10 PACKET (EA) ORAL
Refills: 0 | Status: COMPLETED | OUTPATIENT
Start: 2022-11-24 | End: 2022-11-24

## 2022-11-24 RX ORDER — HYDROMORPHONE HYDROCHLORIDE 2 MG/ML
0.5 INJECTION INTRAMUSCULAR; INTRAVENOUS; SUBCUTANEOUS ONCE
Refills: 0 | Status: DISCONTINUED | OUTPATIENT
Start: 2022-11-24 | End: 2022-11-24

## 2022-11-24 RX ORDER — GABAPENTIN 400 MG/1
300 CAPSULE ORAL THREE TIMES A DAY
Refills: 0 | Status: DISCONTINUED | OUTPATIENT
Start: 2022-11-24 | End: 2022-11-29

## 2022-11-24 RX ORDER — LABETALOL HCL 100 MG
20 TABLET ORAL ONCE
Refills: 0 | Status: COMPLETED | OUTPATIENT
Start: 2022-11-24 | End: 2022-11-24

## 2022-11-24 RX ORDER — INSULIN LISPRO 100/ML
VIAL (ML) SUBCUTANEOUS
Refills: 0 | Status: DISCONTINUED | OUTPATIENT
Start: 2022-11-24 | End: 2022-11-25

## 2022-11-24 RX ORDER — CLOPIDOGREL BISULFATE 75 MG/1
75 TABLET, FILM COATED ORAL DAILY
Refills: 0 | Status: DISCONTINUED | OUTPATIENT
Start: 2022-11-25 | End: 2022-11-29

## 2022-11-24 RX ADMIN — Medication 100 MILLIGRAM(S): at 11:38

## 2022-11-24 RX ADMIN — Medication 100 MILLIEQUIVALENT(S): at 04:41

## 2022-11-24 RX ADMIN — HYDROMORPHONE HYDROCHLORIDE 0.25 MILLIGRAM(S): 2 INJECTION INTRAMUSCULAR; INTRAVENOUS; SUBCUTANEOUS at 06:57

## 2022-11-24 RX ADMIN — OXYCODONE HYDROCHLORIDE 5 MILLIGRAM(S): 5 TABLET ORAL at 05:17

## 2022-11-24 RX ADMIN — Medication 25 MILLIGRAM(S): at 14:42

## 2022-11-24 RX ADMIN — HYDROMORPHONE HYDROCHLORIDE 0.5 MILLIGRAM(S): 2 INJECTION INTRAMUSCULAR; INTRAVENOUS; SUBCUTANEOUS at 08:05

## 2022-11-24 RX ADMIN — OXYCODONE HYDROCHLORIDE 5 MILLIGRAM(S): 5 TABLET ORAL at 04:46

## 2022-11-24 RX ADMIN — OXYCODONE HYDROCHLORIDE 10 MILLIGRAM(S): 5 TABLET ORAL at 23:10

## 2022-11-24 RX ADMIN — HYDROMORPHONE HYDROCHLORIDE 0.25 MILLIGRAM(S): 2 INJECTION INTRAMUSCULAR; INTRAVENOUS; SUBCUTANEOUS at 06:27

## 2022-11-24 RX ADMIN — Medication 650 MILLIGRAM(S): at 20:03

## 2022-11-24 RX ADMIN — SENNA PLUS 2 TABLET(S): 8.6 TABLET ORAL at 21:27

## 2022-11-24 RX ADMIN — Medication 166.67 MILLIGRAM(S): at 10:55

## 2022-11-24 RX ADMIN — HYDROMORPHONE HYDROCHLORIDE 0.5 MILLIGRAM(S): 2 INJECTION INTRAMUSCULAR; INTRAVENOUS; SUBCUTANEOUS at 11:23

## 2022-11-24 RX ADMIN — INSULIN GLARGINE 12 UNIT(S): 100 INJECTION, SOLUTION SUBCUTANEOUS at 21:27

## 2022-11-24 RX ADMIN — Medication 650 MILLIGRAM(S): at 05:17

## 2022-11-24 RX ADMIN — Medication 500 MILLIGRAM(S): at 12:01

## 2022-11-24 RX ADMIN — GABAPENTIN 300 MILLIGRAM(S): 400 CAPSULE ORAL at 21:27

## 2022-11-24 RX ADMIN — ATORVASTATIN CALCIUM 80 MILLIGRAM(S): 80 TABLET, FILM COATED ORAL at 21:27

## 2022-11-24 RX ADMIN — Medication 1 MILLIGRAM(S): at 12:01

## 2022-11-24 RX ADMIN — Medication 166.67 MILLIGRAM(S): at 23:11

## 2022-11-24 RX ADMIN — Medication 20 MILLIGRAM(S): at 18:21

## 2022-11-24 RX ADMIN — Medication 650 MILLIGRAM(S): at 13:10

## 2022-11-24 RX ADMIN — LIDOCAINE 1 PATCH: 4 CREAM TOPICAL at 12:02

## 2022-11-24 RX ADMIN — Medication 325 MILLIGRAM(S): at 12:01

## 2022-11-24 RX ADMIN — Medication 81 MILLIGRAM(S): at 12:02

## 2022-11-24 RX ADMIN — PANTOPRAZOLE SODIUM 40 MILLIGRAM(S): 20 TABLET, DELAYED RELEASE ORAL at 08:00

## 2022-11-24 RX ADMIN — OXYCODONE HYDROCHLORIDE 10 MILLIGRAM(S): 5 TABLET ORAL at 15:55

## 2022-11-24 RX ADMIN — Medication 100 MILLIGRAM(S): at 04:40

## 2022-11-24 RX ADMIN — Medication 100 MILLIEQUIVALENT(S): at 06:27

## 2022-11-24 RX ADMIN — Medication 2 UNIT(S): at 10:59

## 2022-11-24 RX ADMIN — Medication 650 MILLIGRAM(S): at 14:10

## 2022-11-24 RX ADMIN — OXYCODONE HYDROCHLORIDE 10 MILLIGRAM(S): 5 TABLET ORAL at 11:55

## 2022-11-24 RX ADMIN — OXYCODONE HYDROCHLORIDE 10 MILLIGRAM(S): 5 TABLET ORAL at 14:55

## 2022-11-24 RX ADMIN — OXYCODONE HYDROCHLORIDE 10 MILLIGRAM(S): 5 TABLET ORAL at 19:00

## 2022-11-24 RX ADMIN — CHLORHEXIDINE GLUCONATE 1 APPLICATION(S): 213 SOLUTION TOPICAL at 11:38

## 2022-11-24 RX ADMIN — LIDOCAINE 1 PATCH: 4 CREAM TOPICAL at 19:00

## 2022-11-24 RX ADMIN — Medication 2 UNIT(S): at 17:15

## 2022-11-24 RX ADMIN — OXYCODONE HYDROCHLORIDE 10 MILLIGRAM(S): 5 TABLET ORAL at 10:55

## 2022-11-24 RX ADMIN — Medication 650 MILLIGRAM(S): at 04:47

## 2022-11-24 RX ADMIN — HYDROMORPHONE HYDROCHLORIDE 0.5 MILLIGRAM(S): 2 INJECTION INTRAMUSCULAR; INTRAVENOUS; SUBCUTANEOUS at 08:20

## 2022-11-24 RX ADMIN — GABAPENTIN 300 MILLIGRAM(S): 400 CAPSULE ORAL at 13:10

## 2022-11-24 RX ADMIN — OXYCODONE HYDROCHLORIDE 10 MILLIGRAM(S): 5 TABLET ORAL at 23:40

## 2022-11-24 RX ADMIN — Medication 650 MILLIGRAM(S): at 20:33

## 2022-11-24 RX ADMIN — OXYCODONE HYDROCHLORIDE 10 MILLIGRAM(S): 5 TABLET ORAL at 19:30

## 2022-11-24 RX ADMIN — Medication 100 MILLIGRAM(S): at 20:04

## 2022-11-24 RX ADMIN — HYDROMORPHONE HYDROCHLORIDE 0.5 MILLIGRAM(S): 2 INJECTION INTRAMUSCULAR; INTRAVENOUS; SUBCUTANEOUS at 11:08

## 2022-11-24 NOTE — DIETITIAN INITIAL EVALUATION ADULT - PERTINENT MEDS FT
MEDICATIONS  (STANDING):  ascorbic acid 500 milliGRAM(s) Oral daily  aspirin enteric coated 81 milliGRAM(s) Oral daily  atorvastatin 80 milliGRAM(s) Oral at bedtime  cefuroxime  IVPB 1500 milliGRAM(s) IV Intermittent every 8 hours  chlorhexidine 2% Cloths 1 Application(s) Topical daily  dexMEDEtomidine Infusion 0.2 MICROgram(s)/kG/Hr (4.49 mL/Hr) IV Continuous <Continuous>  dextrose 50% Injectable 50 milliLiter(s) IV Push every 15 minutes  dextrose 50% Injectable 25 milliLiter(s) IV Push every 15 minutes  ferrous    sulfate 325 milliGRAM(s) Oral daily  folic acid 1 milliGRAM(s) Oral daily  insulin regular Infusion 2 Unit(s)/Hr (2 mL/Hr) IV Continuous <Continuous>  niCARdipine Infusion 5 mG/Hr (25 mL/Hr) IV Continuous <Continuous>  norepinephrine Infusion 0.02 MICROgram(s)/kG/Min (3.37 mL/Hr) IV Continuous <Continuous>  pantoprazole    Tablet 40 milliGRAM(s) Oral before breakfast  polyethylene glycol 3350 17 Gram(s) Oral daily  potassium chloride  10 mEq/50 mL IVPB 10 milliEquivalent(s) IV Intermittent every 1 hour  potassium chloride  10 mEq/50 mL IVPB 10 milliEquivalent(s) IV Intermittent every 1 hour  potassium chloride  10 mEq/50 mL IVPB 10 milliEquivalent(s) IV Intermittent every 1 hour  senna 2 Tablet(s) Oral at bedtime  sodium chloride 0.9%. 1000 milliLiter(s) (10 mL/Hr) IV Continuous <Continuous>  sodium chloride 0.9%. 1000 milliLiter(s) (5 mL/Hr) IV Continuous <Continuous>  vancomycin  IVPB 1500 milliGRAM(s) IV Intermittent every 12 hours    MEDICATIONS  (PRN):  acetaminophen     Tablet .. 650 milliGRAM(s) Oral every 6 hours PRN Mild Pain (1 - 3)  oxyCODONE    IR 5 milliGRAM(s) Oral every 4 hours PRN Moderate Pain (4 - 6)  oxyCODONE    IR 10 milliGRAM(s) Oral every 4 hours PRN Severe Pain (7 - 10)

## 2022-11-24 NOTE — DIETITIAN INITIAL EVALUATION ADULT - ORAL INTAKE PTA/DIET HISTORY
Pt sleeping and was asked by nursing not to wake him, just received pain meds. Now on clears as of this morning.  A1C noted 11.8

## 2022-11-24 NOTE — PROGRESS NOTE ADULT - PROBLEM SELECTOR PLAN 1
-CCTA score 30 for LAD, high grade stenosis in Ramus  -ECHO: 60-65%  -Trop neg  -S/p LHC with WENDY x 2 to LCX. pRamus with 95% (found to have thrombus, s/p integrelin gtt). For staged PCI. Course complicated by LM dissection. Emergent CABG 3v. Weaned of pressors  -GDMT: lisinopril (held for surgery), metoprolol (held for surgery), ASA, plavix (held for surgery, would resume), atorvastatin  -Aggressive cardiovascular risk reduction and lifestyle modification.

## 2022-11-24 NOTE — PROGRESS NOTE ADULT - SUBJECTIVE AND OBJECTIVE BOX
Subjective: no c/o incisional pain at this time. Denies CP, SOB, palpitations, N/V, other c/o.    all other ROS negative x10 except as noted above     T(C): 37 (11-24-22 @ 00:00), Max: 37 (11-23-22 @ 23:00)  HR: 62 (11-24-22 @ 01:45) (52 - 75)  BP: 150/87 (11-23-22 @ 08:00) (150/87 - 162/90)  ABP: 103/59 (11-24-22 @ 01:45) (90/55 - 168/84)  ABP(mean): 78 (11-24-22 @ 01:45) (72 - 119)  RR: 16 (11-24-22 @ 01:45) (10 - 19)  SpO2: 99% (11-24-22 @ 01:45) (96% - 100%)  Wt(kg): --  CVP(mm Hg): 15 (11-24-22 @ 01:45) (11 - 20)  CO: 5.7 (11-24-22 @ 01:00) (4.2 - 6.3)  CI: 2.8 (11-24-22 @ 01:00) (2 - 2.9)  PA: 29/14 (11-24-22 @ 01:45) (26/10 - 37/20)   Mode: CPAP with PS  FiO2: 40  PEEP: 5  PS: 5      I&O's Detail    23 Nov 2022 07:01  -  24 Nov 2022 01:54  --------------------------------------------------------  IN:    Dexmedetomidine: 62.9 mL    Insulin: 19.5 mL    IV PiggyBack: 50 mL    IV PiggyBack: 500 mL    IV PiggyBack: 100 mL    NiCARdipine: 12.5 mL    Norepinephrine: 7 mL    sodium chloride 0.9%: 50 mL    sodium chloride 0.9%: 100 mL  Total IN: 901.9 mL    OUT:    Chest Tube (mL): 270 mL    Chest Tube (mL): 180 mL    Chest Tube (mL): 100 mL    Indwelling Catheter - Urethral (mL): 1570 mL  Total OUT: 2120 mL    Total NET: -1218.1 mL          LABS: All Lab data reviewed and analyzed                        9.3    15.02 )-----------( 204      ( 23 Nov 2022 22:43 )             26.5     11-23    140  |  106  |  14.4  ----------------------------<  166<H>  4.5   |  24.0  |  0.66    Ca    9.0      23 Nov 2022 17:30  Phos  2.6     11-23  Mg     3.2     11-23    TPro  5.0<L>  /  Alb  3.4  /  TBili  1.2  /  DBili  x   /  AST  26  /  ALT  13  /  AlkPhos  51  11-23    PT/INR - ( 23 Nov 2022 17:30 )   PT: 15.4 sec;   INR: 1.32 ratio         PTT - ( 23 Nov 2022 17:30 )  PTT:26.7 sec  CARDIAC MARKERS ( 23 Nov 2022 17:30 )   U/L / CKMB15.9 ng/mL / Troponin T0.28 ng/mL /      ABG - ( 23 Nov 2022 22:41 )  pH, Arterial: 7.410 pH, Blood: x     /  pCO2: 38    /  pO2: 144   / HCO3: 24    / Base Excess: -0.5  /  SaO2: 98.8              CAPILLARY BLOOD GLUCOSE      POCT Blood Glucose.: 125 mg/dL (24 Nov 2022 01:13)  POCT Blood Glucose.: 144 mg/dL (23 Nov 2022 23:58)  POCT Blood Glucose.: 142 mg/dL (23 Nov 2022 22:51)  POCT Blood Glucose.: 159 mg/dL (23 Nov 2022 21:51)  POCT Blood Glucose.: 158 mg/dL (23 Nov 2022 20:51)  POCT Blood Glucose.: 171 mg/dL (23 Nov 2022 20:10)  POCT Blood Glucose.: 183 mg/dL (23 Nov 2022 18:50)           RADIOLOGY: - Reviewed and analyzed   CXR: pending    HOSPITAL MEDICATIONS: All medications reviewed and analyzed  MEDICATIONS  (STANDING):  ascorbic acid 500 milliGRAM(s) Oral daily  aspirin enteric coated 81 milliGRAM(s) Oral daily  atorvastatin 80 milliGRAM(s) Oral at bedtime  cefuroxime  IVPB 1500 milliGRAM(s) IV Intermittent every 8 hours  chlorhexidine 2% Cloths 1 Application(s) Topical daily  dexMEDEtomidine Infusion 0.2 MICROgram(s)/kG/Hr (4.49 mL/Hr) IV Continuous <Continuous>  dextrose 50% Injectable 50 milliLiter(s) IV Push every 15 minutes  dextrose 50% Injectable 25 milliLiter(s) IV Push every 15 minutes  ferrous    sulfate 325 milliGRAM(s) Oral daily  folic acid 1 milliGRAM(s) Oral daily  insulin regular Infusion 2 Unit(s)/Hr (2 mL/Hr) IV Continuous <Continuous>  niCARdipine Infusion 5 mG/Hr (25 mL/Hr) IV Continuous <Continuous>  norepinephrine Infusion 0.02 MICROgram(s)/kG/Min (3.37 mL/Hr) IV Continuous <Continuous>  pantoprazole    Tablet 40 milliGRAM(s) Oral before breakfast  polyethylene glycol 3350 17 Gram(s) Oral daily  potassium chloride  10 mEq/50 mL IVPB 10 milliEquivalent(s) IV Intermittent every 1 hour  potassium chloride  10 mEq/50 mL IVPB 10 milliEquivalent(s) IV Intermittent every 1 hour  potassium chloride  10 mEq/50 mL IVPB 10 milliEquivalent(s) IV Intermittent every 1 hour  senna 2 Tablet(s) Oral at bedtime  sodium chloride 0.9%. 1000 milliLiter(s) (10 mL/Hr) IV Continuous <Continuous>  sodium chloride 0.9%. 1000 milliLiter(s) (5 mL/Hr) IV Continuous <Continuous>  vancomycin  IVPB 1500 milliGRAM(s) IV Intermittent every 12 hours    MEDICATIONS  (PRN):  acetaminophen     Tablet .. 650 milliGRAM(s) Oral every 6 hours PRN Mild Pain (1 - 3)  oxyCODONE    IR 5 milliGRAM(s) Oral every 4 hours PRN Moderate Pain (4 - 6)  oxyCODONE    IR 10 milliGRAM(s) Oral every 4 hours PRN Severe Pain (7 - 10)          Lines:     Physical Exam  Neuro: A+O x 3, non-focal, speech clear and intact  HEENT:  NCAT, PERRL, EOMI. No conjuctival edema or iIcterus, no thrush.  No ETT or NGT/OGT  Neck:  ROM intact, no JVD, no nodes or masses palpable, trachea midline, no tracheostomy  Pulm: CTA, good air entry, equal bilaterally, no rales/rhonchi/wheezing, no accessory muscle use noted  Chest:        mediastinal CT and left pleural CT all with dressings intact and no air leak, no subQ emphysema       +PW attatched to pacing box  CV: RRR, S1, S2. No murmurs, rubs, or gallops noted.  Abd: +BSx4. Soft, nontender, nondistended.  : onofre in situ to bedside drainage  Ext: BEACH x 4, no edema, no cyanosis or clubbing, distal motor/neuro/circ intact  Skin: warm, dry, no rashes                 47yMale with PMH     CABG, with saphenous vein graft        PAST MEDICAL & SURGICAL HISTORY:  HTN (hypertension)      DM (diabetes mellitus)

## 2022-11-24 NOTE — PROGRESS NOTE ADULT - SUBJECTIVE AND OBJECTIVE BOX
Interval Events:  no overnight events  follow up on diabetes    patient seen and examined at bedside.  family at bedside  on insulin gtt 0.5u/hr  had emergent CABG yesterday due to LM dissection  has a lot of pain, not feeling great    REVIEW OF SYSTEMS:    CONSTITUTIONAL: No fever, weight loss, or fatigue  EYES: No eye pain, visual disturbances, or discharge  ENMT:  No difficulty hearing, tinnitus, vertigo; No sinus or throat pain  NECK: No pain or stiffness  RESPIRATORY: No cough, wheezing, chills or hemoptysis; No shortness of breath  CARDIOVASCULAR: No chest pain, palpitations, dizziness, or leg swelling  GASTROINTESTINAL: No abdominal or epigastric pain. No nausea, vomiting, or hematemesis; No diarrhea or constipation. No melena or hematochezia.  NEUROLOGICAL: No headaches, memory loss, loss of strength, numbness, or tremors  SKIN: No itching, burning, rashes, or lesions   MUSCULOSKELETAL: No joint pain or swelling; No muscle, back, or extremity pain  PSYCHIATRIC: No depression, anxiety, mood swings, or difficulty sleeping        Allergy Status Unknown  pork (Stomach Upset)      MEDICATIONS  (STANDING):  ascorbic acid 500 milliGRAM(s) Oral daily  aspirin enteric coated 81 milliGRAM(s) Oral daily  atorvastatin 80 milliGRAM(s) Oral at bedtime  cefuroxime  IVPB 1500 milliGRAM(s) IV Intermittent every 8 hours  chlorhexidine 2% Cloths 1 Application(s) Topical daily  dextrose 5%. 1000 milliLiter(s) (50 mL/Hr) IV Continuous <Continuous>  dextrose 5%. 1000 milliLiter(s) (100 mL/Hr) IV Continuous <Continuous>  dextrose 50% Injectable 50 milliLiter(s) IV Push every 15 minutes  dextrose 50% Injectable 25 milliLiter(s) IV Push every 15 minutes  ferrous    sulfate 325 milliGRAM(s) Oral daily  folic acid 1 milliGRAM(s) Oral daily  gabapentin 300 milliGRAM(s) Oral three times a day  glucagon  Injectable 1 milliGRAM(s) IntraMuscular once  insulin lispro Injectable (ADMELOG) 2 Unit(s) SubCutaneous three times a day before meals  insulin regular Infusion 2 Unit(s)/Hr (2 mL/Hr) IV Continuous <Continuous>  lidocaine   4% Patch 1 Patch Transdermal daily  pantoprazole    Tablet 40 milliGRAM(s) Oral before breakfast  polyethylene glycol 3350 17 Gram(s) Oral daily  potassium chloride  10 mEq/50 mL IVPB 10 milliEquivalent(s) IV Intermittent every 1 hour  potassium chloride  10 mEq/50 mL IVPB 10 milliEquivalent(s) IV Intermittent every 1 hour  potassium chloride  10 mEq/50 mL IVPB 10 milliEquivalent(s) IV Intermittent every 1 hour  senna 2 Tablet(s) Oral at bedtime  sodium chloride 0.9%. 1000 milliLiter(s) (10 mL/Hr) IV Continuous <Continuous>  sodium chloride 0.9%. 1000 milliLiter(s) (5 mL/Hr) IV Continuous <Continuous>  vancomycin  IVPB 1250 milliGRAM(s) IV Intermittent every 12 hours    MEDICATIONS  (PRN):  acetaminophen     Tablet .. 650 milliGRAM(s) Oral every 6 hours PRN Mild Pain (1 - 3)  dextrose Oral Gel 15 Gram(s) Oral once PRN Blood Glucose LESS THAN 70 milliGRAM(s)/deciliter  oxyCODONE    IR 5 milliGRAM(s) Oral every 4 hours PRN Moderate Pain (4 - 6)  oxyCODONE    IR 10 milliGRAM(s) Oral every 4 hours PRN Severe Pain (7 - 10)      Vital Signs Last 24 Hrs  T(C): 37.7 (24 Nov 2022 11:45), Max: 37.7 (24 Nov 2022 11:45)  T(F): 99.9 (24 Nov 2022 11:45), Max: 99.9 (24 Nov 2022 11:45)  HR: 75 (24 Nov 2022 14:00) (60 - 75)  BP: --  BP(mean): --  RR: 13 (24 Nov 2022 14:00) (10 - 24)  SpO2: 93% (24 Nov 2022 14:00) (93% - 100%)    Parameters below as of 24 Nov 2022 07:30  Patient On (Oxygen Delivery Method): nasal cannula w/ humidification  O2 Flow (L/min): 2    Weight (kg): 89.8 (11-19-22 @ 19:32)    Physical Exam:    Constitutional: NAD, well-developed  Respiratory: CTAB, normal respirations  Cardiovascular: S1 and S2, RRR, bandaged anterior chest  Gastrointestinal: BS+, soft, ntnd  Extremities: + peripheral edema  Neurological: AOx3, no focal deficits  Psychiatric: Normal mood and normal affect  Skin: no rashes, no acanthosis    LABS  11-24    140  |  107  |  15.7  ----------------------------<  113<H>  4.2   |  23.0  |  0.63    Ca    8.1<L>      24 Nov 2022 01:30  Phos  2.6     11-23  Mg     2.4     11-24    TPro  5.5<L>  /  Alb  3.7  /  TBili  0.9  /  DBili  x   /  AST  32  /  ALT  14  /  AlkPhos  51  11-24                          9.0    17.68 )-----------( 208      ( 24 Nov 2022 01:30 )             26.4     Triglycerides, Serum: 114 mg/dL (11-23-22 @ 06:00)  HDL Cholesterol, Serum: 35 mg/dL (11-23-22 @ 06:00)  Cholesterol, Serum: 175 mg/dL (11-23-22 @ 06:00)    A1C with Estimated Average Glucose Result: 11.8 % (11-20-22 @ 19:47)    Aspartate Aminotransferase (AST/SGOT): 32 U/L (11-24-22 @ 01:30)  Alanine Aminotransferase (ALT/SGPT): 14 U/L (11-24-22 @ 01:30)  Alkaline Phosphatase, Serum: 51 U/L (11-24-22 @ 01:30)  Albumin, Serum: 3.7 g/dL (11-24-22 @ 01:30)  Albumin, Serum: 3.4 g/dL (11-23-22 @ 17:30)  Aspartate Aminotransferase (AST/SGOT): 26 U/L (11-23-22 @ 17:30)  Alanine Aminotransferase (ALT/SGPT): 13 U/L (11-23-22 @ 17:30)  Alkaline Phosphatase, Serum: 51 U/L (11-23-22 @ 17:30)          CAPILLARY BLOOD GLUCOSE      POCT Blood Glucose.: 186 mg/dL (24 Nov 2022 14:00)  POCT Blood Glucose.: 187 mg/dL (24 Nov 2022 13:02)  POCT Blood Glucose.: 158 mg/dL (24 Nov 2022 11:41)  POCT Blood Glucose.: 126 mg/dL (24 Nov 2022 10:58)  POCT Blood Glucose.: 118 mg/dL (24 Nov 2022 09:44)  POCT Blood Glucose.: 109 mg/dL (24 Nov 2022 08:43)  POCT Blood Glucose.: 110 mg/dL (24 Nov 2022 07:37)  POCT Blood Glucose.: 108 mg/dL (24 Nov 2022 06:21)  POCT Blood Glucose.: 115 mg/dL (24 Nov 2022 05:08)  POCT Blood Glucose.: 102 mg/dL (24 Nov 2022 03:55)  POCT Blood Glucose.: 119 mg/dL (24 Nov 2022 01:59)  POCT Blood Glucose.: 125 mg/dL (24 Nov 2022 01:13)  POCT Blood Glucose.: 144 mg/dL (23 Nov 2022 23:58)  POCT Blood Glucose.: 142 mg/dL (23 Nov 2022 22:51)  POCT Blood Glucose.: 159 mg/dL (23 Nov 2022 21:51)  POCT Blood Glucose.: 158 mg/dL (23 Nov 2022 20:51)  POCT Blood Glucose.: 171 mg/dL (23 Nov 2022 20:10)  POCT Blood Glucose.: 183 mg/dL (23 Nov 2022 18:50)

## 2022-11-24 NOTE — DIETITIAN INITIAL EVALUATION ADULT - OTHER INFO
47 yr old male smoker, hypertension, diabetes mellitus presented initially with complaints of chest pain that started on Saturday. States he had mid sternal chest pain about 3 episodes, on and off, pressure like, radiating to both arms. He was admitted to observation and had an abnormal cardiac CT and hence being admitted for LHC. Per chart, patient eats junk food, smokes heavily and works late hours.  S/p LHC 11/22 with WENDY x 2 to LCx.  Pt was planned for staged procedure today for pRamus 95%.  Patient now s/p LHC demonstrating dissection of the left main. IABP placed for support. CTS consulted for CABG eval.  11/23 Dissection of LAD s/p LCx PCI, R-IABP insertion à Emergent C3V (SVG-LAD, SVG-OM, SVG-Tonia)

## 2022-11-24 NOTE — DIETITIAN INITIAL EVALUATION ADULT - NS FNS DIET ORDER
Diet, Clear Liquid:   Consistent Carbohydrate {No Snacks} (CSTCHO)  DASH/TLC {Sodium & Cholesterol Restricted} (DASH) (11-24-22 @ 07:21)  Diet, Regular:   Consistent Carbohydrate {No Snacks} (CSTCHO)  DASH/TLC {Sodium & Cholesterol Restricted} (DASH) (11-23-22 @ 12:53)

## 2022-11-24 NOTE — PROGRESS NOTE ADULT - ASSESSMENT
47M smoker w/ HTN, T2DM presented initially with complaints of sternal chest pain, radiating to both arms. Per brother at bedside, patient eats junk food, smokes heavily and works late hours. He was admitted to observation and had an abnormal cardiac CT and hence being admitted for [facility]. Course cb LM dissection requiring emergent CABG.  Consult for diabetes mgmt. A1c 11.8    Uncontrolled T2DM- hyperglycemic related to not eating properly and noncompliance with meds. FS reasonable at this point  -A1c 11.8  -check sugars AC and bedtime  -ensure diabetic diet  -transition to lantus 12 units bedtime tonight with sliding scale  -hold off on premeal admelog for now  -needs to be seen by nutrition and CDE for insulin/meter teach  -might be able to be dced on orals vs basal insulin with orals, to be determined depending on how much he is on as an inpatient    CAD- s/p 2DES to LCx which was complicated by LM dissection. now s/p 3VCABG, care per cardiology    HLD- continue statin

## 2022-11-24 NOTE — DIETITIAN INITIAL EVALUATION ADULT - ADD RECOMMEND
advance diet to consistent CHO, Dash  Will need diet education prior to discharge.   Continue Vit C, folic acid and feso4

## 2022-11-24 NOTE — DIETITIAN INITIAL EVALUATION ADULT - NSFNSGIIOFT_GEN_A_CORE
11-23-22 @ 07:01  -  11-24-22 @ 07:00  --------------------------------------------------------  OUT:    Chest Tube (mL): 320 mL    Chest Tube (mL): 110 mL    Chest Tube (mL): 210 mL  Total OUT: 640 mL    Total NET: -640 mL      11-24-22 @ 07:01  -  11-24-22 @ 09:58  --------------------------------------------------------  OUT:    Chest Tube (mL): 30 mL    Chest Tube (mL): 30 mL    Chest Tube (mL): 0 mL  Total OUT: 60 mL    Total NET: -60 mL

## 2022-11-24 NOTE — PROGRESS NOTE ADULT - ASSESSMENT
46 y/o male with hx of HTN, DM2, current smoker who presents with chest pain. Pain is midsternal, nonradiating but associated with some b/l hand discomfort. 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 Pt received  mg in ED. He was found hypertensive 221/103 and was treated with Labetalol and Hydralazine IV. Troponin negative x1, proBNP normal. EKG shows SR with nonspecific T-wave abnormality. Pt does not have a cardiologist and denies any prior cardiac issues.    WENDY x 2 LCX  LM dissection -> CABG 3v

## 2022-11-24 NOTE — PROGRESS NOTE ADULT - SUBJECTIVE AND OBJECTIVE BOX
[facility]                                                         CARDIOLOGY AT [facility]                                                                  [address] [address]                                                         Telephone: [phone]. Fax:[phone]                                                                             PROGRESS NOTE    Reason for follow up: CAD  Update: weaned of pressors      Review of symptoms:   Cardiac:  No chest pain. No dyspnea. No palpitations.  Respiratory: no cough. No dyspnea  Gastrointestinal: No diarrhea. No abdominal pain. No bleeding.   Neuro: No focal neuro complaints.    Vitals:  T(C): 37.4 (11-24-22 @ 08:00), Max: 37.4 (11-24-22 @ 08:00)  HR: 72 (11-24-22 @ 11:00) (60 - 75)  BP: --  RR: 20 (11-24-22 @ 11:00) (10 - 24)  SpO2: 99% (11-24-22 @ 11:00) (96% - 100%)  Wt(kg): --  I&O's Summary    23 Nov 2022 07:01  -  24 Nov 2022 07:00  --------------------------------------------------------  IN: 1158.9 mL / OUT: 2630 mL / NET: -1471.1 mL    24 Nov 2022 07:01  -  24 Nov 2022 11:22  --------------------------------------------------------  IN: 61 mL / OUT: 435 mL / NET: -374 mL      Weight (kg): 89.8 (11-19 @ 19:32)    PHYSICAL EXAM:  Appearance: Comfortable. No acute distress  HEENT:  Atraumatic. Normocephalic.  Normal oral mucosa  Neurologic: A & O x 3, no gross focal deficits.  Cardiovascular: RRR S1 S2, No murmur, no rubs/gallops. No JVD, +drain  Respiratory: Lungs clear to auscultation, unlabored   Gastrointestinal:  Soft, Non-tender, + BS  Lower Extremities: 2+ Peripheral Pulses, No clubbing, cyanosis, or edema  Psychiatry: Patient is calm. No agitation.   Skin: warm and dry.    CURRENT CARDIAC MEDICATIONS:      CURRENT OTHER MEDICATIONS:  cefuroxime  IVPB 1500 milliGRAM(s) IV Intermittent every 8 hours  vancomycin  IVPB 1250 milliGRAM(s) IV Intermittent every 12 hours  acetaminophen     Tablet .. 650 milliGRAM(s) Oral every 6 hours PRN Mild Pain (1 - 3)  gabapentin 300 milliGRAM(s) Oral three times a day  HYDROmorphone  Injectable 0.5 milliGRAM(s) IV Push once  oxyCODONE    IR 5 milliGRAM(s) Oral every 4 hours PRN Moderate Pain (4 - 6)  oxyCODONE    IR 10 milliGRAM(s) Oral every 4 hours PRN Severe Pain (7 - 10)  pantoprazole    Tablet 40 milliGRAM(s) Oral before breakfast  polyethylene glycol 3350 17 Gram(s) Oral daily  senna 2 Tablet(s) Oral at bedtime  atorvastatin 80 milliGRAM(s) Oral at bedtime  dextrose 50% Injectable 50 milliLiter(s) IV Push every 15 minutes, Stop order after: 1 Doses  dextrose 50% Injectable 25 milliLiter(s) IV Push every 15 minutes, Stop order after: 1 Doses  dextrose Oral Gel 15 Gram(s) Oral once, Stop order after: 1 Doses PRN Blood Glucose LESS THAN 70 milliGRAM(s)/deciliter  glucagon  Injectable 1 milliGRAM(s) IntraMuscular once, Stop order after: 1 Doses  insulin lispro Injectable (ADMELOG) 2 Unit(s) SubCutaneous three times a day before meals  insulin regular Infusion 2 Unit(s)/Hr (2 mL/Hr) IV Continuous <Continuous>  ascorbic acid 500 milliGRAM(s) Oral daily  aspirin enteric coated 81 milliGRAM(s) Oral daily  chlorhexidine 2% Cloths 1 Application(s) Topical daily  dextrose 5%. 1000 milliLiter(s) (50 mL/Hr) IV Continuous <Continuous>  dextrose 5%. 1000 milliLiter(s) (100 mL/Hr) IV Continuous <Continuous>  ferrous    sulfate 325 milliGRAM(s) Oral daily  folic acid 1 milliGRAM(s) Oral daily  lidocaine   4% Patch 1 Patch Transdermal daily  potassium chloride  10 mEq/50 mL IVPB 10 milliEquivalent(s) IV Intermittent every 1 hour, Stop order after: 3 Doses  potassium chloride  10 mEq/50 mL IVPB 10 milliEquivalent(s) IV Intermittent every 1 hour, Stop order after: 2 Doses  potassium chloride  10 mEq/50 mL IVPB 10 milliEquivalent(s) IV Intermittent every 1 hour, Stop order after: 1 Doses  sodium chloride 0.9%. 1000 milliLiter(s) (10 mL/Hr) IV Continuous <Continuous>  sodium chloride 0.9%. 1000 milliLiter(s) (5 mL/Hr) IV Continuous <Continuous>      LABS:	 	  ( 24 Nov 2022 01:30 )  Troponin T  0.34<H>,  CPK  305<H>, CKMB  X    , BNP X        , ( 23 Nov 2022 17:30 )  Troponin T  0.28<H>,  CPK  185  , CKMB  X    , BNP X        , ( 21 Nov 2022 08:04 )  Troponin T  0.01 ,  CPK  X    , CKMB  X    , BNP X                                  9.0    17.68 )-----------( 208      ( 24 Nov 2022 01:30 )             26.4     11-24    140  |  107  |  15.7  ----------------------------<  113<H>  4.2   |  23.0  |  0.63    Ca    8.1<L>      24 Nov 2022 01:30  Phos  2.6     11-23  Mg     2.4     11-24    TPro  5.5<L>  /  Alb  3.7  /  TBili  0.9  /  DBili  x   /  AST  32  /  ALT  14  /  AlkPhos  51  11-24    PT/INR/PTT ( 24 Nov 2022 01:30 )                       :                       :      16.0         :       X                     .        .                   .              .           .       1.37        .                                       Lipid Profile: Date: 11-23 @ 06:00  Total cholesterol 175; Direct LDL: --; HDL: 35; Triglycerides:114    HgA1c:   TSH:     TELEMETRY:   ECG:    DIAGNOSTIC TESTING:  [ ] Echocardiogram:   [ ]  Catheterization:  [ ] Stress Test:    OTHER: 	                                                                [facility]                                                         CARDIOLOGY AT [facility]                                                                  [address] [address]                                                         Telephone: [phone]. Fax:[phone]                                                                             PROGRESS NOTE    Reason for follow up: CAD  Update: weaned of pressors      Review of symptoms:   Cardiac:  No chest pain. No dyspnea. No palpitations.  Respiratory: no cough. No dyspnea  Gastrointestinal: No diarrhea. No abdominal pain. No bleeding.   Neuro: No focal neuro complaints.    Vitals:  T(C): 37.4 (11-24-22 @ 08:00), Max: 37.4 (11-24-22 @ 08:00)  HR: 72 (11-24-22 @ 11:00) (60 - 75)  BP: --  RR: 20 (11-24-22 @ 11:00) (10 - 24)  SpO2: 99% (11-24-22 @ 11:00) (96% - 100%)  Wt(kg): --  I&O's Summary    23 Nov 2022 07:01  -  24 Nov 2022 07:00  --------------------------------------------------------  IN: 1158.9 mL / OUT: 2630 mL / NET: -1471.1 mL    24 Nov 2022 07:01  -  24 Nov 2022 11:22  --------------------------------------------------------  IN: 61 mL / OUT: 435 mL / NET: -374 mL      Weight (kg): 89.8 (11-19 @ 19:32)    PHYSICAL EXAM:  Appearance: Comfortable. No acute distress  HEENT:  Atraumatic. Normocephalic.  Normal oral mucosa  Neurologic: A & O x 3, no gross focal deficits.  Cardiovascular: RRR S1 S2, No murmur, no rubs/gallops. No JVD, +drain  Respiratory: Lungs clear to auscultation, unlabored   Gastrointestinal:  Soft, Non-tender, + BS  Lower Extremities: 2+ Peripheral Pulses, No clubbing, cyanosis, or edema  Psychiatry: Patient is calm. No agitation.   Skin: warm and dry.    CURRENT CARDIAC MEDICATIONS:      CURRENT OTHER MEDICATIONS:  cefuroxime  IVPB 1500 milliGRAM(s) IV Intermittent every 8 hours  vancomycin  IVPB 1250 milliGRAM(s) IV Intermittent every 12 hours  acetaminophen     Tablet .. 650 milliGRAM(s) Oral every 6 hours PRN Mild Pain (1 - 3)  gabapentin 300 milliGRAM(s) Oral three times a day  HYDROmorphone  Injectable 0.5 milliGRAM(s) IV Push once  oxyCODONE    IR 5 milliGRAM(s) Oral every 4 hours PRN Moderate Pain (4 - 6)  oxyCODONE    IR 10 milliGRAM(s) Oral every 4 hours PRN Severe Pain (7 - 10)  pantoprazole    Tablet 40 milliGRAM(s) Oral before breakfast  polyethylene glycol 3350 17 Gram(s) Oral daily  senna 2 Tablet(s) Oral at bedtime  atorvastatin 80 milliGRAM(s) Oral at bedtime  dextrose 50% Injectable 50 milliLiter(s) IV Push every 15 minutes, Stop order after: 1 Doses  dextrose 50% Injectable 25 milliLiter(s) IV Push every 15 minutes, Stop order after: 1 Doses  dextrose Oral Gel 15 Gram(s) Oral once, Stop order after: 1 Doses PRN Blood Glucose LESS THAN 70 milliGRAM(s)/deciliter  glucagon  Injectable 1 milliGRAM(s) IntraMuscular once, Stop order after: 1 Doses  insulin lispro Injectable (ADMELOG) 2 Unit(s) SubCutaneous three times a day before meals  insulin regular Infusion 2 Unit(s)/Hr (2 mL/Hr) IV Continuous <Continuous>  ascorbic acid 500 milliGRAM(s) Oral daily  aspirin enteric coated 81 milliGRAM(s) Oral daily  chlorhexidine 2% Cloths 1 Application(s) Topical daily  dextrose 5%. 1000 milliLiter(s) (50 mL/Hr) IV Continuous <Continuous>  dextrose 5%. 1000 milliLiter(s) (100 mL/Hr) IV Continuous <Continuous>  ferrous    sulfate 325 milliGRAM(s) Oral daily  folic acid 1 milliGRAM(s) Oral daily  lidocaine   4% Patch 1 Patch Transdermal daily  potassium chloride  10 mEq/50 mL IVPB 10 milliEquivalent(s) IV Intermittent every 1 hour, Stop order after: 3 Doses  potassium chloride  10 mEq/50 mL IVPB 10 milliEquivalent(s) IV Intermittent every 1 hour, Stop order after: 2 Doses  potassium chloride  10 mEq/50 mL IVPB 10 milliEquivalent(s) IV Intermittent every 1 hour, Stop order after: 1 Doses  sodium chloride 0.9%. 1000 milliLiter(s) (10 mL/Hr) IV Continuous <Continuous>  sodium chloride 0.9%. 1000 milliLiter(s) (5 mL/Hr) IV Continuous <Continuous>      LABS:	 	  ( 24 Nov 2022 01:30 )  Troponin T  0.34<H>,  CPK  305<H>, CKMB  X    , BNP X        , ( 23 Nov 2022 17:30 )  Troponin T  0.28<H>,  CPK  185  , CKMB  X    , BNP X        , ( 21 Nov 2022 08:04 )  Troponin T  0.01 ,  CPK  X    , CKMB  X    , BNP X                                  9.0    17.68 )-----------( 208      ( 24 Nov 2022 01:30 )             26.4     11-24    140  |  107  |  15.7  ----------------------------<  113<H>  4.2   |  23.0  |  0.63    Ca    8.1<L>      24 Nov 2022 01:30  Phos  2.6     11-23  Mg     2.4     11-24    TPro  5.5<L>  /  Alb  3.7  /  TBili  0.9  /  DBili  x   /  AST  32  /  ALT  14  /  AlkPhos  51  11-24    PT/INR/PTT ( 24 Nov 2022 01:30 )                       :                       :      16.0         :       X                     .        .                   .              .           .       1.37        .                                       Lipid Profile: Date: 11-23 @ 06:00  Total cholesterol 175; Direct LDL: --; HDL: 35; Triglycerides:114    HgA1c:   TSH:     TELEMETRY: SR  ECG:  < from: 12 Lead ECG (11.24.22 @ 04:18) >   Normal sinus rhythm  Nonspecific ST and T wave abnormality  Abnormal ECG    < end of copied text >    DIAGNOSTIC TESTING:    [ ] Echocardiogram:   < from: TTE Echo Complete w/ Contrast w/ Doppler (11.20.22 @ 08:49) >  PHYSICIAN INTERPRETATION:  Left Ventricle: The left ventricular internal cavity size is normal.  Global LVsystolic function was normal. Left ventricular ejection   fraction, by visual estimation, is 60 to 65%.  Right Ventricle: Normal right ventricular size and function.  Left Atrium: The left atrium is normal in size.  Right Atrium: The right atrium is normal in size.  Pericardium: There is no evidence of pericardial effusion.  Mitral Valve: The mitral valve is normal in structure. Mitral leaflet   mobility is normal.  Tricuspid Valve: Trivial tricuspid regurgitation is visualized.  Aortic Valve: The aortic valve is trileaflet. No evidence of aortic valve   regurgitation is seen.  Pulmonic Valve: Trace pulmonic valve regurgitation.  Aorta: The aortic root is normal in size and structure.  Pulmonary Artery: The main pulmonary artery is normal in size.  Venous: The inferior vena cava was normal sized, with respiratory size   variation greater than 50%.  In comparison to the previous echocardiogram(s): There are no prior   studies on this patient for comparison purposes.    < end of copied text >  [ ]  Catheterization:  < from: Cardiac Catheterization (11.22.22 @ 14:44) >  Conclusions:   There is significant double vessel coronary artery diseaseS/p  Successful PCI of Left Circumflex with 2 drug eluting stents.  Recommendations:     1. Dual antiplatelet therapy for at least 12 months.   2. Continue GP2B3A inhibitor for 12 hours.   3. Return to elective PCI of Ramus Intermedius tomorrow.   Acute complication:    No complications     < end of copied text >

## 2022-11-24 NOTE — PROGRESS NOTE ADULT - ASSESSMENT
47 yr old male smoker, hypertension, diabetes mellitus presented initially with complaints of chest pain that started on Saturday. States he had mid sternal chest pain about 3 episodes, on and off, pressure like, radiating to both arms. He was admitted to observation and had an abnormal cardiac CT and hence being admitted for LHC. Per chart, patient eats junk food, smokes heavily and works late hours.  S/p LHC 11/22 with WENDY x 2 to LCx.  Pt was planned for staged procedure today for pRamus 95%.  Patient now s/p LHC demonstrating dissection of the left main. IABP placed for support. CTS consulted for CABG eval.  11/23 Dissection of LAD s/p LCx PCI, R-IABP insertion à Emergent C3V (SVG-LAD, SVG-OM, SVG-Tonia)    pt remains on low dose levophed for BP support. Pt volume resuscitated for hypovolemic shock. Acute blood loss anemia requiring blood transfusion. Pt will likely need PRBC in AM.

## 2022-11-24 NOTE — DIETITIAN INITIAL EVALUATION ADULT - PERTINENT LABORATORY DATA
11-24    140  |  107  |  15.7  ----------------------------<  113<H>  4.2   |  23.0  |  0.63    Ca    8.1<L>      24 Nov 2022 01:30  Phos  2.6     11-23  Mg     2.4     11-24    TPro  5.5<L>  /  Alb  3.7  /  TBili  0.9  /  DBili  x   /  AST  32  /  ALT  14  /  AlkPhos  51  11-24  POCT Blood Glucose.: 118 mg/dL (11-24-22 @ 09:44)  A1C with Estimated Average Glucose Result: 11.8 % (11-20-22 @ 19:47)

## 2022-11-25 DIAGNOSIS — I10 ESSENTIAL (PRIMARY) HYPERTENSION: ICD-10-CM

## 2022-11-25 LAB
ANION GAP SERPL CALC-SCNC: 12 MMOL/L — SIGNIFICANT CHANGE UP (ref 5–17)
BUN SERPL-MCNC: 16 MG/DL — SIGNIFICANT CHANGE UP (ref 8–20)
CALCIUM SERPL-MCNC: 8.4 MG/DL — SIGNIFICANT CHANGE UP (ref 8.4–10.5)
CHLORIDE SERPL-SCNC: 102 MMOL/L — SIGNIFICANT CHANGE UP (ref 96–108)
CO2 SERPL-SCNC: 24 MMOL/L — SIGNIFICANT CHANGE UP (ref 22–29)
CREAT SERPL-MCNC: 0.74 MG/DL — SIGNIFICANT CHANGE UP (ref 0.5–1.3)
EGFR: 113 ML/MIN/1.73M2 — SIGNIFICANT CHANGE UP
GLUCOSE BLDC GLUCOMTR-MCNC: 173 MG/DL — HIGH (ref 70–99)
GLUCOSE BLDC GLUCOMTR-MCNC: 213 MG/DL — HIGH (ref 70–99)
GLUCOSE BLDC GLUCOMTR-MCNC: 220 MG/DL — HIGH (ref 70–99)
GLUCOSE BLDC GLUCOMTR-MCNC: 234 MG/DL — HIGH (ref 70–99)
GLUCOSE SERPL-MCNC: 190 MG/DL — HIGH (ref 70–99)
HCT VFR BLD CALC: 26.5 % — LOW (ref 39–50)
HGB BLD-MCNC: 8.8 G/DL — LOW (ref 13–17)
MAGNESIUM SERPL-MCNC: 1.9 MG/DL — SIGNIFICANT CHANGE UP (ref 1.6–2.6)
MCHC RBC-ENTMCNC: 27.9 PG — SIGNIFICANT CHANGE UP (ref 27–34)
MCHC RBC-ENTMCNC: 33.2 GM/DL — SIGNIFICANT CHANGE UP (ref 32–36)
MCV RBC AUTO: 84.1 FL — SIGNIFICANT CHANGE UP (ref 80–100)
PLATELET # BLD AUTO: 190 K/UL — SIGNIFICANT CHANGE UP (ref 150–400)
POTASSIUM SERPL-MCNC: 4.7 MMOL/L — SIGNIFICANT CHANGE UP (ref 3.5–5.3)
POTASSIUM SERPL-SCNC: 4.7 MMOL/L — SIGNIFICANT CHANGE UP (ref 3.5–5.3)
RBC # BLD: 3.15 M/UL — LOW (ref 4.2–5.8)
RBC # FLD: 14 % — SIGNIFICANT CHANGE UP (ref 10.3–14.5)
SODIUM SERPL-SCNC: 138 MMOL/L — SIGNIFICANT CHANGE UP (ref 135–145)
WBC # BLD: 18.13 K/UL — HIGH (ref 3.8–10.5)
WBC # FLD AUTO: 18.13 K/UL — HIGH (ref 3.8–10.5)

## 2022-11-25 PROCEDURE — 71045 X-RAY EXAM CHEST 1 VIEW: CPT | Mod: 26

## 2022-11-25 PROCEDURE — 99233 SBSQ HOSP IP/OBS HIGH 50: CPT

## 2022-11-25 PROCEDURE — 99232 SBSQ HOSP IP/OBS MODERATE 35: CPT | Mod: 25

## 2022-11-25 PROCEDURE — 93010 ELECTROCARDIOGRAM REPORT: CPT

## 2022-11-25 RX ORDER — INSULIN GLARGINE 100 [IU]/ML
18 INJECTION, SOLUTION SUBCUTANEOUS AT BEDTIME
Refills: 0 | Status: DISCONTINUED | OUTPATIENT
Start: 2022-11-25 | End: 2022-11-27

## 2022-11-25 RX ORDER — INSULIN LISPRO 100/ML
VIAL (ML) SUBCUTANEOUS
Refills: 0 | Status: DISCONTINUED | OUTPATIENT
Start: 2022-11-25 | End: 2022-11-27

## 2022-11-25 RX ORDER — AMLODIPINE BESYLATE 2.5 MG/1
5 TABLET ORAL DAILY
Refills: 0 | Status: DISCONTINUED | OUTPATIENT
Start: 2022-11-25 | End: 2022-11-29

## 2022-11-25 RX ORDER — MAGNESIUM SULFATE 500 MG/ML
2 VIAL (ML) INJECTION ONCE
Refills: 0 | Status: COMPLETED | OUTPATIENT
Start: 2022-11-25 | End: 2022-11-25

## 2022-11-25 RX ORDER — INSULIN LISPRO 100/ML
3 VIAL (ML) SUBCUTANEOUS
Refills: 0 | Status: DISCONTINUED | OUTPATIENT
Start: 2022-11-25 | End: 2022-11-27

## 2022-11-25 RX ORDER — LABETALOL HCL 100 MG
10 TABLET ORAL ONCE
Refills: 0 | Status: COMPLETED | OUTPATIENT
Start: 2022-11-25 | End: 2022-11-25

## 2022-11-25 RX ORDER — ENOXAPARIN SODIUM 100 MG/ML
40 INJECTION SUBCUTANEOUS EVERY 24 HOURS
Refills: 0 | Status: DISCONTINUED | OUTPATIENT
Start: 2022-11-25 | End: 2022-11-29

## 2022-11-25 RX ORDER — SODIUM CHLORIDE 9 MG/ML
3 INJECTION INTRAMUSCULAR; INTRAVENOUS; SUBCUTANEOUS EVERY 8 HOURS
Refills: 0 | Status: DISCONTINUED | OUTPATIENT
Start: 2022-11-25 | End: 2022-11-29

## 2022-11-25 RX ADMIN — SENNA PLUS 2 TABLET(S): 8.6 TABLET ORAL at 21:21

## 2022-11-25 RX ADMIN — OXYCODONE HYDROCHLORIDE 10 MILLIGRAM(S): 5 TABLET ORAL at 09:14

## 2022-11-25 RX ADMIN — CLOPIDOGREL BISULFATE 75 MILLIGRAM(S): 75 TABLET, FILM COATED ORAL at 11:36

## 2022-11-25 RX ADMIN — OXYCODONE HYDROCHLORIDE 10 MILLIGRAM(S): 5 TABLET ORAL at 08:14

## 2022-11-25 RX ADMIN — Medication 500 MILLIGRAM(S): at 11:35

## 2022-11-25 RX ADMIN — GABAPENTIN 300 MILLIGRAM(S): 400 CAPSULE ORAL at 05:57

## 2022-11-25 RX ADMIN — Medication 3 UNIT(S): at 14:07

## 2022-11-25 RX ADMIN — AMLODIPINE BESYLATE 5 MILLIGRAM(S): 2.5 TABLET ORAL at 03:38

## 2022-11-25 RX ADMIN — Medication 650 MILLIGRAM(S): at 03:20

## 2022-11-25 RX ADMIN — POLYETHYLENE GLYCOL 3350 17 GRAM(S): 17 POWDER, FOR SOLUTION ORAL at 11:36

## 2022-11-25 RX ADMIN — Medication 325 MILLIGRAM(S): at 11:36

## 2022-11-25 RX ADMIN — Medication 2 UNIT(S): at 08:05

## 2022-11-25 RX ADMIN — Medication 25 GRAM(S): at 05:57

## 2022-11-25 RX ADMIN — GABAPENTIN 300 MILLIGRAM(S): 400 CAPSULE ORAL at 14:06

## 2022-11-25 RX ADMIN — CHLORHEXIDINE GLUCONATE 1 APPLICATION(S): 213 SOLUTION TOPICAL at 14:08

## 2022-11-25 RX ADMIN — Medication 4: at 14:06

## 2022-11-25 RX ADMIN — PANTOPRAZOLE SODIUM 40 MILLIGRAM(S): 20 TABLET, DELAYED RELEASE ORAL at 08:05

## 2022-11-25 RX ADMIN — SODIUM CHLORIDE 3 MILLILITER(S): 9 INJECTION INTRAMUSCULAR; INTRAVENOUS; SUBCUTANEOUS at 13:56

## 2022-11-25 RX ADMIN — Medication 1 MILLIGRAM(S): at 11:35

## 2022-11-25 RX ADMIN — Medication 100 MILLIGRAM(S): at 11:38

## 2022-11-25 RX ADMIN — OXYCODONE HYDROCHLORIDE 10 MILLIGRAM(S): 5 TABLET ORAL at 03:21

## 2022-11-25 RX ADMIN — LIDOCAINE 1 PATCH: 4 CREAM TOPICAL at 19:00

## 2022-11-25 RX ADMIN — ATORVASTATIN CALCIUM 80 MILLIGRAM(S): 80 TABLET, FILM COATED ORAL at 21:21

## 2022-11-25 RX ADMIN — Medication 81 MILLIGRAM(S): at 11:35

## 2022-11-25 RX ADMIN — ENOXAPARIN SODIUM 40 MILLIGRAM(S): 100 INJECTION SUBCUTANEOUS at 17:10

## 2022-11-25 RX ADMIN — Medication 50 MILLIGRAM(S): at 03:38

## 2022-11-25 RX ADMIN — Medication 2: at 21:21

## 2022-11-25 RX ADMIN — Medication 650 MILLIGRAM(S): at 11:36

## 2022-11-25 RX ADMIN — INSULIN GLARGINE 18 UNIT(S): 100 INJECTION, SOLUTION SUBCUTANEOUS at 21:21

## 2022-11-25 RX ADMIN — OXYCODONE HYDROCHLORIDE 10 MILLIGRAM(S): 5 TABLET ORAL at 03:50

## 2022-11-25 RX ADMIN — OXYCODONE HYDROCHLORIDE 10 MILLIGRAM(S): 5 TABLET ORAL at 14:06

## 2022-11-25 RX ADMIN — Medication 3 UNIT(S): at 17:05

## 2022-11-25 RX ADMIN — LIDOCAINE 1 PATCH: 4 CREAM TOPICAL at 23:00

## 2022-11-25 RX ADMIN — GABAPENTIN 300 MILLIGRAM(S): 400 CAPSULE ORAL at 21:20

## 2022-11-25 RX ADMIN — Medication 650 MILLIGRAM(S): at 03:50

## 2022-11-25 RX ADMIN — Medication 50 MILLIGRAM(S): at 17:05

## 2022-11-25 RX ADMIN — LIDOCAINE 1 PATCH: 4 CREAM TOPICAL at 11:36

## 2022-11-25 RX ADMIN — OXYCODONE HYDROCHLORIDE 10 MILLIGRAM(S): 5 TABLET ORAL at 14:36

## 2022-11-25 RX ADMIN — Medication 100 MILLIGRAM(S): at 03:38

## 2022-11-25 RX ADMIN — Medication 25 GRAM(S): at 10:22

## 2022-11-25 RX ADMIN — LIDOCAINE 1 PATCH: 4 CREAM TOPICAL at 00:02

## 2022-11-25 RX ADMIN — Medication 10 MILLIGRAM(S): at 01:58

## 2022-11-25 RX ADMIN — SODIUM CHLORIDE 3 MILLILITER(S): 9 INJECTION INTRAMUSCULAR; INTRAVENOUS; SUBCUTANEOUS at 21:17

## 2022-11-25 RX ADMIN — Medication 4: at 08:06

## 2022-11-25 RX ADMIN — Medication 166.67 MILLIGRAM(S): at 12:04

## 2022-11-25 NOTE — PROGRESS NOTE ADULT - SUBJECTIVE AND OBJECTIVE BOX
[facility]                                                         CARDIOLOGY AT [place] [address]                                                         Telephone: [phone]. Fax:[phone]                                                                             PROGRESS NOTE    Reason for follow up: CABAG  Update: Patient seen up in chair this am, no complaints of CP or SOB, requiring increase in oxygen 3lnc.       Review of symptoms:   Cardiac:  No chest pain. No dyspnea. No palpitations.  Respiratory: no cough. denies  dyspnea  Gastrointestinal: No diarrhea. No abdominal pain. No bleeding.   Neuro: No focal neuro complaints.    Vitals:  T(C): 37 (11-25-22 @ 08:01), Max: 37.7 (11-24-22 @ 11:45)  HR: 62 (11-25-22 @ 09:00) (58 - 78)  BP: 108/61 (11-25-22 @ 08:00) (108/61 - 135/68)  RR: 28 (11-25-22 @ 09:00) (10 - 38)  SpO2: 97% (11-25-22 @ 09:00) (90% - 100%)  Wt(kg): --  I&O's Summary    24 Nov 2022 07:01  -  25 Nov 2022 07:00  --------------------------------------------------------  IN: 1138 mL / OUT: 2980 mL / NET: -1842 mL    25 Nov 2022 07:01  -  25 Nov 2022 09:53  --------------------------------------------------------  IN: 282.5 mL / OUT: 180 mL / NET: 102.5 mL          PHYSICAL EXAM:  Appearance: . No acute distress  HEENT:  Atraumatic. Normocephalic.  Normal oral mucosa  Neurologic: A & O x 3, no gross focal deficits.  Cardiovascular: RRR S1 S2, No murmur, no rubs/gallops. No JVD  respiratory: Lungs clear to auscultation, unlabored   mediastinal CT and left pleural CT all with dressings intact and no air leak, no subQ emphysema  Gastrointestinal:  Soft, Non-tender, + BS  Lower Extremities: ace wrap bilateral  + edema  Psychiatry: Patient is calm. No agitation.   Skin: warm and dry.    CURRENT CARDIAC MEDICATIONS:  amLODIPine   Tablet 5 milliGRAM(s) Oral daily  metoprolol tartrate 50 milliGRAM(s) Oral two times a day      CURRENT OTHER MEDICATIONS:  cefuroxime  IVPB 1500 milliGRAM(s) IV Intermittent every 8 hours  vancomycin  IVPB 1250 milliGRAM(s) IV Intermittent every 12 hours  acetaminophen     Tablet .. 650 milliGRAM(s) Oral every 6 hours PRN Mild Pain (1 - 3)  gabapentin 300 milliGRAM(s) Oral three times a day  oxyCODONE    IR 5 milliGRAM(s) Oral every 4 hours PRN Moderate Pain (4 - 6)  oxyCODONE    IR 10 milliGRAM(s) Oral every 4 hours PRN Severe Pain (7 - 10)  pantoprazole    Tablet 40 milliGRAM(s) Oral before breakfast  polyethylene glycol 3350 17 Gram(s) Oral daily  senna 2 Tablet(s) Oral at bedtime  atorvastatin 80 milliGRAM(s) Oral at bedtime  dextrose 50% Injectable 50 milliLiter(s) IV Push every 15 minutes, Stop order after: 1 Doses  dextrose 50% Injectable 25 milliLiter(s) IV Push every 15 minutes, Stop order after: 1 Doses  dextrose Oral Gel 15 Gram(s) Oral once, Stop order after: 1 Doses PRN Blood Glucose LESS THAN 70 milliGRAM(s)/deciliter  glucagon  Injectable 1 milliGRAM(s) IntraMuscular once, Stop order after: 1 Doses  insulin glargine Injectable (LANTUS) 18 Unit(s) SubCutaneous at bedtime  insulin lispro (ADMELOG) corrective regimen sliding scale   SubCutaneous three times a day before meals  insulin lispro Injectable (ADMELOG) 3 Unit(s) SubCutaneous three times a day before meals  ascorbic acid 500 milliGRAM(s) Oral daily  aspirin enteric coated 81 milliGRAM(s) Oral daily  chlorhexidine 2% Cloths 1 Application(s) Topical daily  clopidogrel Tablet 75 milliGRAM(s) Oral daily  dextrose 5%. 1000 milliLiter(s) (50 mL/Hr) IV Continuous <Continuous>  dextrose 5%. 1000 milliLiter(s) (100 mL/Hr) IV Continuous <Continuous>  ferrous    sulfate 325 milliGRAM(s) Oral daily  folic acid 1 milliGRAM(s) Oral daily  lidocaine   4% Patch 1 Patch Transdermal daily  magnesium sulfate  IVPB 2 Gram(s) IV Intermittent once, Stop order after: 1 Doses  sodium chloride 0.9% lock flush 3 milliLiter(s) IV Push every 8 hours  sodium chloride 0.9%. 1000 milliLiter(s) (10 mL/Hr) IV Continuous <Continuous>  sodium chloride 0.9%. 1000 milliLiter(s) (5 mL/Hr) IV Continuous <Continuous>      LABS:	 	  ( 24 Nov 2022 01:30 )  Troponin T  0.34<H>,  CPK  305<H>, CKMB  X    , BNP X        , ( 23 Nov 2022 17:30 )  Troponin T  0.28<H>,  CPK  185  , CKMB  X    , BNP X        , ( 21 Nov 2022 08:04 )  Troponin T  0.01 ,  CPK  X    , CKMB  X    , BNP X                                  8.8    18.13 )-----------( 190      ( 25 Nov 2022 02:15 )             26.5     11-25    138  |  102  |  16.0  ----------------------------<  190<H>  4.7   |  24.0  |  0.74    Ca    8.4      25 Nov 2022 02:15  Phos  2.6     11-23  Mg     1.9     11-25    TPro  5.5<L>  /  Alb  3.7  /  TBili  0.9  /  DBili  x   /  AST  32  /  ALT  14  /  AlkPhos  51  11-24    PT/INR/PTT ( 24 Nov 2022 01:30 )                       :                       :      16.0         :       X                     .        .                   .              .           .       1.37        .                                       Lipid Profile: Date: 11-23 @ 06:00  Total cholesterol 175; Direct LDL: --; HDL: 35; Triglycerides:114    HgA1c:   TSH:     TELEMETRY:  NSR 63  ECG:   < from: 12 Lead ECG (11.24.22 @ 04:18) >  Ventricular Rate 61 BPM    Atrial Rate 61 BPM    P-R Interval 142 ms    QRS Duration 90 ms    Q-T Interval 422 ms    QTC Calculation(Bazett) 424 ms    P Axis 62 degrees    R Axis 8 degrees    T Axis 7 degrees    Diagnosis Line Normal sinus rhythm  Nonspecific ST and T wave abnormality  Abnormal ECG    Confirmed by MOHAMUD MARTÍNEZ ([id number]) on 11/24/2022 10:17:25 AM    < end of copied text >      DIAGNOSTIC TESTING:  [ ] Echocardiogram:   < from: TTE Echo Complete w/ Contrast w/ Doppler (11.20.22 @ 08:49) >    PHYSICIAN INTERPRETATION:  Left Ventricle: The left ventricular internal cavity size is normal.  Global LVsystolic function was normal. Left ventricular ejection   fraction, by visual estimation, is 60 to 65%.  Right Ventricle: Normal right ventricular size and function.  Left Atrium: The left atrium is normal in size.  Right Atrium: The right atrium is normal in size.  Pericardium: There is no evidence of pericardial effusion.  Mitral Valve: The mitral valve is normal in structure. Mitral leaflet   mobility is normal.  Tricuspid Valve: Trivial tricuspid regurgitation is visualized.  Aortic Valve: The aortic valve is trileaflet. No evidence of aortic valve   regurgitation is seen.  Pulmonic Valve: Trace pulmonic valve regurgitation.  Aorta: The aortic root is normal in size and structure.  Pulmonary Artery: The main pulmonary artery is normal in size.  Venous: The inferior vena cava was normal sized, with respiratory size   variation greater than 50%.  In comparison to the previous echocardiogram(s): There are no prior   studies on this patient for comparison purposes.      Summary:   1. Leftventricular ejection fraction, by visual estimation, is 60 to   65%.   2. Normal global left ventricular systolic function.   3. Normal right ventricular size and function.   4. No significant valve disease.   5. There is no evidence of pericardial effusion.    JESSICA Crocker Electronically signed on 11/20/2022 at 10:10:36 AM      *[ ]  Catheterization:  < from: Cardiac Catheterization (11.22.22 @ 14:44) >  Right Coronary System:   A catheter was positioned into the vessel ostia under fluoroscopic  guidance. Angiograms were obtained in multiple views.    Diagnostic Findings:     Coronary Angiography   The coronary circulation is right dominant.      LM   Left main artery: No angiograhic evidence of disease .      LAD   Left anterior descending artery: 40% focal distal lesion.      CX   Circumflex: 90% proximal focal lesion..      RCA   Right coronary artery: Normal angiographic appearance.      Ramus   Ramus intermedius: 90% proximal focal lesion..      Interventional Findings:     Interventional Details   Mid circumflex: The initial stenosis was 90 %. IVUS was performed.  Imaging shows plaque is soft, distal reference diameter of  3.7 mm and proximal reference diameter of 4.5 mm. Guidewire crossing  was successful.    A successful Balloon angioplasty was performed using a EUPHORA 2.5 X  15MM During Procedure, a 6F EBU3.75 LAUNCHER  During Procedure, and a DATLON BLUE 190CM During Procedure.    The inflation pressure was 12 norbert for the duration of 14.0 seconds.     A successful IVUS was performed using a EAGLE EYE PLATINUM SHORT TIP  During Procedure.    A successful Drug Eluting Stent was deployed using a DEBBIE FRONTIER  3.50 X 18MM During Procedure.    The inflation pressure was 12 norbert for the duration of 11.0 seconds.     A successful Drug Eluting Stent was deployed using a DEBBIE FRONTIER  3.50 X 18MM During Procedure.    The inflation pressure was 14 norbert for the duration of 14.0 seconds.     A successful Balloon angioplasty was performed using a DEBBIE FRONTIER  3.50 X 18MM.    The inflation pressure was 16 norbert for the duration of 5.0 seconds.     A successful Balloon angioplasty was performed using a EUPHORA NC 3.5  X 12MM During Procedure.    The inflation pressure was 8 norbert for the duration of 8.0 seconds.     The inflation pressure was 16 norbert for the duration of 11.0 seconds.     The inflation pressure was 18 norbert for the duration of 7.0 seconds.     The inflation pressure was 18 norebrt for the duration of 5.0 seconds.     Following intervention there is a 1 % residual stenosis. No  significant vessel dissection noted.    Patient: RIZWAN CARR          MRN: [id number]  Study Date: 11/22/2022   02:44 PM      Page 2 of 5          [ ] Stress Test:    OTHER:

## 2022-11-25 NOTE — PROGRESS NOTE ADULT - SUBJECTIVE AND OBJECTIVE BOX
Subjective: no c/o incisional pain at this time. Denies CP, SOB, palpitations, N/V, other c/o.    all other ROS negative x10 except as noted above     T(C): 37.1 (11-25-22 @ 00:00), Max: 37.7 (11-24-22 @ 11:45)  HR: 68 (11-25-22 @ 02:30) (58 - 78)  BP: 127/67 (11-25-22 @ 02:00) (112/65 - 135/68)  ABP: 136/67 (11-25-22 @ 02:30) (99/57 - 184/70)  ABP(mean): 84 (11-25-22 @ 02:30) (65 - 114)  RR: 20 (11-25-22 @ 02:30) (12 - 24)  SpO2: 96% (11-25-22 @ 02:30) (91% - 100%)  Wt(kg): --  CVP(mm Hg): 17 (11-25-22 @ 02:30) (8 - 23)  CO: 6 (11-24-22 @ 13:00) (4.7 - 6.6)  CI: 2.9 (11-24-22 @ 13:00) (2.3 - 3.3)  PA: 26/7 (11-24-22 @ 15:45) (26/7 - 39/19)       I&O's Detail    23 Nov 2022 07:01  -  24 Nov 2022 07:00  --------------------------------------------------------  IN:    Dexmedetomidine: 62.9 mL    Insulin: 30.5 mL    IV PiggyBack: 500 mL    IV PiggyBack: 100 mL    IV PiggyBack: 50 mL    IV PiggyBack: 150 mL    NiCARdipine: 12.5 mL    Norepinephrine: 13 mL    sodium chloride 0.9%: 160 mL    sodium chloride 0.9%: 80 mL  Total IN: 1158.9 mL    OUT:    Chest Tube (mL): 320 mL    Chest Tube (mL): 110 mL    Chest Tube (mL): 210 mL    Indwelling Catheter - Urethral (mL): 1990 mL  Total OUT: 2630 mL    Total NET: -1471.1 mL      24 Nov 2022 07:01  -  25 Nov 2022 02:35  --------------------------------------------------------  IN:    Insulin: 23 mL    Oral Fluid: 720 mL    sodium chloride 0.9%: 170 mL    sodium chloride 0.9%: 85 mL  Total IN: 998 mL    OUT:    Chest Tube (mL): 170 mL    Chest Tube (mL): 110 mL    Chest Tube (mL): 30 mL    Indwelling Catheter - Urethral (mL): 1560 mL  Total OUT: 1870 mL    Total NET: -872 mL          LABS: All Lab data reviewed and analyzed                        8.8    18.13 )-----------( 190      ( 25 Nov 2022 02:15 )             26.5     11-24    140  |  107  |  15.7  ----------------------------<  113<H>  4.2   |  23.0  |  0.63    Ca    8.1<L>      24 Nov 2022 01:30  Phos  2.6     11-23  Mg     2.4     11-24    TPro  5.5<L>  /  Alb  3.7  /  TBili  0.9  /  DBili  x   /  AST  32  /  ALT  14  /  AlkPhos  51  11-24    PT/INR - ( 24 Nov 2022 01:30 )   PT: 16.0 sec;   INR: 1.37 ratio         PTT - ( 23 Nov 2022 17:30 )  PTT:26.7 sec      ABG - ( 23 Nov 2022 22:41 )  pH, Arterial: 7.410 pH, Blood: x     /  pCO2: 38    /  pO2: 144   / HCO3: 24    / Base Excess: -0.5  /  SaO2: 98.8              CAPILLARY BLOOD GLUCOSE      POCT Blood Glucose.: 131 mg/dL (24 Nov 2022 21:59)  POCT Blood Glucose.: 134 mg/dL (24 Nov 2022 21:19)  POCT Blood Glucose.: 147 mg/dL (24 Nov 2022 20:10)  POCT Blood Glucose.: 164 mg/dL (24 Nov 2022 18:55)  POCT Blood Glucose.: 155 mg/dL (24 Nov 2022 18:09)  POCT Blood Glucose.: 156 mg/dL (24 Nov 2022 17:13)  POCT Blood Glucose.: 175 mg/dL (24 Nov 2022 15:56)  POCT Blood Glucose.: 193 mg/dL (24 Nov 2022 14:44)  POCT Blood Glucose.: 186 mg/dL (24 Nov 2022 14:00)  POCT Blood Glucose.: 187 mg/dL (24 Nov 2022 13:02)  POCT Blood Glucose.: 158 mg/dL (24 Nov 2022 11:41)  POCT Blood Glucose.: 126 mg/dL (24 Nov 2022 10:58)  POCT Blood Glucose.: 118 mg/dL (24 Nov 2022 09:44)  POCT Blood Glucose.: 109 mg/dL (24 Nov 2022 08:43)  POCT Blood Glucose.: 110 mg/dL (24 Nov 2022 07:37)  POCT Blood Glucose.: 108 mg/dL (24 Nov 2022 06:21)  POCT Blood Glucose.: 115 mg/dL (24 Nov 2022 05:08)  POCT Blood Glucose.: 102 mg/dL (24 Nov 2022 03:55)           RADIOLOGY: - Reviewed and analyzed   CXR: pending    HOSPITAL MEDICATIONS: All medications reviewed and analyzed  MEDICATIONS  (STANDING):  amLODIPine   Tablet 5 milliGRAM(s) Oral daily  ascorbic acid 500 milliGRAM(s) Oral daily  aspirin enteric coated 81 milliGRAM(s) Oral daily  atorvastatin 80 milliGRAM(s) Oral at bedtime  cefuroxime  IVPB 1500 milliGRAM(s) IV Intermittent every 8 hours  chlorhexidine 2% Cloths 1 Application(s) Topical daily  clopidogrel Tablet 75 milliGRAM(s) Oral daily  dextrose 5%. 1000 milliLiter(s) (50 mL/Hr) IV Continuous <Continuous>  dextrose 5%. 1000 milliLiter(s) (100 mL/Hr) IV Continuous <Continuous>  dextrose 50% Injectable 50 milliLiter(s) IV Push every 15 minutes  dextrose 50% Injectable 25 milliLiter(s) IV Push every 15 minutes  ferrous    sulfate 325 milliGRAM(s) Oral daily  folic acid 1 milliGRAM(s) Oral daily  gabapentin 300 milliGRAM(s) Oral three times a day  glucagon  Injectable 1 milliGRAM(s) IntraMuscular once  insulin glargine Injectable (LANTUS) 12 Unit(s) SubCutaneous at bedtime  insulin lispro (ADMELOG) corrective regimen sliding scale   SubCutaneous three times a day before meals  insulin lispro Injectable (ADMELOG) 2 Unit(s) SubCutaneous three times a day before meals  lidocaine   4% Patch 1 Patch Transdermal daily  metoprolol tartrate 50 milliGRAM(s) Oral two times a day  niCARdipine Infusion 5 mG/Hr (25 mL/Hr) IV Continuous <Continuous>  pantoprazole    Tablet 40 milliGRAM(s) Oral before breakfast  polyethylene glycol 3350 17 Gram(s) Oral daily  potassium chloride  10 mEq/50 mL IVPB 10 milliEquivalent(s) IV Intermittent every 1 hour  potassium chloride  10 mEq/50 mL IVPB 10 milliEquivalent(s) IV Intermittent every 1 hour  potassium chloride  10 mEq/50 mL IVPB 10 milliEquivalent(s) IV Intermittent every 1 hour  senna 2 Tablet(s) Oral at bedtime  sodium chloride 0.9%. 1000 milliLiter(s) (10 mL/Hr) IV Continuous <Continuous>  sodium chloride 0.9%. 1000 milliLiter(s) (5 mL/Hr) IV Continuous <Continuous>  vancomycin  IVPB 1250 milliGRAM(s) IV Intermittent every 12 hours    MEDICATIONS  (PRN):  acetaminophen     Tablet .. 650 milliGRAM(s) Oral every 6 hours PRN Mild Pain (1 - 3)  dextrose Oral Gel 15 Gram(s) Oral once PRN Blood Glucose LESS THAN 70 milliGRAM(s)/deciliter  oxyCODONE    IR 5 milliGRAM(s) Oral every 4 hours PRN Moderate Pain (4 - 6)  oxyCODONE    IR 10 milliGRAM(s) Oral every 4 hours PRN Severe Pain (7 - 10)          Lines:     Physical Exam  Neuro: A+O x 3, non-focal, speech clear and intact  HEENT:  NCAT, PERRL, EOMI. No conjuctival edema or iIcterus, no thrush.  No ETT or NGT/OGT  Neck:  ROM intact, no JVD, no nodes or masses palpable, trachea midline, no tracheostomy  Pulm: CTA, good air entry, equal bilaterally, no rales/rhonchi/wheezing, no accessory muscle use noted  Chest:        mediastinal CT and left pleural CT all with dressings intact and no air leak, no subQ emphysema       +PW attatched to pacing box  CV: RRR, S1, S2. No murmurs, rubs, or gallops noted.  Abd: +BSx4. Soft, nontender, nondistended.  : onofre in situ to bedside drainage  Ext: BEACH x 4, no edema, no cyanosis or clubbing, distal motor/neuro/circ intact  Skin: warm, dry, no rashes                 47yMale with PMH     CABG, with saphenous vein graft        PAST MEDICAL & SURGICAL HISTORY:  HTN (hypertension)      DM (diabetes mellitus)

## 2022-11-25 NOTE — PROGRESS NOTE ADULT - SUBJECTIVE AND OBJECTIVE BOX
Interval Events:  no overnight events  follow up on diabetes    patient seen and examined at bedside.  FS running in 200s  not eating much  still in a lot of pain      REVIEW OF SYSTEMS:    CONSTITUTIONAL: No fever, weight loss, or fatigue  EYES: No eye pain, visual disturbances, or discharge  ENMT:  No difficulty hearing, tinnitus, vertigo; No sinus or throat pain  NECK: No pain or stiffness  RESPIRATORY: No cough, wheezing, chills or hemoptysis; No shortness of breath  CARDIOVASCULAR: No chest pain, palpitations, dizziness, or leg swelling  GASTROINTESTINAL: No abdominal or epigastric pain. No nausea, vomiting, or hematemesis; No diarrhea or constipation. No melena or hematochezia.  NEUROLOGICAL: No headaches, memory loss, loss of strength, numbness, or tremors  SKIN: No itching, burning, rashes, or lesions   MUSCULOSKELETAL: No joint pain or swelling; No muscle, back, or extremity pain  PSYCHIATRIC: No depression, anxiety, mood swings, or difficulty sleeping        Allergy Status Unknown  pork (Stomach Upset)      MEDICATIONS  (STANDING):  amLODIPine   Tablet 5 milliGRAM(s) Oral daily  ascorbic acid 500 milliGRAM(s) Oral daily  aspirin enteric coated 81 milliGRAM(s) Oral daily  atorvastatin 80 milliGRAM(s) Oral at bedtime  chlorhexidine 2% Cloths 1 Application(s) Topical daily  clopidogrel Tablet 75 milliGRAM(s) Oral daily  dextrose 5%. 1000 milliLiter(s) (50 mL/Hr) IV Continuous <Continuous>  dextrose 5%. 1000 milliLiter(s) (100 mL/Hr) IV Continuous <Continuous>  dextrose 50% Injectable 50 milliLiter(s) IV Push every 15 minutes  dextrose 50% Injectable 25 milliLiter(s) IV Push every 15 minutes  ferrous    sulfate 325 milliGRAM(s) Oral daily  folic acid 1 milliGRAM(s) Oral daily  gabapentin 300 milliGRAM(s) Oral three times a day  glucagon  Injectable 1 milliGRAM(s) IntraMuscular once  insulin glargine Injectable (LANTUS) 18 Unit(s) SubCutaneous at bedtime  insulin lispro (ADMELOG) corrective regimen sliding scale   SubCutaneous three times a day before meals  insulin lispro Injectable (ADMELOG) 3 Unit(s) SubCutaneous three times a day before meals  lidocaine   4% Patch 1 Patch Transdermal daily  metoprolol tartrate 50 milliGRAM(s) Oral two times a day  pantoprazole    Tablet 40 milliGRAM(s) Oral before breakfast  polyethylene glycol 3350 17 Gram(s) Oral daily  senna 2 Tablet(s) Oral at bedtime  sodium chloride 0.9% lock flush 3 milliLiter(s) IV Push every 8 hours  sodium chloride 0.9%. 1000 milliLiter(s) (10 mL/Hr) IV Continuous <Continuous>  sodium chloride 0.9%. 1000 milliLiter(s) (5 mL/Hr) IV Continuous <Continuous>  vancomycin  IVPB 1250 milliGRAM(s) IV Intermittent every 12 hours    MEDICATIONS  (PRN):  acetaminophen     Tablet .. 650 milliGRAM(s) Oral every 6 hours PRN Mild Pain (1 - 3)  dextrose Oral Gel 15 Gram(s) Oral once PRN Blood Glucose LESS THAN 70 milliGRAM(s)/deciliter  oxyCODONE    IR 5 milliGRAM(s) Oral every 4 hours PRN Moderate Pain (4 - 6)  oxyCODONE    IR 10 milliGRAM(s) Oral every 4 hours PRN Severe Pain (7 - 10)      Vital Signs Last 24 Hrs  T(C): 36.8 (25 Nov 2022 12:01), Max: 37.3 (25 Nov 2022 06:00)  T(F): 98.3 (25 Nov 2022 12:01), Max: 99.1 (25 Nov 2022 06:00)  HR: 64 (25 Nov 2022 12:00) (58 - 78)  BP: 111/66 (25 Nov 2022 12:00) (100/58 - 135/68)  BP(mean): 83 (25 Nov 2022 12:00) (71 - 93)  RR: 24 (25 Nov 2022 12:00) (10 - 38)  SpO2: 95% (25 Nov 2022 12:00) (90% - 98%)    Parameters below as of 25 Nov 2022 12:00  Patient On (Oxygen Delivery Method): room air      Weight (kg): 89.8 (11-19-22 @ 19:32)    Physical Exam:    Constitutional: NAD, well-developed  Respiratory: CTAB, normal respirations  Cardiovascular: S1 and S2, RRR, healing anterior chest wound  Gastrointestinal: BS+, soft, ntnd  Extremities: + peripheral edema  Neurological: AOx3, no focal deficits  Psychiatric: Normal mood and normal affect  Skin: no rashes, no acanthosis    LABS  11-25    138  |  102  |  16.0  ----------------------------<  190<H>  4.7   |  24.0  |  0.74    Ca    8.4      25 Nov 2022 02:15  Phos  2.6     11-23  Mg     1.9     11-25    TPro  5.5<L>  /  Alb  3.7  /  TBili  0.9  /  DBili  x   /  AST  32  /  ALT  14  /  AlkPhos  51  11-24                          8.8    18.13 )-----------( 190      ( 25 Nov 2022 02:15 )             26.5     Triglycerides, Serum: 114 mg/dL (11-23-22 @ 06:00)  HDL Cholesterol, Serum: 35 mg/dL (11-23-22 @ 06:00)  Cholesterol, Serum: 175 mg/dL (11-23-22 @ 06:00)    A1C with Estimated Average Glucose Result: 11.8 % (11-20-22 @ 19:47)    Aspartate Aminotransferase (AST/SGOT): 32 U/L (11-24-22 @ 01:30)  Alanine Aminotransferase (ALT/SGPT): 14 U/L (11-24-22 @ 01:30)  Alkaline Phosphatase, Serum: 51 U/L (11-24-22 @ 01:30)  Albumin, Serum: 3.7 g/dL (11-24-22 @ 01:30)  Albumin, Serum: 3.4 g/dL (11-23-22 @ 17:30)  Aspartate Aminotransferase (AST/SGOT): 26 U/L (11-23-22 @ 17:30)  Alanine Aminotransferase (ALT/SGPT): 13 U/L (11-23-22 @ 17:30)  Alkaline Phosphatase, Serum: 51 U/L (11-23-22 @ 17:30)          CAPILLARY BLOOD GLUCOSE      POCT Blood Glucose.: 234 mg/dL (25 Nov 2022 08:03)  POCT Blood Glucose.: 131 mg/dL (24 Nov 2022 21:59)  POCT Blood Glucose.: 134 mg/dL (24 Nov 2022 21:19)  POCT Blood Glucose.: 147 mg/dL (24 Nov 2022 20:10)  POCT Blood Glucose.: 164 mg/dL (24 Nov 2022 18:55)  POCT Blood Glucose.: 155 mg/dL (24 Nov 2022 18:09)  POCT Blood Glucose.: 156 mg/dL (24 Nov 2022 17:13)  POCT Blood Glucose.: 175 mg/dL (24 Nov 2022 15:56)  POCT Blood Glucose.: 193 mg/dL (24 Nov 2022 14:44)  POCT Blood Glucose.: 186 mg/dL (24 Nov 2022 14:00)   Interval Events:  no overnight events  follow up on diabetes    patient seen and examined at bedside.  FS running in 200s  not eating much  still in pain but better than yesterday  family at bedside      REVIEW OF SYSTEMS:    CONSTITUTIONAL: No fever, weight loss, or fatigue  EYES: No eye pain, visual disturbances, or discharge  ENMT:  No difficulty hearing, tinnitus, vertigo; No sinus or throat pain  NECK: No pain or stiffness  RESPIRATORY: No cough, wheezing, chills or hemoptysis; No shortness of breath  CARDIOVASCULAR: No chest pain, palpitations, dizziness, or leg swelling  GASTROINTESTINAL: No abdominal or epigastric pain. No nausea, vomiting, or hematemesis; No diarrhea or constipation. No melena or hematochezia.  NEUROLOGICAL: No headaches, memory loss, loss of strength, numbness, or tremors  SKIN: No itching, burning, rashes, or lesions   MUSCULOSKELETAL: No joint pain or swelling; No muscle, back, or extremity pain  PSYCHIATRIC: No depression, anxiety, mood swings, or difficulty sleeping        Allergy Status Unknown  pork (Stomach Upset)      MEDICATIONS  (STANDING):  amLODIPine   Tablet 5 milliGRAM(s) Oral daily  ascorbic acid 500 milliGRAM(s) Oral daily  aspirin enteric coated 81 milliGRAM(s) Oral daily  atorvastatin 80 milliGRAM(s) Oral at bedtime  chlorhexidine 2% Cloths 1 Application(s) Topical daily  clopidogrel Tablet 75 milliGRAM(s) Oral daily  dextrose 5%. 1000 milliLiter(s) (50 mL/Hr) IV Continuous <Continuous>  dextrose 5%. 1000 milliLiter(s) (100 mL/Hr) IV Continuous <Continuous>  dextrose 50% Injectable 50 milliLiter(s) IV Push every 15 minutes  dextrose 50% Injectable 25 milliLiter(s) IV Push every 15 minutes  ferrous    sulfate 325 milliGRAM(s) Oral daily  folic acid 1 milliGRAM(s) Oral daily  gabapentin 300 milliGRAM(s) Oral three times a day  glucagon  Injectable 1 milliGRAM(s) IntraMuscular once  insulin glargine Injectable (LANTUS) 18 Unit(s) SubCutaneous at bedtime  insulin lispro (ADMELOG) corrective regimen sliding scale   SubCutaneous three times a day before meals  insulin lispro Injectable (ADMELOG) 3 Unit(s) SubCutaneous three times a day before meals  lidocaine   4% Patch 1 Patch Transdermal daily  metoprolol tartrate 50 milliGRAM(s) Oral two times a day  pantoprazole    Tablet 40 milliGRAM(s) Oral before breakfast  polyethylene glycol 3350 17 Gram(s) Oral daily  senna 2 Tablet(s) Oral at bedtime  sodium chloride 0.9% lock flush 3 milliLiter(s) IV Push every 8 hours  sodium chloride 0.9%. 1000 milliLiter(s) (10 mL/Hr) IV Continuous <Continuous>  sodium chloride 0.9%. 1000 milliLiter(s) (5 mL/Hr) IV Continuous <Continuous>  vancomycin  IVPB 1250 milliGRAM(s) IV Intermittent every 12 hours    MEDICATIONS  (PRN):  acetaminophen     Tablet .. 650 milliGRAM(s) Oral every 6 hours PRN Mild Pain (1 - 3)  dextrose Oral Gel 15 Gram(s) Oral once PRN Blood Glucose LESS THAN 70 milliGRAM(s)/deciliter  oxyCODONE    IR 5 milliGRAM(s) Oral every 4 hours PRN Moderate Pain (4 - 6)  oxyCODONE    IR 10 milliGRAM(s) Oral every 4 hours PRN Severe Pain (7 - 10)      Vital Signs Last 24 Hrs  T(C): 36.8 (25 Nov 2022 12:01), Max: 37.3 (25 Nov 2022 06:00)  T(F): 98.3 (25 Nov 2022 12:01), Max: 99.1 (25 Nov 2022 06:00)  HR: 64 (25 Nov 2022 12:00) (58 - 78)  BP: 111/66 (25 Nov 2022 12:00) (100/58 - 135/68)  BP(mean): 83 (25 Nov 2022 12:00) (71 - 93)  RR: 24 (25 Nov 2022 12:00) (10 - 38)  SpO2: 95% (25 Nov 2022 12:00) (90% - 98%)    Parameters below as of 25 Nov 2022 12:00  Patient On (Oxygen Delivery Method): room air      Weight (kg): 89.8 (11-19-22 @ 19:32)    Physical Exam:    Constitutional: NAD, well-developed  Respiratory: CTAB, normal respirations  Cardiovascular: S1 and S2, RRR, healing anterior chest wound  Gastrointestinal: BS+, soft, ntnd  Extremities: + peripheral edema  Neurological: AOx3, no focal deficits  Psychiatric: Normal mood and normal affect  Skin: no rashes, no acanthosis    LABS  11-25    138  |  102  |  16.0  ----------------------------<  190<H>  4.7   |  24.0  |  0.74    Ca    8.4      25 Nov 2022 02:15  Phos  2.6     11-23  Mg     1.9     11-25    TPro  5.5<L>  /  Alb  3.7  /  TBili  0.9  /  DBili  x   /  AST  32  /  ALT  14  /  AlkPhos  51  11-24                          8.8    18.13 )-----------( 190      ( 25 Nov 2022 02:15 )             26.5     Triglycerides, Serum: 114 mg/dL (11-23-22 @ 06:00)  HDL Cholesterol, Serum: 35 mg/dL (11-23-22 @ 06:00)  Cholesterol, Serum: 175 mg/dL (11-23-22 @ 06:00)    A1C with Estimated Average Glucose Result: 11.8 % (11-20-22 @ 19:47)    Aspartate Aminotransferase (AST/SGOT): 32 U/L (11-24-22 @ 01:30)  Alanine Aminotransferase (ALT/SGPT): 14 U/L (11-24-22 @ 01:30)  Alkaline Phosphatase, Serum: 51 U/L (11-24-22 @ 01:30)  Albumin, Serum: 3.7 g/dL (11-24-22 @ 01:30)  Albumin, Serum: 3.4 g/dL (11-23-22 @ 17:30)  Aspartate Aminotransferase (AST/SGOT): 26 U/L (11-23-22 @ 17:30)  Alanine Aminotransferase (ALT/SGPT): 13 U/L (11-23-22 @ 17:30)  Alkaline Phosphatase, Serum: 51 U/L (11-23-22 @ 17:30)          CAPILLARY BLOOD GLUCOSE      POCT Blood Glucose.: 234 mg/dL (25 Nov 2022 08:03)  POCT Blood Glucose.: 131 mg/dL (24 Nov 2022 21:59)  POCT Blood Glucose.: 134 mg/dL (24 Nov 2022 21:19)  POCT Blood Glucose.: 147 mg/dL (24 Nov 2022 20:10)  POCT Blood Glucose.: 164 mg/dL (24 Nov 2022 18:55)  POCT Blood Glucose.: 155 mg/dL (24 Nov 2022 18:09)  POCT Blood Glucose.: 156 mg/dL (24 Nov 2022 17:13)  POCT Blood Glucose.: 175 mg/dL (24 Nov 2022 15:56)  POCT Blood Glucose.: 193 mg/dL (24 Nov 2022 14:44)  POCT Blood Glucose.: 186 mg/dL (24 Nov 2022 14:00)

## 2022-11-25 NOTE — PROGRESS NOTE ADULT - ASSESSMENT
47 yr old male smoker, hypertension, diabetes mellitus presented initially with complaints of chest pain that started on Saturday. States he had mid sternal chest pain about 3 episodes, on and off, pressure like, radiating to both arms. He was admitted to observation and had an abnormal cardiac CT and hence being admitted for LHC. Per chart, patient eats junk food, smokes heavily and works late hours.  S/p LHC 11/22 with WENDY x 2 to LCx.  Pt was planned for staged procedure today for pRamus 95%.  Patient now s/p LHC demonstrating dissection of the left main. IABP placed for support. CTS consulted for CABG eval.  11/23 Dissection of LAD s/p LCx PCI, R-IABP insertion à Emergent C3V (SVG-LAD, SVG-OM, SVG-Tonia)  pt remains on low dose levophed for BP support. Pt volume resuscitated for hypovolemic shock. Acute blood loss anemia requiring blood transfusion.  11/24 IABP removed, pt hypertensive +/- cardene gtt  11/25  IVP labetalol administered for acute HTN, cardene added back, lopressor uptitrated and po Norvasc added

## 2022-11-25 NOTE — PROGRESS NOTE ADULT - PROBLEM SELECTOR PLAN 1
CCTA score 30 for LAD, high grade stenosis in Ramus  -ECHO: 60-65%  -Trop neg  -S/p LHC with WENDY x 2 to LCX. pRamus with 95% (found to have thrombus, s/p integrelin gtt). For staged PCI. Course complicated by LM dissection. Emergent CABG 3v. Weaned of pressors  -GDMT: atorvastatin, ASA, Plavix, beta-blockers, amlodipine    -Aggressive cardiovascular risk reduction and lifestyle modification.

## 2022-11-25 NOTE — PROGRESS NOTE ADULT - PROBLEM SELECTOR PLAN 1
- Pain control with oxy ir and tylenol prn  - Encourage DBE/IS, wean NC as tolerated   - Daily CXR  - consider lopressor in AM, hold for SBP less than 100 or HR less than 60    - Asa therapy for graft patency  - Statin therapy for chronic graft occlusion ppx   - Protonix for GI ppx  - Strict i/o's  - strict glucose control, wean insulin gtt as per CT ICU protocol and consult endocrine   - Chemical DVT ppx with lovenox, maintain SCD's for mechanical DVT ppx

## 2022-11-25 NOTE — PROGRESS NOTE ADULT - PROBLEM SELECTOR PLAN 2
IVP labetalol administered for acute HTN,   cardene added back, lopressor up titrated and po Norvasc added.  Management  per ICU t IVP labetalol administered for acute HTN,   cardene added back, lopressor up titrated and po Norvasc added.  Management  per ICU     No further inpatient cardiac work up at this time.  Will sign off.  Please reconsult if needed.

## 2022-11-25 NOTE — PROGRESS NOTE ADULT - ASSESSMENT
47M smoker w/ HTN, T2DM presented initially with complaints of sternal chest pain, radiating to both arms. Per brother at bedside, patient eats junk food, smokes heavily and works late hours. He was admitted to observation and had an abnormal cardiac CT and hence being admitted for [facility]. Course cb LM dissection requiring emergent CABG.  Consult for diabetes mgmt. A1c 11.8    Uncontrolled T2DM- hyperglycemic related to not eating properly and noncompliance with meds. FS reasonable at this point  -A1c 11.8  -check sugars AC and bedtime  -ensure diabetic diet  -agree with lantus 18 units bedtime  -okay to add admelog 3units TID  -continue with sliding scale insulin  -needs to be seen by nutrition and CDE for insulin/meter teach  -might be able to be dced on orals vs basal insulin with orals, to be determined depending on how much he is on as an inpatient    CAD- s/p 2DES to LCx which was complicated by LM dissection. now s/p 3VCABG, care per cardiology    HLD- continue statin

## 2022-11-25 NOTE — PROGRESS NOTE ADULT - ASSESSMENT
48 y/o male with hx of HTN, DM2, current smoker who presents with chest pain. Pain is midsternal, nonradiating but associated with some b/l hand discomfort. 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 Pt received  mg in ED. He was found hypertensive 221/103 and was treated with Labetalol and Hydralazine IV. Troponin negative x1, proBNP normal. EKG shows SR with nonspecific T-wave abnormality. Pt does not have a cardiologist and denies any prior cardiac issues.    WENDY x 2 LCX  LM dissection -> CABG 3v   46 y/o male with hx of HTN, DM2, current smoker who presents with chest pain. Pain is midsternal, nonradiating but associated with some b/l hand discomfort. 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 Pt received  mg in ED. He was found hypertensive 221/103 and was treated with Labetalol and Hydralazine IV. Troponin negative x1, proBNP normal. EKG shows SR with nonspecific T-wave abnormality. Pt does not have a cardiologist and denies any prior cardiac issues. WENDY x 2 LCX  LM dissection -> CABG 3v.   No further inpatient cardiac work up at this time. Will sign off. Please reconsult if needed.

## 2022-11-26 DIAGNOSIS — Z29.9 ENCOUNTER FOR PROPHYLACTIC MEASURES, UNSPECIFIED: ICD-10-CM

## 2022-11-26 DIAGNOSIS — E11.9 TYPE 2 DIABETES MELLITUS WITHOUT COMPLICATIONS: ICD-10-CM

## 2022-11-26 LAB
ANION GAP SERPL CALC-SCNC: 8 MMOL/L — SIGNIFICANT CHANGE UP (ref 5–17)
ANION GAP SERPL CALC-SCNC: 9 MMOL/L — SIGNIFICANT CHANGE UP (ref 5–17)
BUN SERPL-MCNC: 20.4 MG/DL — HIGH (ref 8–20)
BUN SERPL-MCNC: 21 MG/DL — HIGH (ref 8–20)
CALCIUM SERPL-MCNC: 8.4 MG/DL — SIGNIFICANT CHANGE UP (ref 8.4–10.5)
CALCIUM SERPL-MCNC: 8.4 MG/DL — SIGNIFICANT CHANGE UP (ref 8.4–10.5)
CHLORIDE SERPL-SCNC: 101 MMOL/L — SIGNIFICANT CHANGE UP (ref 96–108)
CHLORIDE SERPL-SCNC: 99 MMOL/L — SIGNIFICANT CHANGE UP (ref 96–108)
CO2 SERPL-SCNC: 28 MMOL/L — SIGNIFICANT CHANGE UP (ref 22–29)
CO2 SERPL-SCNC: 29 MMOL/L — SIGNIFICANT CHANGE UP (ref 22–29)
CREAT SERPL-MCNC: 0.73 MG/DL — SIGNIFICANT CHANGE UP (ref 0.5–1.3)
CREAT SERPL-MCNC: 0.74 MG/DL — SIGNIFICANT CHANGE UP (ref 0.5–1.3)
EGFR: 113 ML/MIN/1.73M2 — SIGNIFICANT CHANGE UP
EGFR: 114 ML/MIN/1.73M2 — SIGNIFICANT CHANGE UP
GLUCOSE BLDC GLUCOMTR-MCNC: 166 MG/DL — HIGH (ref 70–99)
GLUCOSE BLDC GLUCOMTR-MCNC: 180 MG/DL — HIGH (ref 70–99)
GLUCOSE BLDC GLUCOMTR-MCNC: 193 MG/DL — HIGH (ref 70–99)
GLUCOSE BLDC GLUCOMTR-MCNC: 209 MG/DL — HIGH (ref 70–99)
GLUCOSE SERPL-MCNC: 175 MG/DL — HIGH (ref 70–99)
GLUCOSE SERPL-MCNC: 206 MG/DL — HIGH (ref 70–99)
HCT VFR BLD CALC: 30.2 % — LOW (ref 39–50)
HGB BLD-MCNC: 9.8 G/DL — LOW (ref 13–17)
MAGNESIUM SERPL-MCNC: 2.1 MG/DL — SIGNIFICANT CHANGE UP (ref 1.6–2.6)
MCHC RBC-ENTMCNC: 28.7 PG — SIGNIFICANT CHANGE UP (ref 27–34)
MCHC RBC-ENTMCNC: 32.5 GM/DL — SIGNIFICANT CHANGE UP (ref 32–36)
MCV RBC AUTO: 88.6 FL — SIGNIFICANT CHANGE UP (ref 80–100)
PLATELET # BLD AUTO: 208 K/UL — SIGNIFICANT CHANGE UP (ref 150–400)
POTASSIUM SERPL-MCNC: 4.1 MMOL/L — SIGNIFICANT CHANGE UP (ref 3.5–5.3)
POTASSIUM SERPL-MCNC: 4.8 MMOL/L — SIGNIFICANT CHANGE UP (ref 3.5–5.3)
POTASSIUM SERPL-SCNC: 4.1 MMOL/L — SIGNIFICANT CHANGE UP (ref 3.5–5.3)
POTASSIUM SERPL-SCNC: 4.8 MMOL/L — SIGNIFICANT CHANGE UP (ref 3.5–5.3)
RBC # BLD: 3.41 M/UL — LOW (ref 4.2–5.8)
RBC # FLD: 13.5 % — SIGNIFICANT CHANGE UP (ref 10.3–14.5)
SODIUM SERPL-SCNC: 135 MMOL/L — SIGNIFICANT CHANGE UP (ref 135–145)
SODIUM SERPL-SCNC: 139 MMOL/L — SIGNIFICANT CHANGE UP (ref 135–145)
WBC # BLD: 19.21 K/UL — HIGH (ref 3.8–10.5)
WBC # FLD AUTO: 19.21 K/UL — HIGH (ref 3.8–10.5)

## 2022-11-26 PROCEDURE — 99232 SBSQ HOSP IP/OBS MODERATE 35: CPT

## 2022-11-26 PROCEDURE — 71045 X-RAY EXAM CHEST 1 VIEW: CPT | Mod: 26

## 2022-11-26 RX ORDER — METOPROLOL TARTRATE 50 MG
50 TABLET ORAL THREE TIMES A DAY
Refills: 0 | Status: DISCONTINUED | OUTPATIENT
Start: 2022-11-26 | End: 2022-11-27

## 2022-11-26 RX ORDER — METOPROLOL TARTRATE 50 MG
50 TABLET ORAL ONCE
Refills: 0 | Status: COMPLETED | OUTPATIENT
Start: 2022-11-26 | End: 2022-11-26

## 2022-11-26 RX ORDER — MAGNESIUM SULFATE 500 MG/ML
2 VIAL (ML) INJECTION ONCE
Refills: 0 | Status: COMPLETED | OUTPATIENT
Start: 2022-11-26 | End: 2022-11-26

## 2022-11-26 RX ADMIN — Medication 50 MILLIGRAM(S): at 22:17

## 2022-11-26 RX ADMIN — GABAPENTIN 300 MILLIGRAM(S): 400 CAPSULE ORAL at 12:46

## 2022-11-26 RX ADMIN — SENNA PLUS 2 TABLET(S): 8.6 TABLET ORAL at 22:16

## 2022-11-26 RX ADMIN — Medication 500 MILLIGRAM(S): at 09:50

## 2022-11-26 RX ADMIN — ENOXAPARIN SODIUM 40 MILLIGRAM(S): 100 INJECTION SUBCUTANEOUS at 22:16

## 2022-11-26 RX ADMIN — Medication 325 MILLIGRAM(S): at 09:51

## 2022-11-26 RX ADMIN — Medication 50 MILLIGRAM(S): at 05:39

## 2022-11-26 RX ADMIN — OXYCODONE HYDROCHLORIDE 10 MILLIGRAM(S): 5 TABLET ORAL at 18:10

## 2022-11-26 RX ADMIN — Medication 650 MILLIGRAM(S): at 06:32

## 2022-11-26 RX ADMIN — SODIUM CHLORIDE 3 MILLILITER(S): 9 INJECTION INTRAMUSCULAR; INTRAVENOUS; SUBCUTANEOUS at 14:29

## 2022-11-26 RX ADMIN — Medication 3 UNIT(S): at 12:43

## 2022-11-26 RX ADMIN — POLYETHYLENE GLYCOL 3350 17 GRAM(S): 17 POWDER, FOR SOLUTION ORAL at 09:51

## 2022-11-26 RX ADMIN — PANTOPRAZOLE SODIUM 40 MILLIGRAM(S): 20 TABLET, DELAYED RELEASE ORAL at 06:32

## 2022-11-26 RX ADMIN — Medication 2: at 22:17

## 2022-11-26 RX ADMIN — Medication 4: at 12:43

## 2022-11-26 RX ADMIN — ATORVASTATIN CALCIUM 80 MILLIGRAM(S): 80 TABLET, FILM COATED ORAL at 22:16

## 2022-11-26 RX ADMIN — Medication 25 GRAM(S): at 14:36

## 2022-11-26 RX ADMIN — INSULIN GLARGINE 18 UNIT(S): 100 INJECTION, SOLUTION SUBCUTANEOUS at 22:17

## 2022-11-26 RX ADMIN — Medication 650 MILLIGRAM(S): at 00:20

## 2022-11-26 RX ADMIN — Medication 650 MILLIGRAM(S): at 01:20

## 2022-11-26 RX ADMIN — Medication 3 UNIT(S): at 09:52

## 2022-11-26 RX ADMIN — OXYCODONE HYDROCHLORIDE 10 MILLIGRAM(S): 5 TABLET ORAL at 09:10

## 2022-11-26 RX ADMIN — CLOPIDOGREL BISULFATE 75 MILLIGRAM(S): 75 TABLET, FILM COATED ORAL at 09:50

## 2022-11-26 RX ADMIN — Medication 50 MILLIGRAM(S): at 17:23

## 2022-11-26 RX ADMIN — GABAPENTIN 300 MILLIGRAM(S): 400 CAPSULE ORAL at 22:16

## 2022-11-26 RX ADMIN — OXYCODONE HYDROCHLORIDE 10 MILLIGRAM(S): 5 TABLET ORAL at 07:18

## 2022-11-26 RX ADMIN — Medication 1 MILLIGRAM(S): at 09:50

## 2022-11-26 RX ADMIN — SODIUM CHLORIDE 3 MILLILITER(S): 9 INJECTION INTRAMUSCULAR; INTRAVENOUS; SUBCUTANEOUS at 22:15

## 2022-11-26 RX ADMIN — LIDOCAINE 1 PATCH: 4 CREAM TOPICAL at 09:51

## 2022-11-26 RX ADMIN — OXYCODONE HYDROCHLORIDE 10 MILLIGRAM(S): 5 TABLET ORAL at 17:18

## 2022-11-26 RX ADMIN — SODIUM CHLORIDE 3 MILLILITER(S): 9 INJECTION INTRAMUSCULAR; INTRAVENOUS; SUBCUTANEOUS at 05:41

## 2022-11-26 RX ADMIN — Medication 81 MILLIGRAM(S): at 09:50

## 2022-11-26 RX ADMIN — LIDOCAINE 1 PATCH: 4 CREAM TOPICAL at 21:00

## 2022-11-26 RX ADMIN — Medication 2: at 17:15

## 2022-11-26 RX ADMIN — AMLODIPINE BESYLATE 5 MILLIGRAM(S): 2.5 TABLET ORAL at 05:40

## 2022-11-26 RX ADMIN — GABAPENTIN 300 MILLIGRAM(S): 400 CAPSULE ORAL at 05:38

## 2022-11-26 RX ADMIN — LIDOCAINE 1 PATCH: 4 CREAM TOPICAL at 19:00

## 2022-11-26 RX ADMIN — Medication 2: at 09:53

## 2022-11-26 RX ADMIN — Medication 3 UNIT(S): at 17:15

## 2022-11-26 NOTE — PROGRESS NOTE ADULT - SUBJECTIVE AND OBJECTIVE BOX
POD #3 s/p emergent CABG X 3 (SVG-LAD, SVG-OM, SVG-Diag)    PAST MEDICAL & SURGICAL HISTORY:  HTN (hypertension)  DM (diabetes mellitus)    FAMILY HISTORY:  FH: heart disease (Father)    Brief Hospital Course: 47 year old male every day smoker with a medical history of HTN, type 2 DM (HA1c 11.8 on Metformin), presented initially with complaints of intermittent midsternal chest pain 11/19/22, noted to have an abnormal cardiac CT, s/p LHC 11/22 with WENDY x 2 to LCx, with plan for staged PCI 11/23 for pRamus 95%, but during LHC found dissection of the left main. IABP placed 11/23 for support and patient underwent emergent CABG X 3 (SVG-LAD, SVG-OM, SVG-Diag) on 11/23/22 with Dr. Hillman. Postoperative course significant for volume resuscitation for hypovolemic shock, pressor requirement for hypotension (now weaned off and requiring Lopressor and Norvasc for HTN), and ABLA (s/p blood transfusion).    Significant recent/past 24 hr events: No overnight events reported.    Subjective: Patient lying in bed in NAD. +Tolerating diet. +Passing gas. +Pain currently controlled. Denies fevers, chills, lightheadedness, dizziness, HA, CP, palpitations, SOB, cough, abdominal pain, N/V, diarrhea, numbness/tingling in extremities, or any other acute complaints. ROS negative x 10 systems except as noted above.    MEDICATIONS  (STANDING):  amLODIPine   Tablet 5 milliGRAM(s) Oral daily  ascorbic acid 500 milliGRAM(s) Oral daily  aspirin enteric coated 81 milliGRAM(s) Oral daily  atorvastatin 80 milliGRAM(s) Oral at bedtime  clopidogrel Tablet 75 milliGRAM(s) Oral daily  enoxaparin Injectable 40 milliGRAM(s) SubCutaneous every 24 hours  ferrous    sulfate 325 milliGRAM(s) Oral daily  folic acid 1 milliGRAM(s) Oral daily  gabapentin 300 milliGRAM(s) Oral three times a day  insulin glargine Injectable (LANTUS) 18 Unit(s) SubCutaneous at bedtime  insulin lispro (ADMELOG) corrective regimen sliding scale   SubCutaneous Before meals and at bedtime  insulin lispro Injectable (ADMELOG) 3 Unit(s) SubCutaneous three times a day before meals  lidocaine   4% Patch 1 Patch Transdermal daily  metoprolol tartrate 50 milliGRAM(s) Oral two times a day  pantoprazole    Tablet 40 milliGRAM(s) Oral before breakfast  polyethylene glycol 3350 17 Gram(s) Oral daily  senna 2 Tablet(s) Oral at bedtime  sodium chloride 0.9% lock flush 3 milliLiter(s) IV Push every 8 hours    MEDICATIONS  (PRN):  acetaminophen     Tablet .. 650 milliGRAM(s) Oral every 6 hours PRN Mild Pain (1 - 3)  dextrose Oral Gel 15 Gram(s) Oral once PRN Blood Glucose LESS THAN 70 milliGRAM(s)/deciliter  oxyCODONE    IR 10 milliGRAM(s) Oral every 4 hours PRN Severe Pain (7 - 10)  oxyCODONE    IR 5 milliGRAM(s) Oral every 4 hours PRN Moderate Pain (4 - 6)    Allergies:  Allergy Status Unknown  pork (Stomach Upset)    Vitals   T(C): 37.1 (26 Nov 2022 00:00), Max: 37.3 (25 Nov 2022 06:00)  T(F): 98.7 (26 Nov 2022 00:00), Max: 99.1 (25 Nov 2022 06:00)  HR: 73 (26 Nov 2022 00:00) (61 - 73)  BP: 130/76 (26 Nov 2022 00:00) (100/58 - 135/76)  BP(mean): 96 (25 Nov 2022 15:21) (71 - 97)  ABP: 120/55 (25 Nov 2022 09:00) (100/50 - 150/71)  ABP(mean): 75 (25 Nov 2022 09:00) (66 - 90)  RR: 18 (26 Nov 2022 00:00) (10 - 38)  SpO2: 99% (26 Nov 2022 00:00) (90% - 100%)  O2 Parameters below as of 26 Nov 2022 00:00  Patient On (Oxygen Delivery Method): room air    I&O's Detail    24 Nov 2022 07:01  -  25 Nov 2022 07:00  --------------------------------------------------------  IN:    Insulin: 23 mL    NiCARdipine: 50 mL    Oral Fluid: 720 mL    sodium chloride 0.9%: 230 mL    sodium chloride 0.9%: 115 mL  Total IN: 1138 mL    OUT:    Chest Tube (mL): 190 mL    Chest Tube (mL): 220 mL    Chest Tube (mL): 50 mL    Indwelling Catheter - Urethral (mL): 2520 mL  Total OUT: 2980 mL    Total NET: -1842 mL      25 Nov 2022 07:01  -  26 Nov 2022 02:13  --------------------------------------------------------  IN:    IV PiggyBack: 50 mL    IV PiggyBack: 500 mL    IV PiggyBack: 100 mL    NiCARdipine: 12.5 mL    Oral Fluid: 580 mL    sodium chloride 0.9%: 20 mL    sodium chloride 0.9%: 15 mL  Total IN: 1277.5 mL    OUT:    Chest Tube (mL): 20 mL    Chest Tube (mL): 0 mL    Chest Tube (mL): 100 mL    Indwelling Catheter - Urethral (mL): 160 mL    Voided (mL): 800 mL  Total OUT: 1080 mL    Total NET: 197.5 mL    Physical Exam  Neuro: A+O x 3, non-focal, speech clear and intact  HEENT:  NCAT, No conjuctival edema or icterus, no thrush.    Neck:  Supple, trachea midline  Pulm: +BSs diminished bilaterally, no accessory muscle use noted  Chest:        mediastinal CT and left pleural CT all with dressings intact and no air leak, no subQ emphysema       +PW (settings: DDD, rate: 40, mA:10, 10 , sensitivity: 0.8)  CV: regular rate, regular rhythm, +S1S2, no murmur noted  Abd: soft, NT, ND, + BS  Ext: BEACH x 4, +1 LE edema b/l, no cyanosis, distal motor/neuro/circ intact  Skin: warm, dry, perfused  Incisions: midsternal mepilex C/D/I, sternum stable, B/l LE C/D/I w/ dressing and Ace wrap    LABS                        8.8    18.13 )-----------( 190      ( 25 Nov 2022 02:15 )             26.5     11-25    138  |  102  |  16.0  ----------------------------<  190<H>  4.7   |  24.0  |  0.74    Ca    8.4      25 Nov 2022 02:15  Mg     1.9     11-25    POCT Blood Glucose.: 173 mg/dL (11-25-22 @ 21:08)  POCT Blood Glucose.: 213 mg/dL (11-25-22 @ 17:00)  POCT Blood Glucose.: 220 mg/dL (11-25-22 @ 14:03)  POCT Blood Glucose.: 234 mg/dL (11-25-22 @ 08:03)      Last CXR:  < from: Xray Chest 1 View- PORTABLE-Routine (11.25.22 @ 06:19) >  Chest one view 11/25/2022 4:53 AM  Compared to the prior study, one mediastinal drain remains present. Trace left apical pneumothorax is present.  IMPRESSION:  Trace left pneumothorax.  < end of copied text >

## 2022-11-26 NOTE — PROGRESS NOTE ADULT - PROBLEM SELECTOR PLAN 1
S/p emergent CABG X 3 (SVG-LAD, SVG-OM, SVG-Diag) on 11/23/22 with Dr. Hillman.   IABP removed 11/24. Continue to monitor groins.   Trending H/H for postop ABLA.   Neurologically intact.  Hemodynamically stable.  Continue Lopressor as tolerated by HR and BP.   Continue Norvasc for HTN.   Continue aspirin and Plavix for acute graft closure prophylaxis.  Continue statin for chronic graft patency prophylaxis.  Oxygenating well, wean FiO2 as tolerated, coughing and deep breathing exercises/incentive spirometry encouraged.  D/c CTs this morning if output remains minimal.   Monitor need for diuresis. Monitor I/Os, replenish electrolytes PRN to keep K>4 and Mg>2.   Continue with PT, increase activity as tolerated.  FS AC+HS with coverage for glycemic control.  Tylenol, Oxy PRN for analgesia.  Continue bowel regimen PRN constipation.   Case and plan to be reviewed / discussed with CT Surgery attending / team during AM rounds.

## 2022-11-26 NOTE — PROGRESS NOTE ADULT - ASSESSMENT
47 year old male every day smoker with a medical history of HTN, type 2 DM (HA1c 11.8 on Metformin), presented initially with complaints of intermittent midsternal chest pain 11/19/22, noted to have an abnormal cardiac CT, s/p LHC 11/22 with WENDY x 2 to LCx, with plan for staged PCI 11/23 for pRamus 95%, but during LHC found dissection of the left main. IABP placed 11/23 for support and patient underwent emergent CABG X 3 (SVG-LAD, SVG-OM, SVG-Diag) on 11/23/22 with Dr. Hillman. Postoperative course significant for volume resuscitation for hypovolemic shock, pressor requirement for hypotension (now weaned off and requiring Lopressor and Norvasc for HTN), and ABLA (s/p blood transfusion).

## 2022-11-26 NOTE — PROGRESS NOTE ADULT - ASSESSMENT
47M smoker w/ HTN, T2DM presented initially with complaints of sternal chest pain, radiating to both arms. Per brother at bedside, patient eats junk food, smokes heavily and works late hours. He was admitted to observation and had an abnormal cardiac CT and hence being admitted for [facility]. Course cb LM dissection requiring emergent CABG.  Consult for diabetes mgmt. A1c 11.8    Uncontrolled T2DM- hyperglycemic related to not eating properly and noncompliance with meds. FS reasonable at this point  -A1c 11.8  -check sugars AC and bedtime  -ensure diabetic diet  -increase to lantus 26 units bedtime  -increase to admelog 8units TID  -continue with sliding scale insulin  -needs to be seen by nutrition and CDE for insulin/meter teach  -likely will need insulin on discharge    CAD- s/p 2DES to LCx which was complicated by LM dissection. now s/p 3VCABG, care per cardiology    HLD- continue statin

## 2022-11-26 NOTE — PROGRESS NOTE ADULT - SUBJECTIVE AND OBJECTIVE BOX
Interval Events:  no overnight events  follow up on diabetes    patient seen and examined at bedside.  sleeping in chair  feels okay  has pain  FS in 200s    REVIEW OF SYSTEMS:    CONSTITUTIONAL: No fever, weight loss, or fatigue  EYES: No eye pain, visual disturbances, or discharge  ENMT:  No difficulty hearing, tinnitus, vertigo; No sinus or throat pain  NECK: No pain or stiffness  RESPIRATORY: No cough, wheezing, chills or hemoptysis; No shortness of breath  CARDIOVASCULAR: No chest pain, palpitations, dizziness, or leg swelling  GASTROINTESTINAL: No abdominal or epigastric pain. No nausea, vomiting, or hematemesis; No diarrhea or constipation. No melena or hematochezia.  NEUROLOGICAL: No headaches, memory loss, loss of strength, numbness, or tremors  SKIN: No itching, burning, rashes, or lesions   MUSCULOSKELETAL: No joint pain or swelling; No muscle, back, or extremity pain  PSYCHIATRIC: No depression, anxiety, mood swings, or difficulty sleeping        Allergy Status Unknown  pork (Stomach Upset)      MEDICATIONS  (STANDING):  amLODIPine   Tablet 5 milliGRAM(s) Oral daily  ascorbic acid 500 milliGRAM(s) Oral daily  aspirin enteric coated 81 milliGRAM(s) Oral daily  atorvastatin 80 milliGRAM(s) Oral at bedtime  clopidogrel Tablet 75 milliGRAM(s) Oral daily  dextrose 5%. 1000 milliLiter(s) (50 mL/Hr) IV Continuous <Continuous>  dextrose 5%. 1000 milliLiter(s) (100 mL/Hr) IV Continuous <Continuous>  dextrose 50% Injectable 50 milliLiter(s) IV Push every 15 minutes  dextrose 50% Injectable 25 milliLiter(s) IV Push every 15 minutes  enoxaparin Injectable 40 milliGRAM(s) SubCutaneous every 24 hours  ferrous    sulfate 325 milliGRAM(s) Oral daily  folic acid 1 milliGRAM(s) Oral daily  gabapentin 300 milliGRAM(s) Oral three times a day  glucagon  Injectable 1 milliGRAM(s) IntraMuscular once  insulin glargine Injectable (LANTUS) 18 Unit(s) SubCutaneous at bedtime  insulin lispro (ADMELOG) corrective regimen sliding scale   SubCutaneous Before meals and at bedtime  insulin lispro Injectable (ADMELOG) 3 Unit(s) SubCutaneous three times a day before meals  lidocaine   4% Patch 1 Patch Transdermal daily  metoprolol tartrate 50 milliGRAM(s) Oral three times a day  pantoprazole    Tablet 40 milliGRAM(s) Oral before breakfast  polyethylene glycol 3350 17 Gram(s) Oral daily  senna 2 Tablet(s) Oral at bedtime  sodium chloride 0.9% lock flush 3 milliLiter(s) IV Push every 8 hours    MEDICATIONS  (PRN):  acetaminophen     Tablet .. 650 milliGRAM(s) Oral every 6 hours PRN Mild Pain (1 - 3)  dextrose Oral Gel 15 Gram(s) Oral once PRN Blood Glucose LESS THAN 70 milliGRAM(s)/deciliter  oxyCODONE    IR 10 milliGRAM(s) Oral every 4 hours PRN Severe Pain (7 - 10)  oxyCODONE    IR 5 milliGRAM(s) Oral every 4 hours PRN Moderate Pain (4 - 6)      Vital Signs Last 24 Hrs  T(C): 36.6 (26 Nov 2022 11:06), Max: 37.1 (26 Nov 2022 00:00)  T(F): 97.9 (26 Nov 2022 11:06), Max: 98.7 (26 Nov 2022 00:00)  HR: 110 (26 Nov 2022 13:55) (65 - 110)  BP: 138/72 (26 Nov 2022 13:55) (107/66 - 148/68)  BP(mean): 96 (25 Nov 2022 15:21) (96 - 96)  RR: 16 (26 Nov 2022 13:55) (16 - 30)  SpO2: 93% (26 Nov 2022 13:55) (93% - 100%)    Parameters below as of 26 Nov 2022 13:55  Patient On (Oxygen Delivery Method): room air      Weight (kg): 89.8 (11-19-22 @ 19:32)    Physical Exam:    Constitutional: NAD, well-developed  Respiratory: CTAB, normal respirations  Cardiovascular: S1 and S2, RRR, healing anterior chest wound  Gastrointestinal: BS+, soft, ntnd  Extremities: bandaged LE   Neurological: AOx3, no focal deficits  Psychiatric: Normal mood and normal affect  Skin: no rashes, no acanthosis      LABS  11-26    139  |  101  |  21.0<H>  ----------------------------<  175<H>  4.8   |  29.0  |  0.73    Ca    8.4      26 Nov 2022 05:46  Mg     2.1     11-26                            9.8    19.21 )-----------( 208      ( 26 Nov 2022 05:46 )             30.2     Triglycerides, Serum: 114 mg/dL (11-23-22 @ 06:00)  HDL Cholesterol, Serum: 35 mg/dL (11-23-22 @ 06:00)  Cholesterol, Serum: 175 mg/dL (11-23-22 @ 06:00)    A1C with Estimated Average Glucose Result: 11.8 % (11-20-22 @ 19:47)            CAPILLARY BLOOD GLUCOSE      POCT Blood Glucose.: 209 mg/dL (26 Nov 2022 12:38)  POCT Blood Glucose.: 166 mg/dL (26 Nov 2022 08:15)  POCT Blood Glucose.: 173 mg/dL (25 Nov 2022 21:08)  POCT Blood Glucose.: 213 mg/dL (25 Nov 2022 17:00)

## 2022-11-27 LAB
ANION GAP SERPL CALC-SCNC: 12 MMOL/L — SIGNIFICANT CHANGE UP (ref 5–17)
BUN SERPL-MCNC: 17.8 MG/DL — SIGNIFICANT CHANGE UP (ref 8–20)
CALCIUM SERPL-MCNC: 8.5 MG/DL — SIGNIFICANT CHANGE UP (ref 8.4–10.5)
CHLORIDE SERPL-SCNC: 98 MMOL/L — SIGNIFICANT CHANGE UP (ref 96–108)
CO2 SERPL-SCNC: 27 MMOL/L — SIGNIFICANT CHANGE UP (ref 22–29)
CREAT SERPL-MCNC: 0.7 MG/DL — SIGNIFICANT CHANGE UP (ref 0.5–1.3)
EGFR: 115 ML/MIN/1.73M2 — SIGNIFICANT CHANGE UP
GLUCOSE BLDC GLUCOMTR-MCNC: 173 MG/DL — HIGH (ref 70–99)
GLUCOSE BLDC GLUCOMTR-MCNC: 266 MG/DL — HIGH (ref 70–99)
GLUCOSE BLDC GLUCOMTR-MCNC: 274 MG/DL — HIGH (ref 70–99)
GLUCOSE BLDC GLUCOMTR-MCNC: 274 MG/DL — HIGH (ref 70–99)
GLUCOSE SERPL-MCNC: 191 MG/DL — HIGH (ref 70–99)
HCT VFR BLD CALC: 28.6 % — LOW (ref 39–50)
HGB BLD-MCNC: 9.5 G/DL — LOW (ref 13–17)
MAGNESIUM SERPL-MCNC: 1.9 MG/DL — SIGNIFICANT CHANGE UP (ref 1.6–2.6)
MCHC RBC-ENTMCNC: 28.9 PG — SIGNIFICANT CHANGE UP (ref 27–34)
MCHC RBC-ENTMCNC: 33.2 GM/DL — SIGNIFICANT CHANGE UP (ref 32–36)
MCV RBC AUTO: 86.9 FL — SIGNIFICANT CHANGE UP (ref 80–100)
PLATELET # BLD AUTO: 239 K/UL — SIGNIFICANT CHANGE UP (ref 150–400)
POTASSIUM SERPL-MCNC: 4 MMOL/L — SIGNIFICANT CHANGE UP (ref 3.5–5.3)
POTASSIUM SERPL-SCNC: 4 MMOL/L — SIGNIFICANT CHANGE UP (ref 3.5–5.3)
RBC # BLD: 3.29 M/UL — LOW (ref 4.2–5.8)
RBC # FLD: 13.2 % — SIGNIFICANT CHANGE UP (ref 10.3–14.5)
SODIUM SERPL-SCNC: 137 MMOL/L — SIGNIFICANT CHANGE UP (ref 135–145)
WBC # BLD: 16.2 K/UL — HIGH (ref 3.8–10.5)
WBC # FLD AUTO: 16.2 K/UL — HIGH (ref 3.8–10.5)

## 2022-11-27 PROCEDURE — 71045 X-RAY EXAM CHEST 1 VIEW: CPT | Mod: 26

## 2022-11-27 PROCEDURE — 99232 SBSQ HOSP IP/OBS MODERATE 35: CPT

## 2022-11-27 RX ORDER — INSULIN LISPRO 100/ML
VIAL (ML) SUBCUTANEOUS AT BEDTIME
Refills: 0 | Status: DISCONTINUED | OUTPATIENT
Start: 2022-11-27 | End: 2022-11-29

## 2022-11-27 RX ORDER — COLCHICINE 0.6 MG
1.2 TABLET ORAL ONCE
Refills: 0 | Status: COMPLETED | OUTPATIENT
Start: 2022-11-27 | End: 2022-11-27

## 2022-11-27 RX ORDER — POTASSIUM CHLORIDE 20 MEQ
20 PACKET (EA) ORAL DAILY
Refills: 0 | Status: DISCONTINUED | OUTPATIENT
Start: 2022-11-27 | End: 2022-11-29

## 2022-11-27 RX ORDER — INSULIN LISPRO 100/ML
VIAL (ML) SUBCUTANEOUS
Refills: 0 | Status: DISCONTINUED | OUTPATIENT
Start: 2022-11-27 | End: 2022-11-29

## 2022-11-27 RX ORDER — INSULIN LISPRO 100/ML
6 VIAL (ML) SUBCUTANEOUS
Refills: 0 | Status: DISCONTINUED | OUTPATIENT
Start: 2022-11-27 | End: 2022-11-29

## 2022-11-27 RX ORDER — INSULIN GLARGINE 100 [IU]/ML
20 INJECTION, SOLUTION SUBCUTANEOUS AT BEDTIME
Refills: 0 | Status: DISCONTINUED | OUTPATIENT
Start: 2022-11-27 | End: 2022-11-29

## 2022-11-27 RX ORDER — MAGNESIUM SULFATE 500 MG/ML
2 VIAL (ML) INJECTION ONCE
Refills: 0 | Status: COMPLETED | OUTPATIENT
Start: 2022-11-27 | End: 2022-11-27

## 2022-11-27 RX ORDER — METOPROLOL TARTRATE 50 MG
100 TABLET ORAL EVERY 12 HOURS
Refills: 0 | Status: DISCONTINUED | OUTPATIENT
Start: 2022-11-27 | End: 2022-11-29

## 2022-11-27 RX ADMIN — Medication 25 GRAM(S): at 09:17

## 2022-11-27 RX ADMIN — Medication 3: at 16:34

## 2022-11-27 RX ADMIN — LIDOCAINE 1 PATCH: 4 CREAM TOPICAL at 19:56

## 2022-11-27 RX ADMIN — SODIUM CHLORIDE 3 MILLILITER(S): 9 INJECTION INTRAMUSCULAR; INTRAVENOUS; SUBCUTANEOUS at 21:34

## 2022-11-27 RX ADMIN — Medication 6: at 11:08

## 2022-11-27 RX ADMIN — ATORVASTATIN CALCIUM 80 MILLIGRAM(S): 80 TABLET, FILM COATED ORAL at 22:16

## 2022-11-27 RX ADMIN — Medication 500 MILLIGRAM(S): at 09:18

## 2022-11-27 RX ADMIN — GABAPENTIN 300 MILLIGRAM(S): 400 CAPSULE ORAL at 22:16

## 2022-11-27 RX ADMIN — Medication 3 UNIT(S): at 09:16

## 2022-11-27 RX ADMIN — Medication 100 MILLIGRAM(S): at 17:04

## 2022-11-27 RX ADMIN — SODIUM CHLORIDE 3 MILLILITER(S): 9 INJECTION INTRAMUSCULAR; INTRAVENOUS; SUBCUTANEOUS at 06:15

## 2022-11-27 RX ADMIN — OXYCODONE HYDROCHLORIDE 10 MILLIGRAM(S): 5 TABLET ORAL at 09:19

## 2022-11-27 RX ADMIN — AMLODIPINE BESYLATE 5 MILLIGRAM(S): 2.5 TABLET ORAL at 06:16

## 2022-11-27 RX ADMIN — Medication 81 MILLIGRAM(S): at 09:18

## 2022-11-27 RX ADMIN — Medication 6 UNIT(S): at 16:33

## 2022-11-27 RX ADMIN — Medication 20 MILLIGRAM(S): at 10:11

## 2022-11-27 RX ADMIN — OXYCODONE HYDROCHLORIDE 10 MILLIGRAM(S): 5 TABLET ORAL at 10:14

## 2022-11-27 RX ADMIN — Medication 650 MILLIGRAM(S): at 22:19

## 2022-11-27 RX ADMIN — INSULIN GLARGINE 20 UNIT(S): 100 INJECTION, SOLUTION SUBCUTANEOUS at 22:16

## 2022-11-27 RX ADMIN — GABAPENTIN 300 MILLIGRAM(S): 400 CAPSULE ORAL at 13:10

## 2022-11-27 RX ADMIN — POLYETHYLENE GLYCOL 3350 17 GRAM(S): 17 POWDER, FOR SOLUTION ORAL at 09:18

## 2022-11-27 RX ADMIN — Medication 325 MILLIGRAM(S): at 09:18

## 2022-11-27 RX ADMIN — Medication 3 UNIT(S): at 11:08

## 2022-11-27 RX ADMIN — LIDOCAINE 1 PATCH: 4 CREAM TOPICAL at 09:18

## 2022-11-27 RX ADMIN — Medication 1.2 MILLIGRAM(S): at 10:11

## 2022-11-27 RX ADMIN — Medication 50 MILLIGRAM(S): at 06:16

## 2022-11-27 RX ADMIN — LIDOCAINE 1 PATCH: 4 CREAM TOPICAL at 21:00

## 2022-11-27 RX ADMIN — GABAPENTIN 300 MILLIGRAM(S): 400 CAPSULE ORAL at 06:16

## 2022-11-27 RX ADMIN — ENOXAPARIN SODIUM 40 MILLIGRAM(S): 100 INJECTION SUBCUTANEOUS at 22:15

## 2022-11-27 RX ADMIN — Medication 2: at 09:17

## 2022-11-27 RX ADMIN — PANTOPRAZOLE SODIUM 40 MILLIGRAM(S): 20 TABLET, DELAYED RELEASE ORAL at 06:16

## 2022-11-27 RX ADMIN — Medication 1: at 22:17

## 2022-11-27 RX ADMIN — SENNA PLUS 2 TABLET(S): 8.6 TABLET ORAL at 22:15

## 2022-11-27 RX ADMIN — SODIUM CHLORIDE 3 MILLILITER(S): 9 INJECTION INTRAMUSCULAR; INTRAVENOUS; SUBCUTANEOUS at 13:05

## 2022-11-27 RX ADMIN — CLOPIDOGREL BISULFATE 75 MILLIGRAM(S): 75 TABLET, FILM COATED ORAL at 09:18

## 2022-11-27 RX ADMIN — OXYCODONE HYDROCHLORIDE 10 MILLIGRAM(S): 5 TABLET ORAL at 14:05

## 2022-11-27 RX ADMIN — Medication 1 MILLIGRAM(S): at 09:18

## 2022-11-27 RX ADMIN — Medication 650 MILLIGRAM(S): at 23:13

## 2022-11-27 RX ADMIN — OXYCODONE HYDROCHLORIDE 10 MILLIGRAM(S): 5 TABLET ORAL at 13:10

## 2022-11-27 RX ADMIN — Medication 20 MILLIEQUIVALENT(S): at 10:14

## 2022-11-27 NOTE — PROGRESS NOTE ADULT - SUBJECTIVE AND OBJECTIVE BOX
POD #4 s/p emergent CABG X 3 (SVG-LAD, SVG-OM, SVG-Diag)    PAST MEDICAL & SURGICAL HISTORY:  HTN (hypertension)  DM (diabetes mellitus)    FAMILY HISTORY:  FH: heart disease (Father)    Brief Hospital Course: 47 year old male every day smoker with a medical history of HTN, type 2 DM (HA1c 11.8 on Metformin), presented initially with complaints of intermittent midsternal chest pain 11/19/22, noted to have an abnormal cardiac CT, s/p LHC 11/22 with WENDY x 2 to LCx, with plan for staged PCI 11/23 for pRamus 95%, but during LHC found dissection of the left main. IABP placed 11/23 for support and patient underwent emergent CABG X 3 (SVG-LAD, SVG-OM, SVG-Diag) on 11/23/22 with Dr. Hillman. Postoperative course significant for volume resuscitation for hypovolemic shock, pressor requirement for hypotension (now weaned off and requiring Lopressor and Norvasc for HTN), and ABLA (s/p blood transfusion).    Significant recent/past 24 hr events: No overnight events reported.    Subjective: Patient lying in bed in NAD. +Tolerating diet. +Passing BMs since surgery. +Pain currently controlled. Denies fevers, chills, lightheadedness, dizziness, HA, CP, palpitations, SOB, cough, abdominal pain, N/V, diarrhea, numbness/tingling in extremities, or any other acute complaints. ROS negative x 10 systems except as noted above.    MEDICATIONS  (STANDING):  amLODIPine   Tablet 5 milliGRAM(s) Oral daily  ascorbic acid 500 milliGRAM(s) Oral daily  aspirin enteric coated 81 milliGRAM(s) Oral daily  atorvastatin 80 milliGRAM(s) Oral at bedtime  clopidogrel Tablet 75 milliGRAM(s) Oral daily  enoxaparin Injectable 40 milliGRAM(s) SubCutaneous every 24 hours  ferrous    sulfate 325 milliGRAM(s) Oral daily  folic acid 1 milliGRAM(s) Oral daily  gabapentin 300 milliGRAM(s) Oral three times a day  insulin glargine Injectable (LANTUS) 18 Unit(s) SubCutaneous at bedtime  insulin lispro (ADMELOG) corrective regimen sliding scale   SubCutaneous Before meals and at bedtime  insulin lispro Injectable (ADMELOG) 3 Unit(s) SubCutaneous three times a day before meals  lidocaine   4% Patch 1 Patch Transdermal daily  metoprolol tartrate 50 milliGRAM(s) Oral three times a day  pantoprazole    Tablet 40 milliGRAM(s) Oral before breakfast  polyethylene glycol 3350 17 Gram(s) Oral daily  senna 2 Tablet(s) Oral at bedtime  sodium chloride 0.9% lock flush 3 milliLiter(s) IV Push every 8 hours    MEDICATIONS  (PRN):  acetaminophen     Tablet .. 650 milliGRAM(s) Oral every 6 hours PRN Mild Pain (1 - 3)  dextrose Oral Gel 15 Gram(s) Oral once PRN Blood Glucose LESS THAN 70 milliGRAM(s)/deciliter  oxyCODONE    IR 5 milliGRAM(s) Oral every 4 hours PRN Moderate Pain (4 - 6)  oxyCODONE    IR 10 milliGRAM(s) Oral every 4 hours PRN Severe Pain (7 - 10)    Allergies:  pork (Stomach Upset)    Vitals   T(C): 36.9 (26 Nov 2022 22:11), Max: 37.1 (26 Nov 2022 05:11)  T(F): 98.4 (26 Nov 2022 22:11), Max: 98.7 (26 Nov 2022 05:11)  HR: 71 (26 Nov 2022 22:11) (66 - 110)  BP: 123/73 (26 Nov 2022 22:11) (109/70 - 148/68)  RR: 16 (26 Nov 2022 22:11) (16 - 17)  SpO2: 100% (26 Nov 2022 22:11) (93% - 100%)  O2 Parameters below as of 26 Nov 2022 22:11  Patient On (Oxygen Delivery Method): room air    I&O's Detail    25 Nov 2022 07:01  -  26 Nov 2022 07:00  --------------------------------------------------------  IN:    IV PiggyBack: 50 mL    IV PiggyBack: 500 mL    IV PiggyBack: 100 mL    NiCARdipine: 12.5 mL    Oral Fluid: 580 mL    sodium chloride 0.9%: 20 mL    sodium chloride 0.9%: 15 mL  Total IN: 1277.5 mL    OUT:    Chest Tube (mL): 220 mL    Chest Tube (mL): 30 mL    Chest Tube (mL): 20 mL    Indwelling Catheter - Urethral (mL): 160 mL    Voided (mL): 1600 mL  Total OUT: 2030 mL    Total NET: -752.5 mL      26 Nov 2022 07:01  -  27 Nov 2022 02:34  --------------------------------------------------------  IN:    Oral Fluid: 780 mL  Total IN: 780 mL    OUT:    Chest Tube (mL): 0 mL    Chest Tube (mL): 180 mL    Voided (mL): 1050 mL  Total OUT: 1230 mL    Total NET: -450 mL    Physical Exam  Neuro: A+O x 3, non-focal, speech clear and intact  HEENT:  NCAT, No conjuctival edema or icterus, no thrush.    Neck:  Supple, trachea midline  Pulm: +BSs diminished bilaterally, no accessory muscle use noted  Chest:        mediastinal CT and left pleural CT all with dressings intact and no air leak, no subQ emphysema       +PW (settings: VVI, rate: 30, mA: 10, sensitivity: 0.8)  CV: regular rate, regular rhythm, +S1S2, no murmur noted  Abd: soft, NT, ND, + BS  Ext: BEACH x 4, +1 LE edema b/l, no cyanosis, distal motor/neuro/circ intact  Skin: warm, dry, perfused  Incisions: midsternal mepilex C/D/I, sternum stable, B/l LE C/D/I w/ mepilex dressings     LABS                        9.8    19.21 )-----------( 208      ( 26 Nov 2022 05:46 )             30.2     11-26    135  |  99  |  20.4<H>  ----------------------------<  206<H>  4.1   |  28.0  |  0.74    Ca    8.4      26 Nov 2022 15:18  Mg     2.1     11-26    POCT Blood Glucose.: 180 mg/dL (11-26-22 @ 21:36)  POCT Blood Glucose.: 193 mg/dL (11-26-22 @ 17:07)  POCT Blood Glucose.: 209 mg/dL (11-26-22 @ 12:38)  POCT Blood Glucose.: 166 mg/dL (11-26-22 @ 08:15)      Last CXR:  < from: Xray Chest 1 View- PORTABLE-Routine (11.25.22 @ 06:19) >  Frontal expiratory view of the chest shows the heart to be similar in size. Right jugular [place] catheter is in similar position to the proximal right pulmonary artery. IABP marker projects approximately 3 cm below the aortic knob. Left chest tube and mediastinal drains remain present.  The lungs show clear right lung with mild left atelectasis and there is no evidence of pneumothorax nor pleural effusion.  Chest one view 11/25/2022 4:53 AM  Compared to the prior study, one mediastinal drain remains present. Trace left apical pneumothorax is present.  IMPRESSION:  Trace left pneumothorax.  < end of copied text >

## 2022-11-27 NOTE — PROGRESS NOTE ADULT - ASSESSMENT
47M smoker w/ HTN, T2DM presented initially with complaints of sternal chest pain, radiating to both arms. Per brother at bedside, patient eats junk food, smokes heavily and works late hours. He was admitted to observation and had an abnormal cardiac CT and hence being admitted for [facility]. Course cb LM dissection requiring emergent CABG.  Consult for diabetes mgmt. A1c 11.8    Uncontrolled T2DM- hyperglycemic still due to no changes in insulin doses, will leave the same for now  -A1c 11.8  -check sugars AC and bedtime  -ensure diabetic diet  -continue lantus 26 units bedtime  -continue admelog 6 units TID  -continue with sliding scale insulin  -needs to be seen by nutrition and CDE for insulin/meter teach  -likely will need insulin on discharge    CAD- s/p 2DES to LCx which was complicated by LM dissection. now s/p 3VCABG, care per cardiology    HLD- continue statin

## 2022-11-27 NOTE — PROGRESS NOTE ADULT - PROBLEM SELECTOR PLAN 1
S/p emergent CABG X 3 (SVG-LAD, SVG-OM, SVG-Diag) on 11/23/22 with Dr. Hillman.   IABP removed 11/24. Continue to monitor groins.   Trending H/H for postop ABLA.   Neurologically intact.  Hemodynamically stable.  Continue Lopressor as tolerated by HR and BP.   Continue Norvasc for HTN.   Continue aspirin and Plavix for acute graft closure prophylaxis.  Continue statin for chronic graft patency prophylaxis.  Oxygenating well, coughing and deep breathing exercises/incentive spirometry encouraged.  D/c CTs this morning if output remains minimal.   Monitor need for diuresis. Monitor I/Os, replenish electrolytes PRN to keep K>4 and Mg>2.   Continue with PT, increase activity as tolerated.  FS AC+HS with coverage for glycemic control.  Tylenol, Oxy PRN for analgesia.  Continue bowel regimen PRN constipation.   Case and plan to be reviewed / discussed with CT Surgery attending / team during AM rounds.

## 2022-11-27 NOTE — PROGRESS NOTE ADULT - SUBJECTIVE AND OBJECTIVE BOX
Interval Events:  no overnight events  follow up on diabetes    patient seen and examined at bedside.  might be going home tomorrow  wife at bedside  does not like the food here  FS still a bit high but insulin doses was not changed    REVIEW OF SYSTEMS:    CONSTITUTIONAL: No fever, weight loss, or fatigue  EYES: No eye pain, visual disturbances, or discharge  ENMT:  No difficulty hearing, tinnitus, vertigo; No sinus or throat pain  NECK: No pain or stiffness  RESPIRATORY: No cough, wheezing, chills or hemoptysis; No shortness of breath  CARDIOVASCULAR: No chest pain, palpitations, dizziness, or leg swelling  GASTROINTESTINAL: No abdominal or epigastric pain. No nausea, vomiting, or hematemesis; No diarrhea or constipation. No melena or hematochezia.  NEUROLOGICAL: No headaches, memory loss, loss of strength, numbness, or tremors  SKIN: No itching, burning, rashes, or lesions   MUSCULOSKELETAL: No joint pain or swelling; No muscle, back, or extremity pain  PSYCHIATRIC: No depression, anxiety, mood swings, or difficulty sleeping        Allergy Status Unknown  pork (Stomach Upset)      MEDICATIONS  (STANDING):  amLODIPine   Tablet 5 milliGRAM(s) Oral daily  ascorbic acid 500 milliGRAM(s) Oral daily  aspirin enteric coated 81 milliGRAM(s) Oral daily  atorvastatin 80 milliGRAM(s) Oral at bedtime  clopidogrel Tablet 75 milliGRAM(s) Oral daily  dextrose 5%. 1000 milliLiter(s) (50 mL/Hr) IV Continuous <Continuous>  dextrose 5%. 1000 milliLiter(s) (100 mL/Hr) IV Continuous <Continuous>  dextrose 50% Injectable 50 milliLiter(s) IV Push every 15 minutes  dextrose 50% Injectable 25 milliLiter(s) IV Push every 15 minutes  enoxaparin Injectable 40 milliGRAM(s) SubCutaneous every 24 hours  ferrous    sulfate 325 milliGRAM(s) Oral daily  folic acid 1 milliGRAM(s) Oral daily  gabapentin 300 milliGRAM(s) Oral three times a day  glucagon  Injectable 1 milliGRAM(s) IntraMuscular once  insulin glargine Injectable (LANTUS) 20 Unit(s) SubCutaneous at bedtime  insulin lispro (ADMELOG) corrective regimen sliding scale   SubCutaneous three times a day before meals  insulin lispro (ADMELOG) corrective regimen sliding scale   SubCutaneous at bedtime  insulin lispro Injectable (ADMELOG) 6 Unit(s) SubCutaneous three times a day before meals  lidocaine   4% Patch 1 Patch Transdermal daily  metoprolol tartrate 100 milliGRAM(s) Oral every 12 hours  pantoprazole    Tablet 40 milliGRAM(s) Oral before breakfast  polyethylene glycol 3350 17 Gram(s) Oral daily  potassium chloride    Tablet ER 20 milliEquivalent(s) Oral daily  senna 2 Tablet(s) Oral at bedtime  sodium chloride 0.9% lock flush 3 milliLiter(s) IV Push every 8 hours  torsemide 20 milliGRAM(s) Oral <User Schedule>    MEDICATIONS  (PRN):  acetaminophen     Tablet .. 650 milliGRAM(s) Oral every 6 hours PRN Mild Pain (1 - 3)  dextrose Oral Gel 15 Gram(s) Oral once PRN Blood Glucose LESS THAN 70 milliGRAM(s)/deciliter  oxyCODONE    IR 5 milliGRAM(s) Oral every 4 hours PRN Moderate Pain (4 - 6)  oxyCODONE    IR 10 milliGRAM(s) Oral every 4 hours PRN Severe Pain (7 - 10)      Vital Signs Last 24 Hrs  T(C): 36.7 (27 Nov 2022 11:15), Max: 36.9 (26 Nov 2022 22:11)  T(F): 98 (27 Nov 2022 11:15), Max: 98.4 (26 Nov 2022 22:11)  HR: 82 (27 Nov 2022 11:45) (68 - 82)  BP: 132/68 (27 Nov 2022 11:45) (120/72 - 142/62)  BP(mean): --  RR: 17 (27 Nov 2022 11:45) (15 - 17)  SpO2: 96% (27 Nov 2022 11:45) (96% - 100%)    Parameters below as of 27 Nov 2022 11:45  Patient On (Oxygen Delivery Method): room air          Physical Exam:    Constitutional: NAD, well-developed  Respiratory: CTAB, normal respirations  Cardiovascular: S1 and S2, RRR, healing anterior chest wound  Gastrointestinal: BS+, soft, ntnd  Extremities: bandaged LE   Neurological: AOx3, no focal deficits  Psychiatric: Normal mood and normal affect  Skin: no rashes, no acanthosis    LABS  11-27    137  |  98  |  17.8  ----------------------------<  191<H>  4.0   |  27.0  |  0.70    Ca    8.5      27 Nov 2022 05:43  Mg     1.9     11-27                            9.5    16.20 )-----------( 239      ( 27 Nov 2022 05:43 )             28.6     Triglycerides, Serum: 114 mg/dL (11-23-22 @ 06:00)  HDL Cholesterol, Serum: 35 mg/dL (11-23-22 @ 06:00)  Cholesterol, Serum: 175 mg/dL (11-23-22 @ 06:00)    A1C with Estimated Average Glucose Result: 11.8 % (11-20-22 @ 19:47)            CAPILLARY BLOOD GLUCOSE      POCT Blood Glucose.: 266 mg/dL (27 Nov 2022 11:04)  POCT Blood Glucose.: 173 mg/dL (27 Nov 2022 08:05)  POCT Blood Glucose.: 180 mg/dL (26 Nov 2022 21:36)  POCT Blood Glucose.: 193 mg/dL (26 Nov 2022 17:07)

## 2022-11-28 ENCOUNTER — TRANSCRIPTION ENCOUNTER (OUTPATIENT)
Age: 47
End: 2022-11-28

## 2022-11-28 LAB
ANION GAP SERPL CALC-SCNC: 10 MMOL/L — SIGNIFICANT CHANGE UP (ref 5–17)
BUN SERPL-MCNC: 18.8 MG/DL — SIGNIFICANT CHANGE UP (ref 8–20)
CALCIUM SERPL-MCNC: 8.4 MG/DL — SIGNIFICANT CHANGE UP (ref 8.4–10.5)
CHLORIDE SERPL-SCNC: 99 MMOL/L — SIGNIFICANT CHANGE UP (ref 96–108)
CO2 SERPL-SCNC: 30 MMOL/L — HIGH (ref 22–29)
CREAT SERPL-MCNC: 0.82 MG/DL — SIGNIFICANT CHANGE UP (ref 0.5–1.3)
EGFR: 110 ML/MIN/1.73M2 — SIGNIFICANT CHANGE UP
GLUCOSE BLDC GLUCOMTR-MCNC: 166 MG/DL — HIGH (ref 70–99)
GLUCOSE BLDC GLUCOMTR-MCNC: 192 MG/DL — HIGH (ref 70–99)
GLUCOSE BLDC GLUCOMTR-MCNC: 194 MG/DL — HIGH (ref 70–99)
GLUCOSE BLDC GLUCOMTR-MCNC: 197 MG/DL — HIGH (ref 70–99)
GLUCOSE SERPL-MCNC: 142 MG/DL — HIGH (ref 70–99)
HCT VFR BLD CALC: 31 % — LOW (ref 39–50)
HGB BLD-MCNC: 10.4 G/DL — LOW (ref 13–17)
MAGNESIUM SERPL-MCNC: 2.1 MG/DL — SIGNIFICANT CHANGE UP (ref 1.6–2.6)
MCHC RBC-ENTMCNC: 28.8 PG — SIGNIFICANT CHANGE UP (ref 27–34)
MCHC RBC-ENTMCNC: 33.5 GM/DL — SIGNIFICANT CHANGE UP (ref 32–36)
MCV RBC AUTO: 85.9 FL — SIGNIFICANT CHANGE UP (ref 80–100)
PLATELET # BLD AUTO: 300 K/UL — SIGNIFICANT CHANGE UP (ref 150–400)
POTASSIUM SERPL-MCNC: 3.7 MMOL/L — SIGNIFICANT CHANGE UP (ref 3.5–5.3)
POTASSIUM SERPL-SCNC: 3.7 MMOL/L — SIGNIFICANT CHANGE UP (ref 3.5–5.3)
RBC # BLD: 3.61 M/UL — LOW (ref 4.2–5.8)
RBC # FLD: 13.2 % — SIGNIFICANT CHANGE UP (ref 10.3–14.5)
SODIUM SERPL-SCNC: 139 MMOL/L — SIGNIFICANT CHANGE UP (ref 135–145)
WBC # BLD: 14.73 K/UL — HIGH (ref 3.8–10.5)
WBC # FLD AUTO: 14.73 K/UL — HIGH (ref 3.8–10.5)

## 2022-11-28 PROCEDURE — 71045 X-RAY EXAM CHEST 1 VIEW: CPT | Mod: 26,77

## 2022-11-28 PROCEDURE — 99232 SBSQ HOSP IP/OBS MODERATE 35: CPT

## 2022-11-28 PROCEDURE — 99024 POSTOP FOLLOW-UP VISIT: CPT

## 2022-11-28 PROCEDURE — 71045 X-RAY EXAM CHEST 1 VIEW: CPT | Mod: 26

## 2022-11-28 RX ORDER — SIMETHICONE 80 MG/1
80 TABLET, CHEWABLE ORAL THREE TIMES A DAY
Refills: 0 | Status: DISCONTINUED | OUTPATIENT
Start: 2022-11-28 | End: 2022-11-29

## 2022-11-28 RX ORDER — LACTULOSE 10 G/15ML
20 SOLUTION ORAL EVERY 6 HOURS
Refills: 0 | Status: DISCONTINUED | OUTPATIENT
Start: 2022-11-28 | End: 2022-11-29

## 2022-11-28 RX ORDER — POTASSIUM CHLORIDE 20 MEQ
40 PACKET (EA) ORAL ONCE
Refills: 0 | Status: COMPLETED | OUTPATIENT
Start: 2022-11-28 | End: 2022-11-28

## 2022-11-28 RX ORDER — COLCHICINE 0.6 MG
0.6 TABLET ORAL
Refills: 0 | Status: DISCONTINUED | OUTPATIENT
Start: 2022-11-28 | End: 2022-11-29

## 2022-11-28 RX ADMIN — Medication 100 MILLIGRAM(S): at 06:11

## 2022-11-28 RX ADMIN — Medication 1 MILLIGRAM(S): at 11:10

## 2022-11-28 RX ADMIN — Medication 40 MILLIEQUIVALENT(S): at 08:18

## 2022-11-28 RX ADMIN — Medication 20 MILLIEQUIVALENT(S): at 11:11

## 2022-11-28 RX ADMIN — Medication 0.6 MILLIGRAM(S): at 16:52

## 2022-11-28 RX ADMIN — POLYETHYLENE GLYCOL 3350 17 GRAM(S): 17 POWDER, FOR SOLUTION ORAL at 11:10

## 2022-11-28 RX ADMIN — AMLODIPINE BESYLATE 5 MILLIGRAM(S): 2.5 TABLET ORAL at 06:11

## 2022-11-28 RX ADMIN — Medication 20 MILLIGRAM(S): at 09:41

## 2022-11-28 RX ADMIN — GABAPENTIN 300 MILLIGRAM(S): 400 CAPSULE ORAL at 13:54

## 2022-11-28 RX ADMIN — Medication 81 MILLIGRAM(S): at 11:10

## 2022-11-28 RX ADMIN — Medication 1: at 12:06

## 2022-11-28 RX ADMIN — Medication 1: at 17:16

## 2022-11-28 RX ADMIN — SODIUM CHLORIDE 3 MILLILITER(S): 9 INJECTION INTRAMUSCULAR; INTRAVENOUS; SUBCUTANEOUS at 16:36

## 2022-11-28 RX ADMIN — ENOXAPARIN SODIUM 40 MILLIGRAM(S): 100 INJECTION SUBCUTANEOUS at 22:25

## 2022-11-28 RX ADMIN — CLOPIDOGREL BISULFATE 75 MILLIGRAM(S): 75 TABLET, FILM COATED ORAL at 11:11

## 2022-11-28 RX ADMIN — SODIUM CHLORIDE 3 MILLILITER(S): 9 INJECTION INTRAMUSCULAR; INTRAVENOUS; SUBCUTANEOUS at 06:15

## 2022-11-28 RX ADMIN — SODIUM CHLORIDE 3 MILLILITER(S): 9 INJECTION INTRAMUSCULAR; INTRAVENOUS; SUBCUTANEOUS at 22:20

## 2022-11-28 RX ADMIN — Medication 500 MILLIGRAM(S): at 11:11

## 2022-11-28 RX ADMIN — Medication 1: at 08:16

## 2022-11-28 RX ADMIN — GABAPENTIN 300 MILLIGRAM(S): 400 CAPSULE ORAL at 22:24

## 2022-11-28 RX ADMIN — Medication 6 UNIT(S): at 17:14

## 2022-11-28 RX ADMIN — PANTOPRAZOLE SODIUM 40 MILLIGRAM(S): 20 TABLET, DELAYED RELEASE ORAL at 06:11

## 2022-11-28 RX ADMIN — Medication 6 UNIT(S): at 08:16

## 2022-11-28 RX ADMIN — ATORVASTATIN CALCIUM 80 MILLIGRAM(S): 80 TABLET, FILM COATED ORAL at 22:25

## 2022-11-28 RX ADMIN — Medication 100 MILLIGRAM(S): at 16:52

## 2022-11-28 RX ADMIN — INSULIN GLARGINE 20 UNIT(S): 100 INJECTION, SOLUTION SUBCUTANEOUS at 22:25

## 2022-11-28 RX ADMIN — GABAPENTIN 300 MILLIGRAM(S): 400 CAPSULE ORAL at 06:11

## 2022-11-28 RX ADMIN — Medication 325 MILLIGRAM(S): at 11:11

## 2022-11-28 RX ADMIN — Medication 6 UNIT(S): at 12:05

## 2022-11-28 NOTE — PROGRESS NOTE ADULT - ASSESSMENT
48 y/o M with PMH of current smoker, HTN, T2DM, initially presented with chest pain, noted to have abnormal cardiac CT. S/p LHC with WENDY x 2 to LCx, with plan for staged PCI, but during LHC found dissection of the left main. IABP placed 11/23 for support and patient underwent emergent CABG on 11/23/22.    1. Uncontrolled T2DM, a1c 11.8%  - Continue lantus 20 units qhs  - Increase admelog to 8 units TID with meals  - RD/CDE consult placed  - Likely will need insulin on discharge    2. CAD/LM dissection  - S/p CABG  - Care per CT surgery    3. HLD  - Continue statin   46 y/o M with PMH of current smoker, HTN, T2DM, initially presented with chest pain, noted to have abnormal cardiac CT. S/p LHC with WENDY x 2 to LCx, with plan for staged PCI, but during LHC found dissection of the left main. IABP placed 11/23 for support and patient underwent emergent CABG on 11/23/22.    1. Uncontrolled T2DM, a1c 11.8%  - Continue lantus 20 units qhs  - Continue admelog 6 units TID with meals  - RD/CDE consult placed  - Likely will need insulin on discharge    2. CAD/LM dissection  - S/p CABG  - Care per CT surgery    3. HLD  - Continue statin

## 2022-11-28 NOTE — DISCHARGE NOTE NURSING/CASE MANAGEMENT/SOCIAL WORK - NSDCPEFALRISK_GEN_ALL_CORE
For information on Fall & Injury Prevention, visit: [email or web address] OR  [email or web address] OR  https://www.cdc.gov/steadi/patient.html

## 2022-11-28 NOTE — DISCHARGE NOTE NURSING/CASE MANAGEMENT/SOCIAL WORK - NSDCPEEMAIL_GEN_ALL_CORE
[facility] for Tobacco Control email [email or web address]

## 2022-11-28 NOTE — DISCHARGE NOTE NURSING/CASE MANAGEMENT/SOCIAL WORK - NSSCCARECORD_GEN_ALL_CORE
[FreeTextEntry1] : 73 y/o gentleman with venous insufficiency, presents for ulcerations on left lower extremity and right leg swelling. No leg pain, fever or chills.\par \par Reports that the [employer] pumps stopped working. [facility]

## 2022-11-28 NOTE — PROGRESS NOTE ADULT - SUBJECTIVE AND OBJECTIVE BOX
Subjective: Patient seen and assessed s/p CABG. Patient states "I need to walk" At time of exam, Pt denies chest pain, palpitations, dizziness, headache, shortness of breath, abdominal pain or N/V/D/C.    Pertinent events of the past 24 hours: PW and pericardial ashwin removed, left CT with high thin serosanguineous output    VITAL SIGNS  Vital Signs Last 24 Hrs  T(C): 36.7 (22 @ 21:00), Max: 36.8 (22 @ 06:14)  T(F): 98.1 (22 @ 21:00), Max: 98.3 (22 @ 06:14)  HR: 75 (22 @ 00:15) (68 - 82)  BP: 113/70 (22 @ 00:15) (113/70 - 132/68)  RR: 18 (22 @ 00:15) (15 - 18)  SpO2: 99% (22 @ 00:15) (91% - 99%)  on (O2)              Telemetry/Alarms:  NSR 70s    MEDICATIONS  acetaminophen     Tablet .. 650 milliGRAM(s) Oral every 6 hours PRN  amLODIPine   Tablet 5 milliGRAM(s) Oral daily  ascorbic acid 500 milliGRAM(s) Oral daily  aspirin enteric coated 81 milliGRAM(s) Oral daily  atorvastatin 80 milliGRAM(s) Oral at bedtime  clopidogrel Tablet 75 milliGRAM(s) Oral daily  enoxaparin Injectable 40 milliGRAM(s) SubCutaneous every 24 hours  ferrous    sulfate 325 milliGRAM(s) Oral daily  folic acid 1 milliGRAM(s) Oral daily  gabapentin 300 milliGRAM(s) Oral three times a day  insulin glargine Injectable (LANTUS) 20 Unit(s) SubCutaneous at bedtime  insulin lispro (ADMELOG) corrective regimen sliding scale   SubCutaneous three times a day before meals  insulin lispro (ADMELOG) corrective regimen sliding scale   SubCutaneous at bedtime  insulin lispro Injectable (ADMELOG) 6 Unit(s) SubCutaneous three times a day before meals  lidocaine   4% Patch 1 Patch Transdermal daily  metoprolol tartrate 100 milliGRAM(s) Oral every 12 hours  oxyCODONE    IR 5 milliGRAM(s) Oral every 4 hours PRN  oxyCODONE    IR 10 milliGRAM(s) Oral every 4 hours PRN  pantoprazole    Tablet 40 milliGRAM(s) Oral before breakfast  polyethylene glycol 3350 17 Gram(s) Oral daily  potassium chloride    Tablet ER 20 milliEquivalent(s) Oral daily  senna 2 Tablet(s) Oral at bedtime  sodium chloride 0.9% lock flush 3 milliLiter(s) IV Push every 8 hours  torsemide 20 milliGRAM(s) Oral <User Schedule>    PHYSICAL EXAM  Constitutional: NAD  Neuro: A+O x 3, non-focal, speech clear and intact  CV: regular rate, regular rhythm, +S1S2, no murmurs or rub  Pulm/chest: lung sounds CTA and equal bilaterally, no accessory muscle use noted  Abd: +BS, soft, NT, ND  Ext: BEACH x 4, no edema  Skin: warm, well perfused  Psych: calm, appropriate affect  Incision: midline sternal incision c/d/i with mepilex ag, no audible sternal click, b/l LE vein harvest site c/d/i with mepilex ag  Drains: Left CT to H2O seal, small air leak v. tidaling noted, no SQ air noted    Intake and Output   @ : @ 07:00  --------------------------------------------------------  IN: 1260 mL / OUT: 1380 mL / NET: -120 mL     @ : @ 00:54  --------------------------------------------------------  IN: 1070 mL / OUT: 950 mL / NET: 120 mL    Weights:  Daily     Daily Weight in k.6 (2022 06:07)  Admit Wt: Drug Dosing Weight  Height (cm): 170.2 (2022 19:32)  Weight (kg): 89.8 (2022 19:32)  BMI (kg/m2): 31 (2022 19:32)  BSA (m2): 2.01 (2022 19:32)    All laboratory results, radiology and medications reviewed.    LABS      137  |  98  |  17.8  ----------------------------<  191<H>  4.0   |  27.0  |  0.70    Ca    8.5      2022 05:43  Mg     1.9                                        9.5    16.20 )-----------( 239      ( 2022 05:43 )             28.6            CAPILLARY BLOOD GLUCOSE  POCT Blood Glucose.: 274 mg/dL (2022 21:42)  POCT Blood Glucose.: 274 mg/dL (2022 16:11)  POCT Blood Glucose.: 266 mg/dL (2022 11:04)  POCT Blood Glucose.: 173 mg/dL (2022 08:05)         < from: Xray Chest 1 View- PORTABLE-Routine (22 @ 06:19) >    IMPRESSION:  Trace left pneumothorax.    < end of copied text >    < from: TTE Echo Complete w/ Contrast w/ Doppler (22 @ 08:49) >    Summary:   1. Leftventricular ejection fraction, by visual estimation, is 60 to   65%.   2. Normal global left ventricular systolic function.   3. Normal right ventricular size and function.   4. No significant valve disease.   5. There is no evidence of pericardial effusion.    < end of copied text >    < from: 12 Lead ECG (22 @ 03:59) >    Ventricular Rate 71 BPM    Atrial Rate 71 BPM    P-R Interval 126 ms    QRS Duration 96 ms    Q-T Interval 362 ms    QTC Calculation(Bazett) 393 ms    P Axis 128 degrees    R Axis 173 degrees    T Axis 191 degrees    Diagnosis Line *** Suspect arm lead reversal, interpretation assumes no reversal  sinus rhythm  limb lead reversal    nonspecific Q's inderior leads  ST & T wave abnormality, consider lateral ischemia  Abnormal ECG    < end of copied text >

## 2022-11-28 NOTE — PROGRESS NOTE ADULT - SUBJECTIVE AND OBJECTIVE BOX
INTERVAL EVENTS:  Follow up diabetes management  Feels tired with activity     ROS: Patient denies chest pain, SOB, abd pain, N/V.    MEDICATIONS  (STANDING):  amLODIPine   Tablet 5 milliGRAM(s) Oral daily  ascorbic acid 500 milliGRAM(s) Oral daily  aspirin enteric coated 81 milliGRAM(s) Oral daily  atorvastatin 80 milliGRAM(s) Oral at bedtime  clopidogrel Tablet 75 milliGRAM(s) Oral daily  colchicine 0.6 milliGRAM(s) Oral two times a day  dextrose 5%. 1000 milliLiter(s) (50 mL/Hr) IV Continuous <Continuous>  dextrose 5%. 1000 milliLiter(s) (100 mL/Hr) IV Continuous <Continuous>  dextrose 50% Injectable 50 milliLiter(s) IV Push every 15 minutes  dextrose 50% Injectable 25 milliLiter(s) IV Push every 15 minutes  enoxaparin Injectable 40 milliGRAM(s) SubCutaneous every 24 hours  ferrous    sulfate 325 milliGRAM(s) Oral daily  folic acid 1 milliGRAM(s) Oral daily  gabapentin 300 milliGRAM(s) Oral three times a day  glucagon  Injectable 1 milliGRAM(s) IntraMuscular once  insulin glargine Injectable (LANTUS) 20 Unit(s) SubCutaneous at bedtime  insulin lispro (ADMELOG) corrective regimen sliding scale   SubCutaneous three times a day before meals  insulin lispro (ADMELOG) corrective regimen sliding scale   SubCutaneous at bedtime  insulin lispro Injectable (ADMELOG) 6 Unit(s) SubCutaneous three times a day before meals  lidocaine   4% Patch 1 Patch Transdermal daily  metoprolol tartrate 100 milliGRAM(s) Oral every 12 hours  pantoprazole    Tablet 40 milliGRAM(s) Oral before breakfast  polyethylene glycol 3350 17 Gram(s) Oral daily  potassium chloride    Tablet ER 20 milliEquivalent(s) Oral daily  senna 2 Tablet(s) Oral at bedtime  sodium chloride 0.9% lock flush 3 milliLiter(s) IV Push every 8 hours  torsemide 20 milliGRAM(s) Oral <User Schedule>    MEDICATIONS  (PRN):  acetaminophen     Tablet .. 650 milliGRAM(s) Oral every 6 hours PRN Mild Pain (1 - 3)  dextrose Oral Gel 15 Gram(s) Oral once PRN Blood Glucose LESS THAN 70 milliGRAM(s)/deciliter  oxyCODONE    IR 10 milliGRAM(s) Oral every 4 hours PRN Severe Pain (7 - 10)  oxyCODONE    IR 5 milliGRAM(s) Oral every 4 hours PRN Moderate Pain (4 - 6)  simethicone 80 milliGRAM(s) Chew three times a day PRN Gas    Allergies  Allergy Status Unknown    Intolerances  pork (Stomach Upset)    Vital Signs Last 24 Hrs  T(C): 36.7 (28 Nov 2022 11:09), Max: 36.8 (27 Nov 2022 16:00)  T(F): 98 (28 Nov 2022 11:09), Max: 98.3 (27 Nov 2022 16:00)  HR: 75 (28 Nov 2022 11:09) (63 - 78)  BP: 118/79 (28 Nov 2022 11:09) (106/65 - 127/71)  BP(mean): 88 (27 Nov 2022 16:00) (88 - 88)  RR: 18 (28 Nov 2022 11:09) (17 - 18)  SpO2: 99% (28 Nov 2022 11:09) (91% - 100%)    Parameters below as of 28 Nov 2022 11:09  Patient On (Oxygen Delivery Method): room air      PHYSICAL EXAM:  General: No apparent distress  Neck: Supple, trachea midline, no thyromegaly  Respiratory: Lungs clear bilaterally, normal rate, effort  Cardiac: +S1, S2, no m/r/g, chest tube  GI: +BS, soft, non tender, non distended  Extremities: No peripheral edema, no pedal lesions  Neuro: A+O X3, no tremor  Pysch: Affect appropriate   Skin: No acanthosis       LABS:                        10.4   14.73 )-----------( 300      ( 28 Nov 2022 04:40 )             31.0     11-28    139  |  99  |  18.8  ----------------------------<  142<H>  3.7   |  30.0<H>  |  0.82    Ca    8.4      28 Nov 2022 04:40  Mg     2.1     11-28        POCT Blood Glucose.: 197 mg/dL (11-28-22 @ 12:02)  POCT Blood Glucose.: 194 mg/dL (11-28-22 @ 08:12)  POCT Blood Glucose.: 274 mg/dL (11-27-22 @ 21:42)  POCT Blood Glucose.: 274 mg/dL (11-27-22 @ 16:11)

## 2022-11-28 NOTE — DISCHARGE NOTE NURSING/CASE MANAGEMENT/SOCIAL WORK - NSTRANSFERBELONGINGSDISPO_GEN_A_NUR
[facility]    [address] [facility] [address]    Phone:  [phone]    Fax:  [phone]       Thank You for choosing us for your health care visit. We are glad to serve you and happy to provide you with this summary of your visit. Please help us to ensure we have accurate records. If you find anything that needs to be changed, please let our staff know as soon as possible.          Your Demographic Information     Patient Name Sex Alvarez Fernandez Male 1956       Ethnic Group Patient Race    Not of  or  Origin White      Your Visit Details     Date & Time Provider Department    3/15/2017 8:30 AM EFRAIN Caballero [facility]      Your Upcoming Appointment*(Max 10)     Wednesday May 03, 2017  8:45 AM CDT   Follow-up Visit with EFRAIN Caballero   [facility] ([facility])    [address] [facility] [address] [phone]            2017  7:15 AM CST   Follow-up Visit with Chao Davis MD   [facility] ([facility])    [address]  [facility] [address] [phone]              Your To Do List     Future Orders Please Complete On or Around Expires    CBC & AUTO DIFFERENTIAL  Mar 15, 2017 2017    COMPREHENSIVE METABOLIC PANEL  Mar 15, 2017 2017    LIPID PANEL WITH REFLEX  Mar 15, 2017 2017    PROSTATE SPECIFIC ANTIGEN  Mar 15, 2017 (Approximate) 2017    URIC ACID  Mar 15, 2017 2017    URINALYSIS WITH MICRO & CULTURE IF INDICATED  Mar 15, 2017 2017    Follow-Up    Return in about 6 weeks (around 2017) for follow up.      We Ordered or Performed the Following     OPEN ACCESS COLONOSCOPY       Conditions Discussed Today or Order-Related Diagnoses        Comments    Annual physical exam    -  Primary     Essential (primary) hypertension     mild elevation    Hyperlipidemia, unspecified hyperlipidemia type         Chronic gout  with patient without tophus, unspecified cause, unspecified site         Moderate persistent asthma without complication           Your Vitals Were     BP Pulse Temp Resp Height Weight    138/72 (BP Location: [facility], Patient Position: Sitting, Cuff Size: Large Adult) 78 98.4 °F (36.9 °C) (Tympanic) 14 5' 9\" (1.753 m) 178 lb 9.6 oz (81 kg)    BMI Smoking Status                26.37 kg/m2 Never Smoker          Medications Prescribed or Re-Ordered Today     losartan (COZAAR) 50 MG tablet    Sig - Route: Take 1 tablet by mouth daily. - Oral    Class: Eprescribe    Pharmacy[id number] - [place] - [address] AT [facility] Ph #: [phone]    allopurinol (ZYLOPRIM) 100 MG tablet    Sig - Route: Take 2 tablets by mouth daily. - Oral    Class: Eprescribe    Pharmacy: [id number] - [place], [place] - [address] AT [facility] and [facility] Ph #: [phone]    predniSONE (DELTASONE) 20 MG tablet    Sig: Prn for gout flare-up. Use 2 tabs bid prn    Class: Eprescribe    Pharmacy: [id number] - [place], [place] - [address] AT [facility] and [facility] Ph #: [phone]      Your Current Medications Are        Disp Refills Start End    allopurinol (ZYLOPRIM) 100 MG tablet 60 tablet 11 3/15/2017     Sig - Route: Take 2 tablets by mouth daily. - Oral    Class: Eprescribe    diltiazem (TIAZAC) 360 MG 24 hr capsule 90 capsule 0 3/1/2017     Sig - Route: Take 1 capsule by mouth daily. - Oral    Class: Eprescribe    fluticasone-salmeterol (ADVAIR DISKUS) 500-50 MCG/DOSE AEROSOL POWDER, BREATH ACTIVATED        Sig - Route: Inhale 1 puff into the lungs two times daily. - Inhalation    Class: Historical Med    atorvastatin (LIPITOR) 10 MG tablet 90 tablet 3 11/18/2016     Sig - Route: Take 1 tablet by mouth daily. - Oral    Class: Eprescribe    ezetimibe (ZETIA) 10 MG tablet 90 tablet 3 11/18/2016     Sig - Route: Take 1 tablet by mouth daily. - Oral    Class: Eprescribe    aspirin 81 MG tablet 60 tablet 2  11/29/2012     Sig - Route: Take 1 tablet by mouth daily. - Oral    Class: Script Not Printed    albuterol (PROAIR HFA) 108 (90 BASE) MCG/ACT inhaler 1 Inhaler 12 11/20/2012     Sig - Route: Inhale 2 puffs into the lungs every 4 hours as needed for Shortness of Breath or Wheezing. - Inhalation    Class: Historical Med    losartan (COZAAR) 50 MG tablet 30 tablet 11 3/15/2017     Sig - Route: Take 1 tablet by mouth daily. - Oral    Class: Eprescribe    predniSONE (DELTASONE) 20 MG tablet 20 tablet 1 3/15/2017     Sig: Prn for gout flare-up. Use 2 tabs bid prn    Class: Eprescribe    montelukast (SINGULAIR) 10 MG tablet   11/20/2012     Sig - Route: Take 10 mg by mouth as needed. - Oral    Class: Historical Med      Allergies     Bee Sting SWELLING      Immunizations History as of 3/15/2017     Name Date    Hepatitis B Child 9/8/1997, 4/8/1997, 3/7/1997    Influenza 10/20/2016, 2/5/2008, 11/1/2005, 10/18/2004    Influenza A novel H1N1 12/15/2009    Pneumococcal Polysaccharide Adult 4/18/2011, 10/20/2009    Tdap 10/1/1997      Problem List as of 3/15/2017     MVP (mitral valve prolapse)    Essential (primary) hypertension    High cholesterol    Asthma    Gout    Facial rash    Hyperlipidemia              Patient Instructions    Clinic hours for [facility]:  Monday 7am - 3:30pm  Tuesday 7am - 3:30pm  Wednesday 7am - 3:30pm  Thursday 7am - 3:30pm  Friday  7am - 3pm    If you need a refill on your prescription please call your pharmacy and let them know. Please be proactive and call before your medication runs out. The pharmacy will then contact us for the refill. Please allow 24-48 hours for the refill to be processed.     If your physician has ordered additional laboratory or radiology testing as part of your ongoing plan of care, please allow 7-10 business days from the day of your lab draw or test for the results to be sent and reviewed by your provider. If your results are critical and require more immediate  intervention, you will be contacted sooner. Your results will be conveyed to you via a phone call or letter.    You may be receiving a patient satisfaction survey in the mail. Please take the time to complete, as your feedback is very important to us. We strive to make your experience exceptional and your comments help us with that goal. We look forward to hearing from you.    Rivera Mosqueda, PAC  [facility]   Physician Assistant Certified    [address] [place] [address]     Main Phone:  [phone]  Medical Assistant Verenice Charlton Phone:  [phone]      Please call with any problems, troubles or change in condition.    Thank You,     Rivera Mosqueda, PAC

## 2022-11-28 NOTE — DISCHARGE NOTE NURSING/CASE MANAGEMENT/SOCIAL WORK - NSDCFUADDAPPT_GEN_ALL_CORE_FT
Please call and make an appointment with Dr. Morillo for post procedure, hospital follow up.     CARDIOLOGY FOLLOW UP - DR GRANT - WHEN U CALL LET THEM KNOW YOU ARE FOR F/U POST OPEN HEART AND  NEED F/U - YOU HAVE NO INSURANCE AND REQUEST SLIDING SCALE     [facility]- MEDICAL AND DIABETES FOLLOW UP - [phone]-- SLIDING SCALE PAYMENT  Please call and make an appointment with Dr. Morillo for post procedure, hospital follow up.       [facility]- MEDICAL AND DIABETES FOLLOW UP - [phone]-- SLIDING SCALE PAYMENT   [address] [facility]   DECEMBER 13 ,2022 AT 10 AM

## 2022-11-28 NOTE — DISCHARGE NOTE NURSING/CASE MANAGEMENT/SOCIAL WORK - PATIENT PORTAL LINK FT
You can access the FollowMyHealth Patient Portal offered by [facility] by registering at the following website: [email or web address]. By joining [facility]’s FollowMyHealth portal, you will also be able to view your health information using other applications (apps) compatible with our system.

## 2022-11-28 NOTE — PROGRESS NOTE ADULT - PROBLEM SELECTOR PLAN 1
S/p emergent CABG X 3 (SVG-LAD, SVG-OM, SVG-Diag) on 11/23/22 with Dr. Hillman.   IABP removed 11/24. Continue to monitor groins.   Neurologically intact.  Hemodynamically stable.  Continue Lopressor as tolerated by HR and BP.   Continue Norvasc for HTN.   Continue aspirin and Plavix for acute graft closure prophylaxis.  Continue statin for chronic graft patency prophylaxis.  Oxygenating well, coughing and deep breathing exercises/incentive spirometry encouraged.  Left CT with high output drainage, given loading dose of colchicine. Air leak v. tidaling noted.  Monitor need for diuresis. Monitor I/Os, replenish electrolytes PRN to keep K>4 and Mg>2.   Continue with PT, increase activity as tolerated.  FS AC+HS with coverage for glycemic control; insulin teaching ongoing  Tylenol, Oxy PRN for analgesia.  Continue diuresis PRN, replete electrolytes PRN to maintain K >4, Mg >2  Continue bowel regimen PRN constipation.   Case and plan to be reviewed / discussed with CT Surgery attending / team during AM rounds.

## 2022-11-28 NOTE — DISCHARGE NOTE NURSING/CASE MANAGEMENT/SOCIAL WORK - NSDCPEWEB_GEN_ALL_CORE
[facility] for Tobacco Control website --- [email or web address] website --- [email or web address]

## 2022-11-29 VITALS
HEART RATE: 65 BPM | TEMPERATURE: 98 F | RESPIRATION RATE: 18 BRPM | DIASTOLIC BLOOD PRESSURE: 72 MMHG | SYSTOLIC BLOOD PRESSURE: 122 MMHG | OXYGEN SATURATION: 95 %

## 2022-11-29 PROBLEM — I10 ESSENTIAL (PRIMARY) HYPERTENSION: Chronic | Status: ACTIVE | Noted: 2022-11-19

## 2022-11-29 PROBLEM — E11.9 TYPE 2 DIABETES MELLITUS WITHOUT COMPLICATIONS: Chronic | Status: ACTIVE | Noted: 2022-11-19

## 2022-11-29 LAB
ANION GAP SERPL CALC-SCNC: 13 MMOL/L — SIGNIFICANT CHANGE UP (ref 5–17)
BUN SERPL-MCNC: 18.7 MG/DL — SIGNIFICANT CHANGE UP (ref 8–20)
CALCIUM SERPL-MCNC: 8.5 MG/DL — SIGNIFICANT CHANGE UP (ref 8.4–10.5)
CHLORIDE SERPL-SCNC: 102 MMOL/L — SIGNIFICANT CHANGE UP (ref 96–108)
CO2 SERPL-SCNC: 24 MMOL/L — SIGNIFICANT CHANGE UP (ref 22–29)
CREAT SERPL-MCNC: 0.73 MG/DL — SIGNIFICANT CHANGE UP (ref 0.5–1.3)
EGFR: 114 ML/MIN/1.73M2 — SIGNIFICANT CHANGE UP
GLUCOSE BLDC GLUCOMTR-MCNC: 176 MG/DL — HIGH (ref 70–99)
GLUCOSE BLDC GLUCOMTR-MCNC: 189 MG/DL — HIGH (ref 70–99)
GLUCOSE SERPL-MCNC: 151 MG/DL — HIGH (ref 70–99)
HCT VFR BLD CALC: 30.4 % — LOW (ref 39–50)
HGB BLD-MCNC: 10.2 G/DL — LOW (ref 13–17)
MAGNESIUM SERPL-MCNC: 1.9 MG/DL — SIGNIFICANT CHANGE UP (ref 1.6–2.6)
MCHC RBC-ENTMCNC: 28.4 PG — SIGNIFICANT CHANGE UP (ref 27–34)
MCHC RBC-ENTMCNC: 33.6 GM/DL — SIGNIFICANT CHANGE UP (ref 32–36)
MCV RBC AUTO: 84.7 FL — SIGNIFICANT CHANGE UP (ref 80–100)
PLATELET # BLD AUTO: 341 K/UL — SIGNIFICANT CHANGE UP (ref 150–400)
POTASSIUM SERPL-MCNC: 3.9 MMOL/L — SIGNIFICANT CHANGE UP (ref 3.5–5.3)
POTASSIUM SERPL-SCNC: 3.9 MMOL/L — SIGNIFICANT CHANGE UP (ref 3.5–5.3)
RBC # BLD: 3.59 M/UL — LOW (ref 4.2–5.8)
RBC # FLD: 13.3 % — SIGNIFICANT CHANGE UP (ref 10.3–14.5)
SODIUM SERPL-SCNC: 139 MMOL/L — SIGNIFICANT CHANGE UP (ref 135–145)
WBC # BLD: 14.43 K/UL — HIGH (ref 3.8–10.5)
WBC # FLD AUTO: 14.43 K/UL — HIGH (ref 3.8–10.5)

## 2022-11-29 PROCEDURE — 80061 LIPID PANEL: CPT

## 2022-11-29 PROCEDURE — 33967 INSERT I-AORT PERCUT DEVICE: CPT

## 2022-11-29 PROCEDURE — C1887: CPT

## 2022-11-29 PROCEDURE — 97116 GAIT TRAINING THERAPY: CPT

## 2022-11-29 PROCEDURE — 71045 X-RAY EXAM CHEST 1 VIEW: CPT

## 2022-11-29 PROCEDURE — 82435 ASSAY OF BLOOD CHLORIDE: CPT

## 2022-11-29 PROCEDURE — 82962 GLUCOSE BLOOD TEST: CPT

## 2022-11-29 PROCEDURE — 80053 COMPREHEN METABOLIC PANEL: CPT

## 2022-11-29 PROCEDURE — C1874: CPT

## 2022-11-29 PROCEDURE — 82553 CREATINE MB FRACTION: CPT

## 2022-11-29 PROCEDURE — 81001 URINALYSIS AUTO W/SCOPE: CPT

## 2022-11-29 PROCEDURE — 93325 DOPPLER ECHO COLOR FLOW MAPG: CPT

## 2022-11-29 PROCEDURE — 84295 ASSAY OF SERUM SODIUM: CPT

## 2022-11-29 PROCEDURE — G0378: CPT

## 2022-11-29 PROCEDURE — 83605 ASSAY OF LACTIC ACID: CPT

## 2022-11-29 PROCEDURE — 96374 THER/PROPH/DIAG INJ IV PUSH: CPT

## 2022-11-29 PROCEDURE — 86850 RBC ANTIBODY SCREEN: CPT

## 2022-11-29 PROCEDURE — U0005: CPT

## 2022-11-29 PROCEDURE — 83880 ASSAY OF NATRIURETIC PEPTIDE: CPT

## 2022-11-29 PROCEDURE — 85384 FIBRINOGEN ACTIVITY: CPT

## 2022-11-29 PROCEDURE — 71045 X-RAY EXAM CHEST 1 VIEW: CPT | Mod: 26

## 2022-11-29 PROCEDURE — 93454 CORONARY ARTERY ANGIO S&I: CPT | Mod: 59

## 2022-11-29 PROCEDURE — C1753: CPT

## 2022-11-29 PROCEDURE — 82550 ASSAY OF CK (CPK): CPT

## 2022-11-29 PROCEDURE — P9037: CPT

## 2022-11-29 PROCEDURE — 84100 ASSAY OF PHOSPHORUS: CPT

## 2022-11-29 PROCEDURE — C1725: CPT

## 2022-11-29 PROCEDURE — P9016: CPT

## 2022-11-29 PROCEDURE — 93312 ECHO TRANSESOPHAGEAL: CPT

## 2022-11-29 PROCEDURE — 86900 BLOOD TYPING SEROLOGIC ABO: CPT

## 2022-11-29 PROCEDURE — 93320 DOPPLER ECHO COMPLETE: CPT

## 2022-11-29 PROCEDURE — P9100: CPT

## 2022-11-29 PROCEDURE — 83036 HEMOGLOBIN GLYCOSYLATED A1C: CPT

## 2022-11-29 PROCEDURE — 80048 BASIC METABOLIC PNL TOTAL CA: CPT

## 2022-11-29 PROCEDURE — 97163 PT EVAL HIGH COMPLEX 45 MIN: CPT

## 2022-11-29 PROCEDURE — 85018 HEMOGLOBIN: CPT

## 2022-11-29 PROCEDURE — 84484 ASSAY OF TROPONIN QUANT: CPT

## 2022-11-29 PROCEDURE — 96375 TX/PRO/DX INJ NEW DRUG ADDON: CPT

## 2022-11-29 PROCEDURE — 82947 ASSAY GLUCOSE BLOOD QUANT: CPT

## 2022-11-29 PROCEDURE — 85610 PROTHROMBIN TIME: CPT

## 2022-11-29 PROCEDURE — 94002 VENT MGMT INPAT INIT DAY: CPT

## 2022-11-29 PROCEDURE — 85025 COMPLETE CBC W/AUTO DIFF WBC: CPT

## 2022-11-29 PROCEDURE — 82330 ASSAY OF CALCIUM: CPT

## 2022-11-29 PROCEDURE — C9606: CPT | Mod: LC

## 2022-11-29 PROCEDURE — 83735 ASSAY OF MAGNESIUM: CPT

## 2022-11-29 PROCEDURE — 85014 HEMATOCRIT: CPT

## 2022-11-29 PROCEDURE — C1894: CPT

## 2022-11-29 PROCEDURE — 93005 ELECTROCARDIOGRAM TRACING: CPT

## 2022-11-29 PROCEDURE — 86965 POOLING BLOOD PLATELETS: CPT

## 2022-11-29 PROCEDURE — 99024 POSTOP FOLLOW-UP VISIT: CPT

## 2022-11-29 PROCEDURE — 83690 ASSAY OF LIPASE: CPT

## 2022-11-29 PROCEDURE — 85027 COMPLETE CBC AUTOMATED: CPT

## 2022-11-29 PROCEDURE — 86901 BLOOD TYPING SEROLOGIC RH(D): CPT

## 2022-11-29 PROCEDURE — 36415 COLL VENOUS BLD VENIPUNCTURE: CPT

## 2022-11-29 PROCEDURE — 85730 THROMBOPLASTIN TIME PARTIAL: CPT

## 2022-11-29 PROCEDURE — 82803 BLOOD GASES ANY COMBINATION: CPT

## 2022-11-29 PROCEDURE — 94760 N-INVAS EAR/PLS OXIMETRY 1: CPT

## 2022-11-29 PROCEDURE — C1769: CPT

## 2022-11-29 PROCEDURE — 99285 EMERGENCY DEPT VISIT HI MDM: CPT

## 2022-11-29 PROCEDURE — C1889: CPT

## 2022-11-29 PROCEDURE — 36430 TRANSFUSION BLD/BLD COMPNT: CPT

## 2022-11-29 PROCEDURE — 92978 ENDOLUMINL IVUS OCT C 1ST: CPT | Mod: LD

## 2022-11-29 PROCEDURE — P9012: CPT

## 2022-11-29 PROCEDURE — 75574 CT ANGIO HRT W/3D IMAGE: CPT | Mod: MA

## 2022-11-29 PROCEDURE — 99232 SBSQ HOSP IP/OBS MODERATE 35: CPT

## 2022-11-29 PROCEDURE — 86923 COMPATIBILITY TEST ELECTRIC: CPT

## 2022-11-29 PROCEDURE — C1751: CPT

## 2022-11-29 PROCEDURE — U0003: CPT

## 2022-11-29 PROCEDURE — 84132 ASSAY OF SERUM POTASSIUM: CPT

## 2022-11-29 PROCEDURE — C8929: CPT

## 2022-11-29 RX ORDER — METOPROLOL TARTRATE 50 MG
1 TABLET ORAL
Qty: 60 | Refills: 1
Start: 2022-11-29 | End: 2023-01-27

## 2022-11-29 RX ORDER — ATORVASTATIN CALCIUM 80 MG/1
1 TABLET, FILM COATED ORAL
Qty: 30 | Refills: 1
Start: 2022-11-29 | End: 2023-01-27

## 2022-11-29 RX ORDER — CLOPIDOGREL BISULFATE 75 MG/1
1 TABLET, FILM COATED ORAL
Qty: 30 | Refills: 1
Start: 2022-11-29 | End: 2023-01-27

## 2022-11-29 RX ORDER — SENNA PLUS 8.6 MG/1
2 TABLET ORAL
Qty: 10 | Refills: 0
Start: 2022-11-29 | End: 2022-12-03

## 2022-11-29 RX ORDER — INSULIN GLARGINE 100 [IU]/ML
20 INJECTION, SOLUTION SUBCUTANEOUS
Qty: 1 | Refills: 1
Start: 2022-11-29 | End: 2023-01-27

## 2022-11-29 RX ORDER — ASPIRIN/CALCIUM CARB/MAGNESIUM 324 MG
1 TABLET ORAL
Qty: 30 | Refills: 1
Start: 2022-11-29 | End: 2023-01-27

## 2022-11-29 RX ORDER — METFORMIN HYDROCHLORIDE 850 MG/1
1 TABLET ORAL
Qty: 60 | Refills: 1
Start: 2022-11-29 | End: 2023-01-27

## 2022-11-29 RX ORDER — METFORMIN HYDROCHLORIDE 850 MG/1
1 TABLET ORAL
Qty: 0 | Refills: 0 | DISCHARGE

## 2022-11-29 RX ORDER — MAGNESIUM SULFATE 500 MG/ML
2 VIAL (ML) INJECTION ONCE
Refills: 0 | Status: COMPLETED | OUTPATIENT
Start: 2022-11-29 | End: 2022-11-29

## 2022-11-29 RX ORDER — AMLODIPINE BESYLATE 2.5 MG/1
1 TABLET ORAL
Qty: 30 | Refills: 1
Start: 2022-11-29 | End: 2023-01-27

## 2022-11-29 RX ORDER — METOPROLOL TARTRATE 50 MG
1 TABLET ORAL
Qty: 0 | Refills: 0 | DISCHARGE

## 2022-11-29 RX ORDER — OXYCODONE HYDROCHLORIDE 5 MG/1
1 TABLET ORAL
Qty: 20 | Refills: 0
Start: 2022-11-29 | End: 2022-12-03

## 2022-11-29 RX ORDER — ACETAMINOPHEN 500 MG
2 TABLET ORAL
Qty: 0 | Refills: 0 | DISCHARGE
Start: 2022-11-29

## 2022-11-29 RX ORDER — LISINOPRIL 2.5 MG/1
1 TABLET ORAL
Qty: 0 | Refills: 0 | DISCHARGE

## 2022-11-29 RX ADMIN — CLOPIDOGREL BISULFATE 75 MILLIGRAM(S): 75 TABLET, FILM COATED ORAL at 11:29

## 2022-11-29 RX ADMIN — Medication 1 MILLIGRAM(S): at 11:30

## 2022-11-29 RX ADMIN — Medication 6 UNIT(S): at 13:40

## 2022-11-29 RX ADMIN — Medication 1: at 09:02

## 2022-11-29 RX ADMIN — Medication 650 MILLIGRAM(S): at 06:29

## 2022-11-29 RX ADMIN — POLYETHYLENE GLYCOL 3350 17 GRAM(S): 17 POWDER, FOR SOLUTION ORAL at 11:30

## 2022-11-29 RX ADMIN — Medication 1: at 13:40

## 2022-11-29 RX ADMIN — Medication 20 MILLIGRAM(S): at 11:30

## 2022-11-29 RX ADMIN — AMLODIPINE BESYLATE 5 MILLIGRAM(S): 2.5 TABLET ORAL at 05:28

## 2022-11-29 RX ADMIN — Medication 500 MILLIGRAM(S): at 11:30

## 2022-11-29 RX ADMIN — Medication 6 UNIT(S): at 09:02

## 2022-11-29 RX ADMIN — Medication 25 GRAM(S): at 11:29

## 2022-11-29 RX ADMIN — Medication 100 MILLIGRAM(S): at 05:28

## 2022-11-29 RX ADMIN — GABAPENTIN 300 MILLIGRAM(S): 400 CAPSULE ORAL at 13:39

## 2022-11-29 RX ADMIN — Medication 81 MILLIGRAM(S): at 11:29

## 2022-11-29 RX ADMIN — Medication 20 MILLIEQUIVALENT(S): at 11:30

## 2022-11-29 RX ADMIN — Medication 650 MILLIGRAM(S): at 05:29

## 2022-11-29 RX ADMIN — PANTOPRAZOLE SODIUM 40 MILLIGRAM(S): 20 TABLET, DELAYED RELEASE ORAL at 05:28

## 2022-11-29 RX ADMIN — SODIUM CHLORIDE 3 MILLILITER(S): 9 INJECTION INTRAMUSCULAR; INTRAVENOUS; SUBCUTANEOUS at 13:10

## 2022-11-29 RX ADMIN — GABAPENTIN 300 MILLIGRAM(S): 400 CAPSULE ORAL at 05:27

## 2022-11-29 RX ADMIN — SODIUM CHLORIDE 3 MILLILITER(S): 9 INJECTION INTRAMUSCULAR; INTRAVENOUS; SUBCUTANEOUS at 05:37

## 2022-11-29 RX ADMIN — Medication 325 MILLIGRAM(S): at 11:30

## 2022-11-29 NOTE — PROGRESS NOTE ADULT - PROBLEM SELECTOR PLAN 4
Lovenox and SCDs for DVT prophylaxis.  Protonix for GI prophylaxis.    Plan to be discussed / reviewed with CT Surgery attending / team during AM rounds.

## 2022-11-29 NOTE — PROGRESS NOTE ADULT - PROBLEM SELECTOR PLAN 3
HA1c 11.8 on Metformin. Non-compliant with diet or meds as outpatient.   Continue fingersticks AC/HS with Lantus, premeal, and ISS for BG control.   Endocrine following.   Diabetic / consistent carb diet.

## 2022-11-29 NOTE — PROGRESS NOTE ADULT - NS ATTEND AMEND GEN_ALL_CORE FT
Plan above discussed with NP and we agreed.
Agree w plan above. Needs basal -bolus insulin. upon discharge  Might switch as outpatient to orals (Mg and SFU)
Patient was seen and examined at bedside and notes to be doing well now but notes that yesterday had episode of shortness of breath with mid sternal chest pain, pressure like and intermittent in nature, occuring approx 4 times   Patient has a hx of HTN, DM2 and smoker and thus have risk factors for CAD         Patient is S/P PCI  Patient seen and examined by me.  I have discussed my recommendation with the PA which are outlined above.  Will follow.
Patient seen and examined by me.  I have discussed my recommendation with the PA which are outlined above.  Will follow.
Patient seen in A7  CCTA- Results noted.  Spoke to patient.  Needs [facility]  D/W Dr Ramos  Start ASA/Plavix/Statin  For [facility] on 11/22/2022
Patient seen and examined by me.  Discussed with CT Surgery PA- Consider starting ACE or ARB  I have discussed my recommendation with the PA which are outlined above.  Will sign off.    MEDICATIONS  (STANDING):  amLODIPine   Tablet 5 milliGRAM(s) Oral daily  ascorbic acid 500 milliGRAM(s) Oral daily  aspirin enteric coated 81 milliGRAM(s) Oral daily  atorvastatin 80 milliGRAM(s) Oral at bedtime  chlorhexidine 2% Cloths 1 Application(s) Topical daily  clopidogrel Tablet 75 milliGRAM(s) Oral daily  dextrose 5%. 1000 milliLiter(s) (50 mL/Hr) IV Continuous <Continuous>  dextrose 5%. 1000 milliLiter(s) (100 mL/Hr) IV Continuous <Continuous>  dextrose 50% Injectable 50 milliLiter(s) IV Push every 15 minutes  dextrose 50% Injectable 25 milliLiter(s) IV Push every 15 minutes  ferrous    sulfate 325 milliGRAM(s) Oral daily  folic acid 1 milliGRAM(s) Oral daily  gabapentin 300 milliGRAM(s) Oral three times a day  glucagon  Injectable 1 milliGRAM(s) IntraMuscular once  insulin glargine Injectable (LANTUS) 18 Unit(s) SubCutaneous at bedtime  insulin lispro (ADMELOG) corrective regimen sliding scale   SubCutaneous Before meals and at bedtime  insulin lispro Injectable (ADMELOG) 3 Unit(s) SubCutaneous three times a day before meals  lidocaine   4% Patch 1 Patch Transdermal daily  metoprolol tartrate 50 milliGRAM(s) Oral two times a day  pantoprazole    Tablet 40 milliGRAM(s) Oral before breakfast  polyethylene glycol 3350 17 Gram(s) Oral daily  senna 2 Tablet(s) Oral at bedtime  sodium chloride 0.9% lock flush 3 milliLiter(s) IV Push every 8 hours

## 2022-11-29 NOTE — PROGRESS NOTE ADULT - REASON FOR ADMISSION
chest pain

## 2022-11-29 NOTE — PROGRESS NOTE ADULT - SUBJECTIVE AND OBJECTIVE BOX
Subjective: Patient seen and assessed s/p CABG. Patient asks "why does everyone keep looking at my legs" At time of exam, Pt denies chest pain, palpitations, dizziness, headache, shortness of breath, abdominal pain or N/V/D/C.    Pertinent events of the past 24 hours: Left CT removed     VITAL SIGNS  Vital Signs Last 24 Hrs  T(C): 37 (11-28-22 @ 23:59), Max: 37.2 (11-28-22 @ 16:48)  T(F): 98.6 (11-28-22 @ 23:59), Max: 98.9 (11-28-22 @ 16:48)  HR: 75 (11-28-22 @ 23:59) (63 - 75)  BP: 115/74 (11-28-22 @ 23:59) (106/65 - 120/69)  RR: 18 (11-28-22 @ 23:59) (17 - 18)  SpO2: 96% (11-28-22 @ 23:59) (96% - 100%)  on (O2)              Telemetry/Alarms:  NSR 70s    MEDICATIONS  acetaminophen     Tablet .. 650 milliGRAM(s) Oral every 6 hours PRN  amLODIPine   Tablet 5 milliGRAM(s) Oral daily  ascorbic acid 500 milliGRAM(s) Oral daily  aspirin enteric coated 81 milliGRAM(s) Oral daily  atorvastatin 80 milliGRAM(s) Oral at bedtime  clopidogrel Tablet 75 milliGRAM(s) Oral daily  enoxaparin Injectable 40 milliGRAM(s) SubCutaneous every 24 hours  ferrous    sulfate 325 milliGRAM(s) Oral daily  folic acid 1 milliGRAM(s) Oral daily  gabapentin 300 milliGRAM(s) Oral three times a day  insulin glargine Injectable (LANTUS) 20 Unit(s) SubCutaneous at bedtime  insulin lispro (ADMELOG) corrective regimen sliding scale   SubCutaneous three times a day before meals  insulin lispro (ADMELOG) corrective regimen sliding scale   SubCutaneous at bedtime  insulin lispro Injectable (ADMELOG) 6 Unit(s) SubCutaneous three times a day before meals  lidocaine   4% Patch 1 Patch Transdermal daily  metoprolol tartrate 100 milliGRAM(s) Oral every 12 hours  oxyCODONE    IR 10 milliGRAM(s) Oral every 4 hours PRN  oxyCODONE    IR 5 milliGRAM(s) Oral every 4 hours PRN  pantoprazole    Tablet 40 milliGRAM(s) Oral before breakfast  polyethylene glycol 3350 17 Gram(s) Oral daily  potassium chloride    Tablet ER 20 milliEquivalent(s) Oral daily  senna 2 Tablet(s) Oral at bedtime  simethicone 80 milliGRAM(s) Chew three times a day PRN  sodium chloride 0.9% lock flush 3 milliLiter(s) IV Push every 8 hours  torsemide 20 milliGRAM(s) Oral <User Schedule>    PHYSICAL EXAM  Constitutional: NAD  Neuro: A+O x 3, non-focal, speech clear and intact  CV: regular rate, regular rhythm, +S1S2, no murmurs or rub  Pulm/chest: lung sounds CTA and equal bilaterally, no accessory muscle use noted  Abd: +BS, soft, NT, ND  Ext: BEACH x 4, no edema  Skin: warm, well perfused  Psych: calm, appropriate affect  Incision: midline sternal incision well approximated +staples, no audible sternal click, b/l LE vein harvest site c/d/i RLE with +staples    Intake and Output  11-27 @ 07:01  -  11-28 @ 07:00  --------------------------------------------------------  IN: 1310 mL / OUT: 1110 mL / NET: 200 mL    11-28 @ 07:01  -  11-29 @ 02:25  --------------------------------------------------------  IN: 0 mL / OUT: 40 mL / NET: -40 mL    Weights:  Daily     Daily   Admit Wt: Drug Dosing Weight  Height (cm): 170.2 (19 Nov 2022 19:32)  Weight (kg): 89.8 (19 Nov 2022 19:32)  BMI (kg/m2): 31 (19 Nov 2022 19:32)  BSA (m2): 2.01 (19 Nov 2022 19:32)    All laboratory results, radiology and medications reviewed.    LABS  11-28    139  |  99  |  18.8  ----------------------------<  142<H>  3.7   |  30.0<H>  |  0.82    Ca    8.4      28 Nov 2022 04:40  Mg     2.1     11-28                                   10.4   14.73 )-----------( 300      ( 28 Nov 2022 04:40 )             31.0              CAPILLARY BLOOD GLUCOSE  POCT Blood Glucose.: 192 mg/dL (28 Nov 2022 21:35)  POCT Blood Glucose.: 166 mg/dL (28 Nov 2022 17:10)  POCT Blood Glucose.: 197 mg/dL (28 Nov 2022 12:02)  POCT Blood Glucose.: 194 mg/dL (28 Nov 2022 08:12)         < from: Xray Chest 1 View- PORTABLE-Routine (Xray Chest 1 View- PORTABLE-Routine in AM.) (11.28.22 @ 05:48) >    IMPRESSION:  No pneumothorax.    < end of copied text >    < from: TTE Echo Complete w/ Contrast w/ Doppler (11.20.22 @ 08:49) >    Summary:   1. Leftventricular ejection fraction, by visual estimation, is 60 to   65%.   2. Normal global left ventricular systolic function.   3. Normal right ventricular size and function.   4. No significant valve disease.   5. There is no evidence of pericardial effusion.    < end of copied text >      < from: 12 Lead ECG (11.25.22 @ 03:59) >    Ventricular Rate 71 BPM    Atrial Rate 71 BPM    P-R Interval 126 ms    QRS Duration 96 ms    Q-T Interval 362 ms    QTC Calculation(Bazett) 393 ms    P Axis 128 degrees    R Axis 173 degrees    T Axis 191 degrees    Diagnosis Line *** Suspect arm lead reversal, interpretation assumes no reversal  sinus rhythm  limb lead reversal    nonspecific Q's inderior leads  ST & T wave abnormality, consider lateral ischemia  Abnormal ECG    < end of copied text >

## 2022-11-29 NOTE — PROGRESS NOTE ADULT - PROBLEM SELECTOR PLAN 1
S/p emergent CABG X 3 (SVG-LAD, SVG-OM, SVG-Diag) on 11/23/22 with Dr. Hillman.   IABP removed 11/24. Continue to monitor groins.   Neurologically intact.  Hemodynamically stable.  Continue Lopressor as tolerated by HR and BP.   Continue Norvasc for HTN.   Continue aspirin and Plavix for acute graft closure prophylaxis.  Continue statin for chronic graft patency prophylaxis.  Oxygenating well, coughing and deep breathing exercises/incentive spirometry encouraged.  Monitor need for diuresis. Monitor I/Os, replenish electrolytes PRN to keep K>4 and Mg>2.   Continue with PT, increase activity as tolerated.  FS AC+HS with coverage for glycemic control; insulin teaching ongoing  Tylenol, Oxy PRN for analgesia.  Continue diuresis PRN, replete electrolytes PRN to maintain K >4, Mg >2  Continue bowel regimen PRN constipation.   Case and plan to be reviewed / discussed with CT Surgery attending / team during AM rounds.

## 2022-11-29 NOTE — ADVANCED PRACTICE NURSE CONSULT - ASSESSMENT
went to see pt in afternoon pt is a+ox3 co 0 pain. pt was educated about the importance of using insulin at this time and the effect of bg on surgical site healing. pt verbalized understanding. pt was educated about the importance of bg monitoring and was advised to check bg 3-4xdaily parameters provided. assisted pt to make healthy choices for meals and snacks to fit into his life style and ethnicity, the plate method was used to teach portions. pt was invited to [facility] diabetes club on December 11 2022 yearly calendar provided
return to see pt in am, family at bedside providing comfort, reviewed w pt the type of insulin he is going home on. written material about the use of insulin pen provided. also reviewed ss of hypoglycemia and treatment t verbalized understanding, written material provided. pt was encouraged to fu w pcp in order to improve glycemic control. [pt and family verbalized understanding.

## 2022-11-29 NOTE — PROGRESS NOTE ADULT - PROBLEM/PLAN-2
99
DISPLAY PLAN FREE TEXT

## 2022-11-29 NOTE — ADVANCED PRACTICE NURSE CONSULT - RECOMMEDATIONS
continue diabetes self management education  pt to inject all insulin doses while in the hospital using prefilled insulin syringe and rn superviiosn  insulin pen teaching  cc- [facility]s [facility] insulin  please consider increase lantus to 23 units qhs and change admelog to moderate scale.
continue diabetes self management education  insulin pen setting testing dosing and injection  pt to inject all insulin doses while in the hospital using prefilled insulin syringe and rn supervision  cc- pls set [facility]. please consider dc pt on basaglar and metformin

## 2022-11-29 NOTE — PROGRESS NOTE ADULT - PROBLEM SELECTOR PROBLEM 1
Chest pain
CAD (coronary artery disease)
CAD (coronary artery disease)
Chest pain
CAD (coronary artery disease)

## 2022-11-29 NOTE — PROGRESS NOTE ADULT - NS ATTEND OPT1 GEN_ALL_CORE
I attest my time as attending is greater than 50% of the total combined time spent on qualifying patient care activities by the PA/NP and attending.
I independently performed the documented:

## 2022-11-29 NOTE — CHART NOTE - NSCHARTNOTEFT_GEN_A_CORE
Diabetes Note: Aware consult for diabetes education; a1c 11.8%.  Pt previously taking Metformin.  Pt normally consumes 3 meals daily, but unsure about cons cho meal plan.  Pt aware of the need for insulin at this time and is agreeable to plan.  Provided diabetes self management education to patient and family members at bedside:    - Discussed a1c results and potential long term complications of poorly managed diabetes   - Discussed cons cho meal plan using the plate method with emphasis on meal timing and appropriate CHO/protein portions (with examples)   - Reviewed appropriate beverage choices  - Discussed importance of BG monitoring before meals  - Reviewed 2 types of insulin, action and timing for each  - Encouraged outpatient follow up with endocrinologist for continuous diabetes self management     Overall, pt demonstrates good understanding of information reviewed.  Pt/family members with many questions about meal plan and beverage choices; all answered.  Pt aware of elevated a1c and appears motivated to improve glycemic control.  Nutrition literature provided.  YENNY MATOSES to remain available.
Diabetes Note: Aware pt with uncontrolled diabetes; a1c 11.8%.  Pt currently sleeping; unable to arouse at this time.  Diabetes literature left at bedside.  RD CDCES to remain available for verbal instruction.
PROCEDURE NOTE  REMOVAL OF INTRA AORTIC BALLOON PUmp.  Hemodynamics remained stable.  Pulses in the right lower extremity are palpable.  The patient was placed in the supine position.  The insertion site was identified and the sutures were removed.  The IABP was turned off and the balloon deflated.  The IABP was then removed.  Direct pressure was applied for      45         minutes.  A sandbag was applied and is  to remain in place for three hours.    Monitoring of the     right        groin and both lower extremities including neuro-vascular checks performed      Complications: none
Source: Patient [x ]  Family [ ]   other [ ]    Current Diet:   Diet, Regular:   Consistent Carbohydrate {No Snacks} (CSTCHO)  DASH/TLC {Sodium & Cholesterol Restricted} (DASH) (11-24-22 @ 11:48)    Patient reports [ ] nausea  [ ] vomiting [ ] diarrhea [ ] constipation  [ ]chewing problems [ ] swallowing issues  [ ] other:     PO intake:  < 50% [ ]   50-75%  [ x]   %  [ ]  other :    Source for PO intake [ x] Patient [ ] family [x ] chart [ x] staff [ ] other    Current Weight:   (11/27)  190.9 lbs  (11/26)  193.5 lbs  (11/19)  197.9 lbs    % Weight Change: Weight trending down, noted with resolving edema    Pertinent Medications: MEDICATIONS  (STANDING):  amLODIPine   Tablet 5 milliGRAM(s) Oral daily  ascorbic acid 500 milliGRAM(s) Oral daily  aspirin enteric coated 81 milliGRAM(s) Oral daily  atorvastatin 80 milliGRAM(s) Oral at bedtime  clopidogrel Tablet 75 milliGRAM(s) Oral daily  dextrose 5%. 1000 milliLiter(s) (50 mL/Hr) IV Continuous <Continuous>  dextrose 5%. 1000 milliLiter(s) (100 mL/Hr) IV Continuous <Continuous>  dextrose 50% Injectable 50 milliLiter(s) IV Push every 15 minutes  dextrose 50% Injectable 25 milliLiter(s) IV Push every 15 minutes  enoxaparin Injectable 40 milliGRAM(s) SubCutaneous every 24 hours  ferrous    sulfate 325 milliGRAM(s) Oral daily  folic acid 1 milliGRAM(s) Oral daily  gabapentin 300 milliGRAM(s) Oral three times a day  glucagon  Injectable 1 milliGRAM(s) IntraMuscular once  insulin glargine Injectable (LANTUS) 20 Unit(s) SubCutaneous at bedtime  insulin lispro (ADMELOG) corrective regimen sliding scale   SubCutaneous three times a day before meals  insulin lispro (ADMELOG) corrective regimen sliding scale   SubCutaneous at bedtime  insulin lispro Injectable (ADMELOG) 6 Unit(s) SubCutaneous three times a day before meals  lidocaine   4% Patch 1 Patch Transdermal daily  metoprolol tartrate 100 milliGRAM(s) Oral every 12 hours  pantoprazole    Tablet 40 milliGRAM(s) Oral before breakfast  polyethylene glycol 3350 17 Gram(s) Oral daily  potassium chloride    Tablet ER 20 milliEquivalent(s) Oral daily  senna 2 Tablet(s) Oral at bedtime  sodium chloride 0.9% lock flush 3 milliLiter(s) IV Push every 8 hours  torsemide 20 milliGRAM(s) Oral <User Schedule>    MEDICATIONS  (PRN):  acetaminophen     Tablet .. 650 milliGRAM(s) Oral every 6 hours PRN Mild Pain (1 - 3)  dextrose Oral Gel 15 Gram(s) Oral once PRN Blood Glucose LESS THAN 70 milliGRAM(s)/deciliter  oxyCODONE    IR 5 milliGRAM(s) Oral every 4 hours PRN Moderate Pain (4 - 6)  oxyCODONE    IR 10 milliGRAM(s) Oral every 4 hours PRN Severe Pain (7 - 10)    Pertinent Labs: CBC Full  -  ( 27 Nov 2022 05:43 )  WBC Count : 16.20 K/uL  RBC Count : 3.29 M/uL  Hemoglobin : 9.5 g/dL  Hematocrit : 28.6 %  Platelet Count - Automated : 239 K/uL  Mean Cell Volume : 86.9 fl  Mean Cell Hemoglobin : 28.9 pg  Mean Cell Hemoglobin Concentration : 33.2 gm/dL  11-27 Na137 mmol/L Glu 191 mg/dL<H> K+ 4.0 mmol/L Cr  0.70 mg/dL BUN 17.8 mg/dL Phos n/a   Alb n/a   PAB n/a       Skin: sx inc REVH. sx inc MSI    Nutrition focused physical exam conducted - found signs of malnutrition [x ]absent [ ]present    Subcutaneous fat loss: [ ] Orbital fat pads region, [ ]Buccal fat region, [ ]Triceps region,  [ ]Ribs region    Muscle wasting: [ ]Temples region, [ ]Clavicle region, [ ]Shoulder region, [ ]Scapula region, [ ]Interosseous region,  [ ]thigh region, [ ]Calf region    Estimated Needs:   [x ] no change since previous assessment  [ ] recalculated:     Current Nutrition Diagnosis: Pt presents at nutrition risk secondary to inadequate energy intake related to increased nutrient needs s/p CABG as evidenced by meeting 50-75% of EER.   Pt s/p LHC 11/22 and CABG x3 11/23. Pt reports generally good PO intake PTA with unintentional weight loss though not able to elaborate. Pt states having an appetite but restricting intake 2/2 not being able to walk to the bathroom. Chest tubes removed and anticipates better PO intake, per Pt. Brief diet recall states mostly eating vegetables and some meat. RD provided nutrition education regarding consistent CHO diet, encouraged adequate protein, whole grains/fiber intake, pairing CHO with fat/protein to improve BG control. Written literature left at bedside with contact info, RD to remain available.     Recommendations:   1) Rx MVI daily   2) Continue Vit C, folic acid daily   3) Consider Glucerna BID if Pt remains with poor po intake   4) Encourage HBV protein sources   5) Monitor weights daily for trend/accuracy     Monitoring and Evaluation:   [ x] PO intake [x] Tolerance to diet prescription [X] Weights  [X] Follow up per protocol [X] Labs:

## 2022-11-29 NOTE — PROGRESS NOTE ADULT - SUBJECTIVE AND OBJECTIVE BOX
INTERVAL EVENTS:  Follow up diabetes management    ROS: Has incisional pain which is relived with medication Denies SOB, abd pain.    MEDICATIONS  (STANDING):  amLODIPine   Tablet 5 milliGRAM(s) Oral daily  ascorbic acid 500 milliGRAM(s) Oral daily  aspirin enteric coated 81 milliGRAM(s) Oral daily  atorvastatin 80 milliGRAM(s) Oral at bedtime  clopidogrel Tablet 75 milliGRAM(s) Oral daily  dextrose 5%. 1000 milliLiter(s) (50 mL/Hr) IV Continuous <Continuous>  dextrose 5%. 1000 milliLiter(s) (100 mL/Hr) IV Continuous <Continuous>  dextrose 50% Injectable 50 milliLiter(s) IV Push every 15 minutes  dextrose 50% Injectable 25 milliLiter(s) IV Push every 15 minutes  enoxaparin Injectable 40 milliGRAM(s) SubCutaneous every 24 hours  ferrous    sulfate 325 milliGRAM(s) Oral daily  folic acid 1 milliGRAM(s) Oral daily  gabapentin 300 milliGRAM(s) Oral three times a day  glucagon  Injectable 1 milliGRAM(s) IntraMuscular once  insulin glargine Injectable (LANTUS) 20 Unit(s) SubCutaneous at bedtime  insulin lispro (ADMELOG) corrective regimen sliding scale   SubCutaneous three times a day before meals  insulin lispro (ADMELOG) corrective regimen sliding scale   SubCutaneous at bedtime  insulin lispro Injectable (ADMELOG) 6 Unit(s) SubCutaneous three times a day before meals  lidocaine   4% Patch 1 Patch Transdermal daily  magnesium sulfate  IVPB 2 Gram(s) IV Intermittent once  metoprolol tartrate 100 milliGRAM(s) Oral every 12 hours  pantoprazole    Tablet 40 milliGRAM(s) Oral before breakfast  polyethylene glycol 3350 17 Gram(s) Oral daily  potassium chloride    Tablet ER 20 milliEquivalent(s) Oral daily  senna 2 Tablet(s) Oral at bedtime  sodium chloride 0.9% lock flush 3 milliLiter(s) IV Push every 8 hours  torsemide 20 milliGRAM(s) Oral <User Schedule>    MEDICATIONS  (PRN):  acetaminophen     Tablet .. 650 milliGRAM(s) Oral every 6 hours PRN Mild Pain (1 - 3)  dextrose Oral Gel 15 Gram(s) Oral once PRN Blood Glucose LESS THAN 70 milliGRAM(s)/deciliter  oxyCODONE    IR 10 milliGRAM(s) Oral every 4 hours PRN Severe Pain (7 - 10)  oxyCODONE    IR 5 milliGRAM(s) Oral every 4 hours PRN Moderate Pain (4 - 6)  simethicone 80 milliGRAM(s) Chew three times a day PRN Gas    Allergies  Allergy Status Unknown    Intolerances  pork (Stomach Upset)    Vital Signs Last 24 Hrs  T(C): 36.5 (29 Nov 2022 08:00), Max: 37.2 (28 Nov 2022 16:48)  T(F): 97.7 (29 Nov 2022 08:00), Max: 98.9 (28 Nov 2022 16:48)  HR: 58 (29 Nov 2022 08:00) (58 - 75)  BP: 101/70 (29 Nov 2022 08:00) (101/70 - 120/69)  BP(mean): --  RR: 18 (29 Nov 2022 08:00) (17 - 18)  SpO2: 96% (29 Nov 2022 08:00) (96% - 99%)    Parameters below as of 29 Nov 2022 05:05  Patient On (Oxygen Delivery Method): room air    PHYSICAL EXAM:  General: No apparent distress  Neck: Supple, trachea midline, no thyromegaly  Respiratory: Lungs clear bilaterally, normal rate, effort  Cardiac: +S1, S2, no m/r/g  GI: +BS, soft, non tender, non distended  Extremities: No peripheral edema, no pedal lesions  Neuro: A+O X3, no tremor  Pysch: Affect appropriate   Skin: Midline chest staples, dry/intact      LABS:                        10.2   14.43 )-----------( 341      ( 29 Nov 2022 05:41 )             30.4     11-29    139  |  102  |  18.7  ----------------------------<  151<H>  3.9   |  24.0  |  0.73    Ca    8.5      29 Nov 2022 05:41  Mg     1.9     11-29      POCT Blood Glucose.: 189 mg/dL (11-29-22 @ 08:16)  POCT Blood Glucose.: 192 mg/dL (11-28-22 @ 21:35)  POCT Blood Glucose.: 166 mg/dL (11-28-22 @ 17:10)  POCT Blood Glucose.: 197 mg/dL (11-28-22 @ 12:02)

## 2022-11-29 NOTE — PROGRESS NOTE ADULT - NS ATTEND OPT1A GEN_ALL_CORE
Exam/Medical decision making
Exam/Medical decision making
History/Exam/Medical decision making

## 2022-11-29 NOTE — PROGRESS NOTE ADULT - ASSESSMENT
46 y/o M with PMH of current smoker, HTN, T2DM, initially presented with chest pain, noted to have abnormal cardiac CT. S/p LHC with WENDY x 2 to LCx, with plan for staged PCI, but during LHC found dissection of the left main. IABP placed 11/23 for support and  underwent emergent CABG on 11/23/22.    1. Uncontrolled T2DM, a1c 11.8%  - Continue lantus 20 units qhs  - Continue admelog 6 units TID with meals  - RD/CDE consult for diabetic education  - Can discharge on lantus 20 units qhs (via [facility]) and metformin 500mg BID  - Should follow up at clinic due to uninsured     2. CAD/LM dissection  - S/p CABG  - Care per CT surgery    3. HLD  - Continue statin

## 2022-11-30 ENCOUNTER — TRANSCRIPTION ENCOUNTER (OUTPATIENT)
Age: 47
End: 2022-11-30

## 2022-12-01 ENCOUNTER — APPOINTMENT (OUTPATIENT)
Dept: CARE COORDINATION | Facility: HOME HEALTH | Age: 47
End: 2022-12-01

## 2022-12-01 VITALS
BODY MASS INDEX: 25.56 KG/M2 | HEART RATE: 74 BPM | SYSTOLIC BLOOD PRESSURE: 102 MMHG | WEIGHT: 178.57 LBS | RESPIRATION RATE: 16 BRPM | HEIGHT: 70 IN | DIASTOLIC BLOOD PRESSURE: 60 MMHG | OXYGEN SATURATION: 99 %

## 2022-12-01 DIAGNOSIS — Z95.1 PRESENCE OF AORTOCORONARY BYPASS GRAFT: ICD-10-CM

## 2022-12-01 DIAGNOSIS — E11.59 TYPE 2 DIABETES MELLITUS WITH OTHER CIRCULATORY COMPLICATIONS: ICD-10-CM

## 2022-12-01 PROCEDURE — 99024 POSTOP FOLLOW-UP VISIT: CPT

## 2022-12-01 NOTE — HISTORY OF PRESENT ILLNESS
[FreeTextEntry1] : 47 year old male every day smoker with a medical history of HTN, type 2 DM\par (HA1c 11.8 on Metformin), presented initially with complaints of intermittent\par midsternal chest pain 11/19/22, noted to have an abnormal cardiac CT, s/p LHC\par 11/22 with WENDY x 2 to LCx,  and started on plavix with plan for staged PCI\par 11/23 for pRamus 95%, but during LHC found dissection of the left main. IABP\par placed 11/23 for support and patient underwent emergent CABG X 3 (SVG-LAD,\par SVG-OM, SVG-Diag) on 11/23/22 with Dr. Hillman. PERSAUD graft was not used\par because  it was noted to be calcified. Postoperative course significant for\par volume resuscitation for hypovolemic shock, pressor requirement for hypotension\par (now weaned off and requiring Lopressor and Norvasc for HTN), and Acute blood\par loss anemia (s/p blood transfusion). Pt remained hemodynamically stable and discharged home with support of spouse/family, home care services and the [facility] team. Initial visit completed in home\par CC "I'm having some pain today. I didn't sleep well"\par

## 2022-12-01 NOTE — REASON FOR VISIT
[Follow-Up: _____] : a [unfilled] follow-up visit [Spouse] : spouse [FreeTextEntry1] : FOLLOW YOUR HEART- Transitional Care Management Program- [facility]\par \par

## 2022-12-01 NOTE — REVIEW OF SYSTEMS
[Fever] : no fever [Chills] : no chills [Feeling Poorly] : not feeling poorly [Feeling Tired] : feeling tired [Heart Rate Is Slow] : the heart rate was not slow [Heart Rate Is Fast] : the heart rate was not fast [Chest Pain] : chest pain [Palpitations] : no palpitations [Leg Claudication] : no intermittent leg claudication [Lower Ext Edema] : lower extremity edema [Negative] : Psychiatric [FreeTextEntry7] : last BM today

## 2022-12-01 NOTE — PHYSICAL EXAM
[Sclera] : the sclera and conjunctiva were normal [Neck Appearance] : the appearance of the neck was normal [Respiration, Rhythm And Depth] : normal respiratory rhythm and effort [Auscultation Breath Sounds / Voice Sounds] : lungs were clear to auscultation bilaterally [Apical Impulse] : the apical impulse was normal [Heart Rate And Rhythm] : heart rate was normal and rhythm regular [Heart Sounds] : normal S1 and S2 [Murmurs] : no murmurs [Chest Visual Inspection Thoracic Asymmetry] : no chest asymmetry [1+] : left 1+ [FreeTextEntry2] : B/L LE with trace pedal edema, B/L calves soft, NT [Bowel Sounds] : normal bowel sounds [Abdomen Soft] : soft [Abnormal Walk] : normal gait [Musculoskeletal - Swelling] : no joint swelling seen [Skin Color & Pigmentation] : normal skin color and pigmentation [Skin Turgor] : normal skin turgor [] : no rash [FreeTextEntry1] : SVG harvest site without erythema, warmth or drainage. Edges well approximated. Resolving soft, NT ecchymosis to thigh area [Oriented To Time, Place, And Person] : oriented to person, place, and time [Impaired Insight] : insight and judgment were intact [Affect] : the affect was normal

## 2022-12-01 NOTE — ASSESSMENT
[FreeTextEntry1] : Pt recovering well at home s/p OHS. Reviewed all medications and dosages with pt understanding. Pt has all medications in home and is taking as prescribed. Pain controlled with current medication regimen. Pt has not checked BG today, states yesterday's reading was "ok" . Assisted pt with using glucometer, 192 post prandial BG. Pt advised to contact [facility] team for any continued BG above 180 or less than 80. Pt with understanding. He is having reproducible sternal pain today and soreness across chest. He endorses being unable to sleep on his side, pt has not taken pain medication. He has taken tylenol but it made him "sweat" so he did not take anymore. Pt given oxycodone 5 mg now and advised to assess for symptom improvement. Advised to alternate with Tylenol 1000 mg and wean to Tylenol as rev'd. MDD rev'd as well. Pt to take oxycodone at HS to achieve comfortable sleeping position. Rev'd bowel regimen. No new symptoms, issues or concerns to report at this time.\par

## 2022-12-05 ENCOUNTER — NON-APPOINTMENT (OUTPATIENT)
Age: 47
End: 2022-12-05

## 2022-12-16 ENCOUNTER — APPOINTMENT (OUTPATIENT)
Dept: CARDIOTHORACIC SURGERY | Facility: CLINIC | Age: 47
End: 2022-12-16

## 2022-12-16 VITALS
HEIGHT: 66 IN | SYSTOLIC BLOOD PRESSURE: 136 MMHG | TEMPERATURE: 98.3 F | BODY MASS INDEX: 30.05 KG/M2 | WEIGHT: 187 LBS | DIASTOLIC BLOOD PRESSURE: 84 MMHG | RESPIRATION RATE: 15 BRPM | HEART RATE: 71 BPM | OXYGEN SATURATION: 100 %

## 2022-12-16 DIAGNOSIS — Z95.1 PRESENCE OF AORTOCORONARY BYPASS GRAFT: ICD-10-CM

## 2022-12-16 DIAGNOSIS — I25.10 ATHEROSCLEROTIC HEART DISEASE OF NATIVE CORONARY ARTERY W/OUT ANGINA PECTORIS: ICD-10-CM

## 2022-12-16 PROCEDURE — 99024 POSTOP FOLLOW-UP VISIT: CPT

## 2022-12-16 RX ORDER — OXYCODONE 5 MG/1
5 TABLET ORAL
Refills: 0 | Status: COMPLETED | COMMUNITY
End: 2022-12-16

## 2022-12-16 NOTE — PHYSICAL EXAM
[] : no respiratory distress [Exaggerated Use Of Accessory Muscles For Inspiration] : no accessory muscle use [Apical Impulse] : the apical impulse was normal [Heart Sounds] : normal S1 and S2 [Clean] : clean [Dry] : dry [Healing Well] : healing well [No Edema] : no edema

## 2022-12-16 NOTE — CONSULT LETTER
[Dear  ___] : Dear  [unfilled], [Courtesy Letter:] : I had the pleasure of seeing your patient, [unfilled], in my office today. [Please see my note below.] : Please see my note below. [Consult Closing:] : Thank you very much for allowing me to participate in the care of this patient.  If you have any questions, please do not hesitate to contact me. [Sincerely,] : Sincerely, [FreeTextEntry2] : Romario Olivarez MD [FreeTextEntry3] : Chacorta Hillman MD\par Chief of Cardiovascular and Thoracic Surgery\par System Director of Endovascular and Cardiovascular Surgery\par , Cardiovascular and Thoracic Surgery\par [facility], [facility]\par [facility]\par [facility]\par [address] [place] [address]\[place] [address]\par Tel. [phone]\par Fax [phone]

## 2022-12-16 NOTE — REASON FOR VISIT
[de-identified] : Emergent coronary artery bypass grafting x3 using reverse saphenous vein graft to the left anterior  descending, ramus intermedius and first obtuse marginal arteries, endoscopic vein harvest right thigh, open vein harvest right lower leg,  and harvest of the left internal mammary artery. [de-identified] : 11/23/22 [de-identified] : \par \par 47 year old male every day smoker with a medical history of HTN, type 2 DM (HA1c 11.8 on Metformin), presented initially with complaints of intermittent midsternal chest pain 11/19/22, noted to have an abnormal cardiac CT, s/p LHC 11/22 with WENDY x 2 to LCx,  and started on plavix with plan for staged PCI 11/23 for pRamus 95%, but during LHC found dissection of the left main. IABP placed 11/23 for support and patient underwent emergent CABG X 3 (SVG-LAD, SVG-OM, SVG-Diag) on 11/23/22 with Dr. Hillman. PERSAUD graft was not used because  it was noted to be calcified. Postoperative course was notable for significant volume resuscitation for hypovolemic shock, pressor requirement for hypotension (now weaned off and requiring Lopressor and Norvasc for HTN), and Acute blood loss anemia (s/p blood transfusion). Today, he reports feeling well and offers no complaints besides some mild incisional pain which is relieved with Tylenol. \par

## 2022-12-16 NOTE — ASSESSMENT
[FreeTextEntry1] : \par \par Mr. Larry is a 47 year old male s/p CABG with Dr. Hillman on 11/23/2022. Today on exam patient's lungs clear bilaterally, sternum stable, incision clean, dry and intact. SVG site is clean, dry and intact.  No peripheral edema noted. Instructed patient on importance of optimal glycemic control, daily showering, daily weights, incentive spirometer use, and increase ambulation as tolerated. Instructed to call office with any signs or symptoms of infection or weight gain of 2 or more pounds in 1 day or 3 or more pounds in 1 week. \par \par \par \par Plan:\par 1) Continue current medication regimen\par 2) May return on as needed basis\par 3) Follow up with cardiology clinic Dr. Zelaya (patient given clinic information to make follow up appt)\par 4) follow up with endocrinologist Dr. Nagi Andres next week \par \par \par \par \par "I, Anisha Cabrera, am scribing for and the presence of Dr. Hillman the following sections HISTORY OF PRESENT ILLNESS, PAST MEDICAL/FAMILY/SOCIAL HISTORY; REVIEW OF SYSTEMS; VITAL SIGNS; PHYSICAL EXAM; DISPOSITION."\par \par "I personally performed the services described in the documentation, review the documentation recorded by the scribe in my presence and it accurately and completely records my words and actions."\par \par

## 2022-12-19 ENCOUNTER — APPOINTMENT (OUTPATIENT)
Dept: CARDIOLOGY | Facility: CLINIC | Age: 47
End: 2022-12-19

## 2022-12-25 NOTE — CONSULT NOTE ADULT - PROVIDER SPECIALTY LIST ADULT
Intervent Cardiology
<-- Click to add NO pertinent Past Medical History
Endocrinology
Cardiology
CT Surgery

## 2022-12-29 ENCOUNTER — APPOINTMENT (OUTPATIENT)
Dept: CARDIOLOGY | Facility: CLINIC | Age: 47
End: 2022-12-29

## 2022-12-29 ENCOUNTER — TRANSCRIPTION ENCOUNTER (OUTPATIENT)
Age: 47
End: 2022-12-29

## 2023-01-09 ENCOUNTER — APPOINTMENT (OUTPATIENT)
Dept: CARDIOLOGY | Facility: CLINIC | Age: 48
End: 2023-01-09
Payer: SELF-PAY

## 2023-01-09 VITALS
DIASTOLIC BLOOD PRESSURE: 74 MMHG | SYSTOLIC BLOOD PRESSURE: 124 MMHG | BODY MASS INDEX: 31.82 KG/M2 | WEIGHT: 198 LBS | OXYGEN SATURATION: 98 % | HEART RATE: 62 BPM | TEMPERATURE: 98.1 F | HEIGHT: 66 IN

## 2023-01-09 VITALS — DIASTOLIC BLOOD PRESSURE: 66 MMHG | SYSTOLIC BLOOD PRESSURE: 118 MMHG

## 2023-01-09 DIAGNOSIS — Z87.891 PERSONAL HISTORY OF NICOTINE DEPENDENCE: ICD-10-CM

## 2023-01-09 DIAGNOSIS — Z83.3 FAMILY HISTORY OF DIABETES MELLITUS: ICD-10-CM

## 2023-01-09 DIAGNOSIS — I10 ESSENTIAL (PRIMARY) HYPERTENSION: ICD-10-CM

## 2023-01-09 DIAGNOSIS — Z82.49 FAMILY HISTORY OF ISCHEMIC HEART DISEASE AND OTHER DISEASES OF THE CIRCULATORY SYSTEM: ICD-10-CM

## 2023-01-09 DIAGNOSIS — Z78.9 OTHER SPECIFIED HEALTH STATUS: ICD-10-CM

## 2023-01-09 DIAGNOSIS — E78.5 HYPERLIPIDEMIA, UNSPECIFIED: ICD-10-CM

## 2023-01-09 PROCEDURE — 93000 ELECTROCARDIOGRAM COMPLETE: CPT

## 2023-01-09 PROCEDURE — 99215 OFFICE O/P EST HI 40 MIN: CPT

## 2023-01-09 RX ORDER — METOPROLOL TARTRATE 100 MG/1
100 TABLET, FILM COATED ORAL
Refills: 3 | Status: ACTIVE | COMMUNITY

## 2023-01-09 RX ORDER — SENNOSIDES 8.6 MG
8.6 TABLET ORAL
Refills: 0 | Status: DISCONTINUED | COMMUNITY
End: 2023-01-09

## 2023-01-09 RX ORDER — CLOPIDOGREL 75 MG/1
75 TABLET, FILM COATED ORAL DAILY
Refills: 0 | Status: DISCONTINUED | COMMUNITY
End: 2023-01-09

## 2023-01-09 RX ORDER — ATORVASTATIN CALCIUM 80 MG/1
80 TABLET, FILM COATED ORAL DAILY
Refills: 0 | Status: DISCONTINUED | COMMUNITY
End: 2023-01-09

## 2023-01-09 RX ORDER — ATORVASTATIN CALCIUM 80 MG/1
80 TABLET, FILM COATED ORAL
Refills: 0 | Status: DISCONTINUED | COMMUNITY
End: 2023-01-09

## 2023-01-09 RX ORDER — INSULIN GLARGINE 100 [IU]/ML
100 INJECTION, SOLUTION SUBCUTANEOUS
Refills: 0 | Status: ACTIVE | COMMUNITY

## 2023-01-09 RX ORDER — METFORMIN HYDROCHLORIDE 500 MG/1
500 TABLET, COATED ORAL
Refills: 0 | Status: ACTIVE | COMMUNITY

## 2023-01-09 RX ORDER — ASPIRIN ENTERIC COATED TABLETS 81 MG 81 MG/1
81 TABLET, DELAYED RELEASE ORAL DAILY
Qty: 90 | Refills: 3 | Status: ACTIVE | COMMUNITY

## 2023-01-09 RX ORDER — AMLODIPINE BESYLATE 5 MG/1
5 TABLET ORAL DAILY
Qty: 90 | Refills: 3 | Status: ACTIVE | COMMUNITY

## 2023-01-17 ENCOUNTER — NON-APPOINTMENT (OUTPATIENT)
Age: 48
End: 2023-01-17

## 2023-01-17 NOTE — CARDIOLOGY SUMMARY
[de-identified] : 1/9/2023:\par  [de-identified] : 11/22: LVEF 60-65%\par No significant valvular dysfunction

## 2023-01-17 NOTE — HISTORY OF PRESENT ILLNESS
[FreeTextEntry1] : This is a pleasant 47 year old man with a history of tobacco abuse, DMII, CAD s/p NSTEMI in Novemberof 2022, found to have LCx and LAD disease, s/p WENDY to LCx complicated by dissection of the LMCA which then required CABG by Dr. Hillman with SVG to LAD, SVG to OM and SVG to Diagonal. LIMA remained unused at that time due to significant calcification. \par \par He was discharged and presents for follow up today. Feels well and has no complaints. Denies chest pains. Scar is healing well from sternotomy and he has started light driving again. Quit smoking and has no intent of returning to cigarettes. Taking all of his medications as prescribed. \par \par Started on metformin and insulin for DMII and staying away from concerning foods.\par \par Otherwise, denies orthopnea, paroxysmal nocturnal dyspnea, lower extremity edema, unexplained weight gain or dyspnea on exertion.\par

## 2023-01-17 NOTE — DISCUSSION/SUMMARY
[FreeTextEntry1] : 46 yo man with HTN, HLD, CAD s/p NSTEMI 11/2022 with WENDY to LCx c/b dissection of LMCA now s/p CABG x 3 with SVG to OM, D1 and LAD due to LIMA calcification presenting for follow up and feeling well. \par \par #CAD s/p CABG: continue lipid lowering agents. \par - Continue statin\par - Continue aspirin\par - Patient cannot pay for cardiac rehab: encouraged to start exercising with low intensity cardio\par - Strict management of DMII\par - Continue metoprolol\par \par #HTN: well controlled\par - continue metoprolol\par - continue amlodipine\par #DMII: better control on insulin and metformin. F/U with PCP\par \par #FU In 6 months [EKG obtained to assist in diagnosis and management of assessed problem(s)] : EKG obtained to assist in diagnosis and management of assessed problem(s)

## 2023-04-10 ENCOUNTER — APPOINTMENT (OUTPATIENT)
Dept: CARDIOLOGY | Facility: CLINIC | Age: 48
End: 2023-04-10

## 2024-01-11 NOTE — ED CDU PROVIDER INITIAL DAY NOTE - NEUROLOGICAL, MLM
Call from patient who notes that starting Monday he noticed some increased swelling in BOTH legs from the knees down. Since then it has progressively gotten worse and is now painful. Patient notes he was seen 12/29 and given an injection and both pain and swelling had cleared up at that time. Ever since increasing dose of amlodipine to the 10 mg he feels like his symptoms have started. Denies CP, dizziness or lightheadedness. No redness or war,th to the area. Has been icing and trying to keep elevated with little to no relief    Patient wondering if he should go back on 5 mg dose at this time to help symptoms or what PCP would recommend. Please advise                Alert and oriented, no focal deficits, no motor or sensory deficits.

## 2024-02-12 NOTE — ED ADULT TRIAGE NOTE - LOCATION:
Plan: Assumed androgenic alopecia. Very brief discussion re: possible treatment options. Recommend patient follow up [facility] for hair loss/ possible oral minoxidil start.
Detail Level: Zone
Left arm;

## 2025-08-12 ENCOUNTER — EMERGENCY (EMERGENCY)
Facility: HOSPITAL | Age: 50
LOS: 1 days | End: 2025-08-12
Attending: EMERGENCY MEDICINE
Payer: MEDICAID

## 2025-08-12 VITALS
TEMPERATURE: 98 F | WEIGHT: 190.04 LBS | HEIGHT: 66 IN | OXYGEN SATURATION: 96 % | HEART RATE: 67 BPM | SYSTOLIC BLOOD PRESSURE: 180 MMHG | DIASTOLIC BLOOD PRESSURE: 107 MMHG | RESPIRATION RATE: 18 BRPM

## 2025-08-12 VITALS
RESPIRATION RATE: 20 BRPM | DIASTOLIC BLOOD PRESSURE: 112 MMHG | OXYGEN SATURATION: 99 % | HEART RATE: 63 BPM | SYSTOLIC BLOOD PRESSURE: 179 MMHG

## 2025-08-12 LAB
ALBUMIN SERPL ELPH-MCNC: 3.9 G/DL — SIGNIFICANT CHANGE UP (ref 3.3–5.2)
ALP SERPL-CCNC: 132 U/L — HIGH (ref 40–120)
ALT FLD-CCNC: 23 U/L — SIGNIFICANT CHANGE UP
ANION GAP SERPL CALC-SCNC: 14 MMOL/L — SIGNIFICANT CHANGE UP (ref 5–17)
AST SERPL-CCNC: 23 U/L — SIGNIFICANT CHANGE UP
BASOPHILS # BLD AUTO: 0.05 K/UL — SIGNIFICANT CHANGE UP (ref 0–0.2)
BASOPHILS NFR BLD AUTO: 0.5 % — SIGNIFICANT CHANGE UP (ref 0–2)
BILIRUB SERPL-MCNC: 0.7 MG/DL — SIGNIFICANT CHANGE UP (ref 0.4–2)
BUN SERPL-MCNC: 12.9 MG/DL — SIGNIFICANT CHANGE UP (ref 8–20)
CALCIUM SERPL-MCNC: 8.8 MG/DL — SIGNIFICANT CHANGE UP (ref 8.4–10.5)
CHLORIDE SERPL-SCNC: 104 MMOL/L — SIGNIFICANT CHANGE UP (ref 96–108)
CO2 SERPL-SCNC: 23 MMOL/L — SIGNIFICANT CHANGE UP (ref 22–29)
CREAT SERPL-MCNC: 0.75 MG/DL — SIGNIFICANT CHANGE UP (ref 0.5–1.3)
EGFR: 111 ML/MIN/1.73M2 — SIGNIFICANT CHANGE UP
EGFR: 111 ML/MIN/1.73M2 — SIGNIFICANT CHANGE UP
EOSINOPHIL # BLD AUTO: 0.07 K/UL — SIGNIFICANT CHANGE UP (ref 0–0.5)
EOSINOPHIL NFR BLD AUTO: 0.7 % — SIGNIFICANT CHANGE UP (ref 0–6)
GLUCOSE SERPL-MCNC: 117 MG/DL — HIGH (ref 70–99)
HCT VFR BLD CALC: 43.1 % — SIGNIFICANT CHANGE UP (ref 39–50)
HGB BLD-MCNC: 14.1 G/DL — SIGNIFICANT CHANGE UP (ref 13–17)
IMM GRANULOCYTES # BLD AUTO: 0.05 K/UL — SIGNIFICANT CHANGE UP (ref 0–0.07)
IMM GRANULOCYTES NFR BLD AUTO: 0.5 % — SIGNIFICANT CHANGE UP (ref 0–0.9)
LIDOCAIN IGE QN: 70 U/L — HIGH (ref 22–51)
LYMPHOCYTES # BLD AUTO: 2.98 K/UL — SIGNIFICANT CHANGE UP (ref 1–3.3)
LYMPHOCYTES NFR BLD AUTO: 28.5 % — SIGNIFICANT CHANGE UP (ref 13–44)
MCHC RBC-ENTMCNC: 27 PG — SIGNIFICANT CHANGE UP (ref 27–34)
MCHC RBC-ENTMCNC: 32.7 G/DL — SIGNIFICANT CHANGE UP (ref 32–36)
MCV RBC AUTO: 82.6 FL — SIGNIFICANT CHANGE UP (ref 80–100)
MONOCYTES # BLD AUTO: 0.78 K/UL — SIGNIFICANT CHANGE UP (ref 0–0.9)
MONOCYTES NFR BLD AUTO: 7.4 % — SIGNIFICANT CHANGE UP (ref 2–14)
NEUTROPHILS # BLD AUTO: 6.54 K/UL — SIGNIFICANT CHANGE UP (ref 1.8–7.4)
NEUTROPHILS NFR BLD AUTO: 62.4 % — SIGNIFICANT CHANGE UP (ref 43–77)
NRBC # BLD AUTO: 0 K/UL — SIGNIFICANT CHANGE UP (ref 0–0)
NRBC # FLD: 0 K/UL — SIGNIFICANT CHANGE UP (ref 0–0)
NRBC BLD AUTO-RTO: 0 /100 WBCS — SIGNIFICANT CHANGE UP (ref 0–0)
PLATELET # BLD AUTO: 215 K/UL — SIGNIFICANT CHANGE UP (ref 150–400)
PMV BLD: 11.8 FL — SIGNIFICANT CHANGE UP (ref 7–13)
POTASSIUM SERPL-MCNC: 4.1 MMOL/L — SIGNIFICANT CHANGE UP (ref 3.5–5.3)
POTASSIUM SERPL-SCNC: 4.1 MMOL/L — SIGNIFICANT CHANGE UP (ref 3.5–5.3)
PROT SERPL-MCNC: 7.6 G/DL — SIGNIFICANT CHANGE UP (ref 6.6–8.7)
RBC # BLD: 5.22 M/UL — SIGNIFICANT CHANGE UP (ref 4.2–5.8)
RBC # FLD: 12.8 % — SIGNIFICANT CHANGE UP (ref 10.3–14.5)
SODIUM SERPL-SCNC: 141 MMOL/L — SIGNIFICANT CHANGE UP (ref 135–145)
TROPONIN T, HIGH SENSITIVITY RESULT: 19 NG/L — SIGNIFICANT CHANGE UP (ref 0–51)
TROPONIN T, HIGH SENSITIVITY RESULT: 20 NG/L — SIGNIFICANT CHANGE UP (ref 0–51)
WBC # BLD: 10.47 K/UL — SIGNIFICANT CHANGE UP (ref 3.8–10.5)
WBC # FLD AUTO: 10.47 K/UL — SIGNIFICANT CHANGE UP (ref 3.8–10.5)

## 2025-08-12 PROCEDURE — 96374 THER/PROPH/DIAG INJ IV PUSH: CPT

## 2025-08-12 PROCEDURE — 85025 COMPLETE CBC W/AUTO DIFF WBC: CPT

## 2025-08-12 PROCEDURE — 96375 TX/PRO/DX INJ NEW DRUG ADDON: CPT

## 2025-08-12 PROCEDURE — 93005 ELECTROCARDIOGRAM TRACING: CPT

## 2025-08-12 PROCEDURE — 36415 COLL VENOUS BLD VENIPUNCTURE: CPT

## 2025-08-12 PROCEDURE — 83690 ASSAY OF LIPASE: CPT

## 2025-08-12 PROCEDURE — 80053 COMPREHEN METABOLIC PANEL: CPT

## 2025-08-12 PROCEDURE — 71045 X-RAY EXAM CHEST 1 VIEW: CPT

## 2025-08-12 PROCEDURE — 93010 ELECTROCARDIOGRAM REPORT: CPT

## 2025-08-12 PROCEDURE — 99285 EMERGENCY DEPT VISIT HI MDM: CPT | Mod: 25

## 2025-08-12 PROCEDURE — 99285 EMERGENCY DEPT VISIT HI MDM: CPT

## 2025-08-12 PROCEDURE — 84484 ASSAY OF TROPONIN QUANT: CPT

## 2025-08-12 PROCEDURE — 71045 X-RAY EXAM CHEST 1 VIEW: CPT | Mod: 26

## 2025-08-12 RX ORDER — ONDANSETRON HCL/PF 4 MG/2 ML
4 VIAL (ML) INJECTION ONCE
Refills: 0 | Status: COMPLETED | OUTPATIENT
Start: 2025-08-12 | End: 2025-08-12

## 2025-08-12 RX ORDER — METOPROLOL SUCCINATE 50 MG/1
25 TABLET, EXTENDED RELEASE ORAL ONCE
Refills: 0 | Status: COMPLETED | OUTPATIENT
Start: 2025-08-12 | End: 2025-08-12

## 2025-08-12 RX ORDER — LISINOPRIL 5 MG/1
10 TABLET ORAL ONCE
Refills: 0 | Status: COMPLETED | OUTPATIENT
Start: 2025-08-12 | End: 2025-08-12

## 2025-08-12 RX ORDER — ASPIRIN 325 MG
324 TABLET ORAL ONCE
Refills: 0 | Status: COMPLETED | OUTPATIENT
Start: 2025-08-12 | End: 2025-08-12

## 2025-08-12 RX ADMIN — METOPROLOL SUCCINATE 25 MILLIGRAM(S): 50 TABLET, EXTENDED RELEASE ORAL at 15:33

## 2025-08-12 RX ADMIN — Medication 4 MILLIGRAM(S): at 15:04

## 2025-08-12 RX ADMIN — Medication 324 MILLIGRAM(S): at 14:52

## 2025-08-12 RX ADMIN — LISINOPRIL 10 MILLIGRAM(S): 5 TABLET ORAL at 15:33

## 2025-08-12 RX ADMIN — Medication 20 MILLIGRAM(S): at 15:04

## (undated) DEVICE — SUT DOUBLE 6 WIRE STERNAL

## (undated) DEVICE — SOL INJ NS 0.9% 1000ML

## (undated) DEVICE — TUBING IV SET SECOND 34" W/O LOK-BLUNT

## (undated) DEVICE — SUT PROLENE 5-0 36" RB-1

## (undated) DEVICE — SYNOVIS VASCULAR PROBE 1.5MM 15CM

## (undated) DEVICE — SUT PROLENE 5-0 36" C-1

## (undated) DEVICE — SUT VICRYL 1 36" CTX UNDYED

## (undated) DEVICE — SUT PROLENE 8-0 24" BV175-6

## (undated) DEVICE — WOUND IRR IRRISEPT W 0.5 CHG

## (undated) DEVICE — SUCTION YANKAUER NO CONTROL VENT

## (undated) DEVICE — SAW BLADE STRYKER SAGITTAL SAFEDGE 40.5X47.5X0.64

## (undated) DEVICE — DRSG ALLEVYN LIFE SACRUM 6.75 X 6.75 (PINK)

## (undated) DEVICE — MEDTRONIC CLEARVIEW BLOWER MISTER KIT W TUBING SET

## (undated) DEVICE — PREP SCRUB BRUSH W CHG 4%

## (undated) DEVICE — SOL ANTI FOG

## (undated) DEVICE — SUT VICRYL 2-0 27" CT-1 UNDYED

## (undated) DEVICE — MULTIPLE PERFUSION SET FEMALE 1 INLET LEG W 4 LEGS 15" (BLUE/RED)

## (undated) DEVICE — DRSG TAPE TRANSPORE 3"

## (undated) DEVICE — DRAPE TOWEL BLUE 17" X 24"

## (undated) DEVICE — NDL TAPR FR EYE 1/2 CIR 3

## (undated) DEVICE — SUT STAINLESS STEEL 5 18" SCC

## (undated) DEVICE — SOL IRR POUR NS 0.9% 500ML

## (undated) DEVICE — SET BLOOD Y-TYPE

## (undated) DEVICE — SUT PERMAHAND SILK 2 60" TIES

## (undated) DEVICE — GLV 6.5 PROTEXIS (WHITE)

## (undated) DEVICE — SUT MONOCRYL 3-0 27" PS-2 UNDYED

## (undated) DEVICE — SUT PROLENE 4-0 36" SH

## (undated) DEVICE — DRAPE INSTRUMENT POUCH 6.75" X 11"

## (undated) DEVICE — VESSEL LOOP ASPEN MAXI RED

## (undated) DEVICE — GLV 6.5 PROTEXIS (BLUE)

## (undated) DEVICE — GETINGE VASOVIEW 7 ENDOSCOPIC VESSEL HARVESTING SYSTEM

## (undated) DEVICE — DRSG OPSITE 13.75 X 4"

## (undated) DEVICE — SUT ETHIBOND 1 30" OS6

## (undated) DEVICE — BEAVER BLADE MINI SHARP ALL ROUND (BLUE)

## (undated) DEVICE — SUT BOOT STANDARD (ASSORTED) 5 PAIR

## (undated) DEVICE — GLV 7.5 SENSICARE W ALOE

## (undated) DEVICE — SUT PROLENE 7-0 24" BV-1

## (undated) DEVICE — STEALTH CLAMP INSERT FIBRA/FIBRA 60MM

## (undated) DEVICE — PACK OPEN HEART VAMP PLUS

## (undated) DEVICE — TUBING SUCTION 20FT

## (undated) DEVICE — MAXI-FLO SUCTION CATHETER KIT 14FR STRAIGHT

## (undated) DEVICE — GLV 8.5 PROTEXIS (WHITE)

## (undated) DEVICE — SUT ETHIBOND 5 4-30" CCS

## (undated) DEVICE — TUBING MEDI-VAC W MAXIGRIP CONNECTORS 1/4"X6'

## (undated) DEVICE — PHRENIC NERVE PAD MEDIUM

## (undated) DEVICE — PACING CABLE A/V TEMP SCREW DOWN 6FT

## (undated) DEVICE — SUT SILK 5-0 60" TIES

## (undated) DEVICE — TAPE UMBILICAL 1/8 X 30" STRANDS

## (undated) DEVICE — SUT PROLENE 5-0 30" RB-2

## (undated) DEVICE — TRAY IRRIGATION SYR BULB 60CC

## (undated) DEVICE — DRSG MEPILEX 10 X 10CM (4 X 4") AG

## (undated) DEVICE — MEDICATION LABELS W MARKER

## (undated) DEVICE — DRAPE CV 106" X 135"

## (undated) DEVICE — TOURNIQUET SET TOURNIKWIK 12FR (4 TUBES, 1 SNARE) 7.5"

## (undated) DEVICE — GLV 7 PROTEXIS (WHITE)

## (undated) DEVICE — SUT ETHIBOND 2 30" V37

## (undated) DEVICE — DRAPE SLUSH / WARMER 44 X 66"

## (undated) DEVICE — SUT PROLENE 3-0 36" SH-1

## (undated) DEVICE — SUT SILK 0 30" SH

## (undated) DEVICE — CLEANER FOR ELCTR TIP

## (undated) DEVICE — SUT VICRYL 2 27" TP-1 UNDYED

## (undated) DEVICE — WARMING BLANKET DUO-THERM HYPER/HYPOTHERM ADULT

## (undated) DEVICE — BULLDOG SPRING CLIP 6MM SOFT/SOFT

## (undated) DEVICE — POSITIONER CARDIAC BUMP

## (undated) DEVICE — DRSG CURITY GAUZE SPONGE 4 X 4" 12-PLY

## (undated) DEVICE — PACK VALVE

## (undated) DEVICE — CHEST DRAIN PLEUR-EVAC DRY/WET ADULT-PEDS SINGLE (QUICK)

## (undated) DEVICE — SUT SILK 0 30" TIES

## (undated) DEVICE — SUT ETHIBOND 3-0 36" RB-1

## (undated) DEVICE — STAPLER SKIN PROXIMATE

## (undated) DEVICE — SUT PROLENE 7-0 30" BV-1

## (undated) DEVICE — MARKING PEN W RULER

## (undated) DEVICE — SUT PROLENE 7-0 24" BV175-6

## (undated) DEVICE — SUT SILK 2-0 18" SH (POP-OFF)

## (undated) DEVICE — SUT ETHIBOND 4-0 36" RB-1

## (undated) DEVICE — SUT PLEDGET 9MM X 4MM X 1.5MM

## (undated) DEVICE — SUT SILK 2 30" MH

## (undated) DEVICE — GLV 8 PROTEXIS (WHITE)

## (undated) DEVICE — SUT PROLENE 6-0 30" C-1

## (undated) DEVICE — CATH IV SAFE BC 24G X 0.75" (YELLOW)

## (undated) DEVICE — AORTIC PUNCH 4.4MM LONG HANDLE

## (undated) DEVICE — TUBING TRUWAVE PRESSURE MALE/FEMALE 72"

## (undated) DEVICE — PREP CHLORAPREP HI-LITE ORANGE 26ML

## (undated) DEVICE — NDL PERC BASEPLT 18GX7CM

## (undated) DEVICE — GOWN XL W TOWEL

## (undated) DEVICE — DRSG TEGADERM 4X4.75"

## (undated) DEVICE — Device

## (undated) DEVICE — DRAPE 3/4 SHEET 52X76"

## (undated) DEVICE — GOWN TRIMAX LG

## (undated) DEVICE — SUT BLUNT SZ 5

## (undated) DEVICE — SUT GORETEX CV-4 (3-0) 36" TH-18

## (undated) DEVICE — SUT MONOCRYL 4-0 27" PS-2 UNDYED

## (undated) DEVICE — SUT VICRYL 0 54" REEL UNDYED

## (undated) DEVICE — SYR LUER LOK 20CC

## (undated) DEVICE — STOPCOCK 3 WAY W TUBE 35"

## (undated) DEVICE — DRSG MEPILEX 10 X 30CM (4 X 12") AG

## (undated) DEVICE — TONGUE DEPRESSOR

## (undated) DEVICE — LIGATING CLIPS AESCULAP LARGE 6

## (undated) DEVICE — PRESSURE INFUSOR BAG 1000ML

## (undated) DEVICE — FOLEY TRAY 16FR 5CC LF LUBRISIL ADVANCE TEMP CLOSED

## (undated) DEVICE — SUT PDS II 2-0 27" CT-1

## (undated) DEVICE — FRAZIER SUCTION TIP 10FR

## (undated) DEVICE — NDL HYPO SAFE 18G X 1.5" (PINK)

## (undated) DEVICE — GLV 7.5 PROTEXIS (WHITE)

## (undated) DEVICE — DRAPE TOWEL WHITE 17" X 24"

## (undated) DEVICE — TUBING INSUFFLATION LAP FILTER 10FT

## (undated) DEVICE — NDL COUNTER FOAM AND MAGNET 40-70

## (undated) DEVICE — SPONGE PEANUT AUTO COUNT

## (undated) DEVICE — SUT PROLENE 4-0 36" RB-1

## (undated) DEVICE — SUT VICRYL 0 36" CT-1 UNDYED

## (undated) DEVICE — SOL IRR POUR H2O 500ML

## (undated) DEVICE — SUT VICRYL 0 36" CTX UNDYED

## (undated) DEVICE — TUBING ALARIS PUMP MODULE NON-DEHP

## (undated) DEVICE — GLV 7 PROTEXIS (BLUE)

## (undated) DEVICE — RANGER BLOOD/FLUID WARMING SET DISP

## (undated) DEVICE — SUT PROLENE 3-0 36" MH

## (undated) DEVICE — SUT ETHIBOND 2-0 36" SH

## (undated) DEVICE — VISITEC 4X4

## (undated) DEVICE — LAP PAD 18 X 18"

## (undated) DEVICE — SUCTION TUBE CARDIAC FRAZIER 6FR SHAFT 3"

## (undated) DEVICE — AORTIC PUNCH 4.0MM STANDARD HANDLE

## (undated) DEVICE — SUT SILK 0 30" KS

## (undated) DEVICE — SUT VICRYL 3-0 27" CT-1

## (undated) DEVICE — SWITCH ARISS TABLE MOUNT UNEQUAL LEGS 13"

## (undated) DEVICE — SUT ETHIBOND 2-0 30" V5 WHITE

## (undated) DEVICE — SUT HOLDER INSERT FOR OCTOBASE STERNAL RETRACTOR

## (undated) DEVICE — DRSG OPSITE 2.5 X 2"

## (undated) DEVICE — DRAIN CHANNEL 32FR ROUND HUBLESS FULL FLUTED

## (undated) DEVICE — LIGATING CLIPS AESCULAP MEDIUM BLUE 24

## (undated) DEVICE — ELCTR BOVIE BLADE 3/4" EXTENDED LENGTH 6"

## (undated) DEVICE — AORTIC PUNCH 5MM STANDARD HANDLE

## (undated) DEVICE — CONNECTOR STRAIGHT 3/8 X 1/2"

## (undated) DEVICE — SUT PROLENE 6-0 24" C-1

## (undated) DEVICE — PACING CABLE (BLUE) ATRIAL TEMP SCREW DOWN 12FT

## (undated) DEVICE — ELCTR MULTIFUNCTION DEFIBRILLATION ELECTRODE EDGE SYSTEM ADULT

## (undated) DEVICE — SUT SOFSILK 4-0 24" CV-15

## (undated) DEVICE — DRSG KLING 4"

## (undated) DEVICE — PRESSURE INFUSOR BAG 500ML

## (undated) DEVICE — NDL BLUNT 18G LOOP VESSEL MAXI WHITE

## (undated) DEVICE — SUT ETHIBOND 2-0 4-30" RB-1 WHITE

## (undated) DEVICE — STOPCOCK 3 WAY W SWIVEL MALE LUER LOCK

## (undated) DEVICE — TEMP PROBE 30MM MYOCARDIAL

## (undated) DEVICE — SUT ETHIBOND 2-0 30" SH-1 GREEN/WHITE

## (undated) DEVICE — SUT ETHIBOND 2-0 4-30" RB-1 GREEN

## (undated) DEVICE — DRAPE IOBAN 33" X 23"

## (undated) DEVICE — BLADE SURGICAL #15 CARBON

## (undated) DEVICE — SUT PROLENE 3-0 36" SH

## (undated) DEVICE — GLV 6 PROTEXIS (WHITE)

## (undated) DEVICE — SOL INJ NS 0.9% 500ML 1-PORT

## (undated) DEVICE — SENSOR MYOCARDIAL TEMP 15MM

## (undated) DEVICE — ELCTR BOVIE PENCIL HANDPIECE ROCKER SWITCH 15FT

## (undated) DEVICE — SUT PROLENE 4-0 30" SH-1